# Patient Record
Sex: FEMALE | Race: BLACK OR AFRICAN AMERICAN | NOT HISPANIC OR LATINO
[De-identification: names, ages, dates, MRNs, and addresses within clinical notes are randomized per-mention and may not be internally consistent; named-entity substitution may affect disease eponyms.]

---

## 2017-01-04 DIAGNOSIS — Z84.81 FAMILY HISTORY OF CARRIER OF GENETIC DISEASE: ICD-10-CM

## 2017-01-26 ENCOUNTER — APPOINTMENT (OUTPATIENT)
Dept: OBGYN | Facility: CLINIC | Age: 37
End: 2017-01-26

## 2017-02-17 ENCOUNTER — APPOINTMENT (OUTPATIENT)
Dept: OBGYN | Facility: CLINIC | Age: 37
End: 2017-02-17

## 2017-02-17 ENCOUNTER — RESULT CHARGE (OUTPATIENT)
Age: 37
End: 2017-02-17

## 2017-02-17 VITALS
HEIGHT: 63 IN | SYSTOLIC BLOOD PRESSURE: 128 MMHG | HEART RATE: 88 BPM | DIASTOLIC BLOOD PRESSURE: 77 MMHG | WEIGHT: 157 LBS | BODY MASS INDEX: 27.82 KG/M2

## 2017-02-17 DIAGNOSIS — Z30.430 ENCOUNTER FOR INSERTION OF INTRAUTERINE CONTRACEPTIVE DEVICE: ICD-10-CM

## 2017-02-17 RX ORDER — IBUPROFEN 600 MG/1
600 TABLET, FILM COATED ORAL
Qty: 0 | Refills: 0 | Status: COMPLETED | OUTPATIENT
Start: 2017-02-17

## 2017-02-17 RX ADMIN — IBUPROFEN 1 MG: 600 TABLET ORAL at 00:00

## 2017-02-21 LAB
HCG UR QL: NEGATIVE
QUALITY CONTROL: YES

## 2017-04-04 ENCOUNTER — RX RENEWAL (OUTPATIENT)
Age: 37
End: 2017-04-04

## 2017-04-24 ENCOUNTER — APPOINTMENT (OUTPATIENT)
Dept: OBGYN | Facility: CLINIC | Age: 37
End: 2017-04-24

## 2017-04-26 ENCOUNTER — OTHER (OUTPATIENT)
Age: 37
End: 2017-04-26

## 2017-05-25 ENCOUNTER — RX RENEWAL (OUTPATIENT)
Age: 37
End: 2017-05-25

## 2018-02-10 ENCOUNTER — RX RENEWAL (OUTPATIENT)
Age: 38
End: 2018-02-10

## 2018-02-16 ENCOUNTER — APPOINTMENT (OUTPATIENT)
Dept: OBGYN | Facility: CLINIC | Age: 38
End: 2018-02-16
Payer: COMMERCIAL

## 2018-02-16 VITALS
SYSTOLIC BLOOD PRESSURE: 135 MMHG | BODY MASS INDEX: 28.27 KG/M2 | WEIGHT: 159.56 LBS | HEIGHT: 63 IN | DIASTOLIC BLOOD PRESSURE: 77 MMHG | HEART RATE: 90 BPM

## 2018-02-16 PROCEDURE — 99395 PREV VISIT EST AGE 18-39: CPT

## 2018-02-21 ENCOUNTER — APPOINTMENT (OUTPATIENT)
Dept: OBGYN | Facility: CLINIC | Age: 38
End: 2018-02-21

## 2018-02-27 LAB
C TRACH RRNA SPEC QL NAA+PROBE: NOT DETECTED
HBV SURFACE AG SER QL: NONREACTIVE
HCV AB SER QL: NONREACTIVE
HCV S/CO RATIO: 0.18 S/CO
HIV1+2 AB SPEC QL IA.RAPID: NONREACTIVE
N GONORRHOEA RRNA SPEC QL NAA+PROBE: NOT DETECTED
SOURCE AMPLIFICATION: NORMAL
T PALLIDUM AB SER QL IA: NEGATIVE

## 2018-05-09 ENCOUNTER — RX RENEWAL (OUTPATIENT)
Age: 38
End: 2018-05-09

## 2018-10-26 ENCOUNTER — LABORATORY RESULT (OUTPATIENT)
Age: 38
End: 2018-10-26

## 2018-10-26 ENCOUNTER — APPOINTMENT (OUTPATIENT)
Dept: OBGYN | Facility: CLINIC | Age: 38
End: 2018-10-26
Payer: COMMERCIAL

## 2018-10-26 VITALS
HEIGHT: 63 IN | WEIGHT: 165 LBS | DIASTOLIC BLOOD PRESSURE: 83 MMHG | BODY MASS INDEX: 29.23 KG/M2 | HEART RATE: 86 BPM | SYSTOLIC BLOOD PRESSURE: 124 MMHG

## 2018-10-26 DIAGNOSIS — B96.89 ACUTE VAGINITIS: ICD-10-CM

## 2018-10-26 DIAGNOSIS — Z01.419 ENCOUNTER FOR GYNECOLOGICAL EXAMINATION (GENERAL) (ROUTINE) W/OUT ABNORMAL FINDINGS: ICD-10-CM

## 2018-10-26 DIAGNOSIS — N76.0 ACUTE VAGINITIS: ICD-10-CM

## 2018-10-26 PROCEDURE — 99213 OFFICE O/P EST LOW 20 MIN: CPT

## 2018-10-27 ENCOUNTER — TRANSCRIPTION ENCOUNTER (OUTPATIENT)
Age: 38
End: 2018-10-27

## 2018-10-31 LAB
CANDIDA VAG CYTO: NOT DETECTED
G VAGINALIS+PREV SP MTYP VAG QL MICRO: DETECTED
HBV SURFACE AG SER QL: NONREACTIVE
HCV AB SER QL: NONREACTIVE
HCV S/CO RATIO: 0.17 S/CO
HIV1+2 AB SPEC QL IA.RAPID: NONREACTIVE
T PALLIDUM AB SER QL IA: NEGATIVE
T VAGINALIS VAG QL WET PREP: NOT DETECTED

## 2018-12-19 ENCOUNTER — EMERGENCY (EMERGENCY)
Facility: HOSPITAL | Age: 38
LOS: 1 days | Discharge: HOME | End: 2018-12-19
Attending: EMERGENCY MEDICINE | Admitting: EMERGENCY MEDICINE
Payer: COMMERCIAL

## 2018-12-19 VITALS
DIASTOLIC BLOOD PRESSURE: 66 MMHG | SYSTOLIC BLOOD PRESSURE: 118 MMHG | OXYGEN SATURATION: 100 % | RESPIRATION RATE: 16 BRPM | TEMPERATURE: 98 F | HEART RATE: 102 BPM

## 2018-12-19 DIAGNOSIS — Z87.59 PERSONAL HISTORY OF OTHER COMPLICATIONS OF PREGNANCY, CHILDBIRTH AND THE PUERPERIUM: Chronic | ICD-10-CM

## 2018-12-19 PROCEDURE — 99283 EMERGENCY DEPT VISIT LOW MDM: CPT

## 2018-12-19 RX ORDER — ACETAMINOPHEN 500 MG
975 TABLET ORAL ONCE
Qty: 0 | Refills: 0 | Status: COMPLETED | OUTPATIENT
Start: 2018-12-19 | End: 2018-12-19

## 2018-12-19 RX ORDER — DIAZEPAM 5 MG
1 TABLET ORAL
Qty: 9 | Refills: 0
Start: 2018-12-19 | End: 2018-12-21

## 2018-12-19 RX ORDER — IBUPROFEN 200 MG
600 TABLET ORAL ONCE
Qty: 0 | Refills: 0 | Status: COMPLETED | OUTPATIENT
Start: 2018-12-19 | End: 2018-12-19

## 2018-12-19 RX ADMIN — Medication 975 MILLIGRAM(S): at 13:57

## 2018-12-19 RX ADMIN — Medication 600 MILLIGRAM(S): at 13:57

## 2018-12-19 NOTE — ED PROVIDER NOTE - FAMILY HISTORY
Mother  Still living? Unknown  Family history of hereditary spherocytosis, Age at diagnosis: Age Unknown     Father  Still living? Unknown  Family history of hypertension, Age at diagnosis: Age Unknown
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
## 2018-12-19 NOTE — ED PROVIDER NOTE - NSFOLLOWUPINSTRUCTIONS_ED_ALL_ED_FT
1. take motrin 600 mg every 6 hours for pain  2. take tylenol 975mg every 6 hours for pain  3. use cold packs for 20 minutes at a time to the affected areas for the next 3 days  4. switch to hot packs after the 3 days.  5. you will feel worse over the next 2 days. symptoms should start to improve saturday  6. activity as tolerated.
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
## 2018-12-19 NOTE — ED PROVIDER NOTE - PHYSICAL EXAMINATION
Rhona Hughes M.D.:   patient awake alert seen sitting up in stretcher NAD .   LUNGS CTAB no wheeze no crackle.   CARD RRR no m/r/g.    Abdomen soft NT ND no rebound no guarding no CVA tenderness.   EXT WWP no edema no calf tenderness CV 2+DP/PT bilaterally. no midline c/t/l spine tenderness. diffuse b/l paraspinal tenderness in c/t/l distributions.  neuro A&Ox3 cn 2-12 intact gross motor and sensory intact in all extremities gait normal.    skin warm and dry no rash  HEENT: moist mucous membranes, PERRL, EOMI

## 2018-12-19 NOTE — ED PROVIDER NOTE - PMH
Hereditary spherocytosis    IUP (intrauterine pregnancy), incidental  16 weeks  Mucous polyp of cervix
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
## 2019-07-25 ENCOUNTER — INPATIENT (INPATIENT)
Facility: HOSPITAL | Age: 39
LOS: 7 days | Discharge: HOME | End: 2019-08-02
Attending: INTERNAL MEDICINE | Admitting: INTERNAL MEDICINE
Payer: COMMERCIAL

## 2019-07-25 VITALS
OXYGEN SATURATION: 100 % | SYSTOLIC BLOOD PRESSURE: 123 MMHG | HEART RATE: 130 BPM | TEMPERATURE: 100 F | DIASTOLIC BLOOD PRESSURE: 75 MMHG | RESPIRATION RATE: 18 BRPM

## 2019-07-25 DIAGNOSIS — Z87.59 PERSONAL HISTORY OF OTHER COMPLICATIONS OF PREGNANCY, CHILDBIRTH AND THE PUERPERIUM: Chronic | ICD-10-CM

## 2019-07-25 LAB
ALBUMIN SERPL ELPH-MCNC: 4.8 G/DL — SIGNIFICANT CHANGE UP (ref 3.3–5)
ALP SERPL-CCNC: 77 U/L — SIGNIFICANT CHANGE UP (ref 40–120)
ALT FLD-CCNC: 122 U/L — HIGH (ref 4–33)
ANION GAP SERPL CALC-SCNC: 14 MMO/L — SIGNIFICANT CHANGE UP (ref 7–14)
ANISOCYTOSIS BLD QL: SLIGHT — SIGNIFICANT CHANGE UP
AST SERPL-CCNC: 82 U/L — HIGH (ref 4–32)
BASE EXCESS BLDV CALC-SCNC: 3.3 MMOL/L — SIGNIFICANT CHANGE UP
BASOPHILS # BLD AUTO: 0.02 K/UL — SIGNIFICANT CHANGE UP (ref 0–0.2)
BASOPHILS NFR BLD AUTO: 0.1 % — SIGNIFICANT CHANGE UP (ref 0–2)
BILIRUB SERPL-MCNC: 39.1 MG/DL — HIGH (ref 0.2–1.2)
BLOOD GAS VENOUS - CREATININE: 0.84 MG/DL — SIGNIFICANT CHANGE UP (ref 0.5–1.3)
BLOOD GAS VENOUS - FIO2: 21 — SIGNIFICANT CHANGE UP
BUN SERPL-MCNC: 17 MG/DL — SIGNIFICANT CHANGE UP (ref 7–23)
CALCIUM SERPL-MCNC: 9.8 MG/DL — SIGNIFICANT CHANGE UP (ref 8.4–10.5)
CHLORIDE BLDV-SCNC: 105 MMOL/L — SIGNIFICANT CHANGE UP (ref 96–108)
CHLORIDE SERPL-SCNC: 108 MMOL/L — HIGH (ref 98–107)
CO2 SERPL-SCNC: 23 MMOL/L — SIGNIFICANT CHANGE UP (ref 22–31)
CREAT SERPL-MCNC: 0.6 MG/DL — SIGNIFICANT CHANGE UP (ref 0.5–1.3)
EOSINOPHIL # BLD AUTO: 0.01 K/UL — SIGNIFICANT CHANGE UP (ref 0–0.5)
EOSINOPHIL NFR BLD AUTO: 0 % — SIGNIFICANT CHANGE UP (ref 0–6)
GAS PNL BLDV: 145 MMOL/L — SIGNIFICANT CHANGE UP (ref 136–146)
GLUCOSE BLDV-MCNC: 103 MG/DL — HIGH (ref 70–99)
GLUCOSE SERPL-MCNC: 112 MG/DL — HIGH (ref 70–99)
HCO3 BLDV-SCNC: 26 MMOL/L — SIGNIFICANT CHANGE UP (ref 20–27)
HCT VFR BLD CALC: 28.6 % — LOW (ref 34.5–45)
HCT VFR BLDV CALC: 29.9 % — LOW (ref 34.5–45)
HGB BLD-MCNC: 9.4 G/DL — LOW (ref 11.5–15.5)
HGB BLDV-MCNC: 9.7 G/DL — LOW (ref 11.5–15.5)
IMM GRANULOCYTES NFR BLD AUTO: 0.7 % — SIGNIFICANT CHANGE UP (ref 0–1.5)
LACTATE BLDV-MCNC: 1.4 MMOL/L — SIGNIFICANT CHANGE UP (ref 0.5–2)
LYMPHOCYTES # BLD AUTO: 0.32 K/UL — LOW (ref 1–3.3)
LYMPHOCYTES # BLD AUTO: 1.5 % — LOW (ref 13–44)
MACROCYTES BLD QL: SLIGHT — SIGNIFICANT CHANGE UP
MANUAL SMEAR VERIFICATION: SIGNIFICANT CHANGE UP
MCHC RBC-ENTMCNC: 30 PG — SIGNIFICANT CHANGE UP (ref 27–34)
MCHC RBC-ENTMCNC: 32.9 % — SIGNIFICANT CHANGE UP (ref 32–36)
MCV RBC AUTO: 91.4 FL — SIGNIFICANT CHANGE UP (ref 80–100)
MONOCYTES # BLD AUTO: 1.53 K/UL — HIGH (ref 0–0.9)
MONOCYTES NFR BLD AUTO: 7.2 % — SIGNIFICANT CHANGE UP (ref 2–14)
NEUTROPHILS # BLD AUTO: 19.36 K/UL — HIGH (ref 1.8–7.4)
NEUTROPHILS NFR BLD AUTO: 90.5 % — HIGH (ref 43–77)
NRBC # FLD: 0.06 K/UL — SIGNIFICANT CHANGE UP (ref 0–0)
PCO2 BLDV: 47 MMHG — SIGNIFICANT CHANGE UP (ref 41–51)
PH BLDV: 7.39 PH — SIGNIFICANT CHANGE UP (ref 7.32–7.43)
PLATELET # BLD AUTO: 252 K/UL — SIGNIFICANT CHANGE UP (ref 150–400)
PLATELET COUNT - ESTIMATE: NORMAL — SIGNIFICANT CHANGE UP
PMV BLD: 10.5 FL — SIGNIFICANT CHANGE UP (ref 7–13)
PO2 BLDV: < 24 MMHG — LOW (ref 35–40)
POLYCHROMASIA BLD QL SMEAR: SLIGHT — SIGNIFICANT CHANGE UP
POTASSIUM BLDV-SCNC: 3.7 MMOL/L — SIGNIFICANT CHANGE UP (ref 3.4–4.5)
POTASSIUM SERPL-MCNC: 3.6 MMOL/L — SIGNIFICANT CHANGE UP (ref 3.5–5.3)
POTASSIUM SERPL-SCNC: 3.6 MMOL/L — SIGNIFICANT CHANGE UP (ref 3.5–5.3)
PROT SERPL-MCNC: 6.6 G/DL — SIGNIFICANT CHANGE UP (ref 6–8.3)
RBC # BLD: 3.13 M/UL — LOW (ref 3.8–5.2)
RBC # FLD: 25.1 % — HIGH (ref 10.3–14.5)
SAO2 % BLDV: 27.6 % — LOW (ref 60–85)
SODIUM SERPL-SCNC: 145 MMOL/L — SIGNIFICANT CHANGE UP (ref 135–145)
WBC # BLD: 21.39 K/UL — HIGH (ref 3.8–10.5)
WBC # FLD AUTO: 21.39 K/UL — HIGH (ref 3.8–10.5)

## 2019-07-25 PROCEDURE — 71046 X-RAY EXAM CHEST 2 VIEWS: CPT | Mod: 26

## 2019-07-25 RX ORDER — SODIUM CHLORIDE 9 MG/ML
1000 INJECTION INTRAMUSCULAR; INTRAVENOUS; SUBCUTANEOUS ONCE
Refills: 0 | Status: COMPLETED | OUTPATIENT
Start: 2019-07-25 | End: 2019-07-25

## 2019-07-25 RX ORDER — IBUPROFEN 200 MG
600 TABLET ORAL ONCE
Refills: 0 | Status: COMPLETED | OUTPATIENT
Start: 2019-07-25 | End: 2019-07-25

## 2019-07-25 RX ORDER — ACETAMINOPHEN 500 MG
975 TABLET ORAL ONCE
Refills: 0 | Status: DISCONTINUED | OUTPATIENT
Start: 2019-07-25 | End: 2019-07-25

## 2019-07-25 RX ORDER — KETOROLAC TROMETHAMINE 30 MG/ML
15 SYRINGE (ML) INJECTION ONCE
Refills: 0 | Status: DISCONTINUED | OUTPATIENT
Start: 2019-07-25 | End: 2019-07-25

## 2019-07-25 RX ORDER — ONDANSETRON 8 MG/1
4 TABLET, FILM COATED ORAL ONCE
Refills: 0 | Status: COMPLETED | OUTPATIENT
Start: 2019-07-25 | End: 2019-07-25

## 2019-07-25 RX ADMIN — ONDANSETRON 4 MILLIGRAM(S): 8 TABLET, FILM COATED ORAL at 22:16

## 2019-07-25 RX ADMIN — Medication 600 MILLIGRAM(S): at 22:15

## 2019-07-25 RX ADMIN — Medication 15 MILLIGRAM(S): at 22:30

## 2019-07-25 RX ADMIN — SODIUM CHLORIDE 1000 MILLILITER(S): 9 INJECTION INTRAMUSCULAR; INTRAVENOUS; SUBCUTANEOUS at 23:54

## 2019-07-25 RX ADMIN — SODIUM CHLORIDE 1000 MILLILITER(S): 9 INJECTION INTRAMUSCULAR; INTRAVENOUS; SUBCUTANEOUS at 22:16

## 2019-07-25 NOTE — ED PROVIDER NOTE - ATTENDING CONTRIBUTION TO CARE
37yo F with pmhx heriditary spherocytosis, no prior abdominal surgeries, has IUD, p/w 2 days generalized abd cramping, mostly lower abd, n/v, and dark reddish urine but no dysuria. Noted to have borderline fever here of 100.2F. no cough or URI sx. LMP 1 month ago about per patient    General: Patient in no apparent distress, AAO x 3  Skin: Dry and intact  HEENT: Oral mucosa moist. No pharyngeal exudates or tonsillar enlargement  Eyes: Conjunctiva normal. ++scleral icterus  Cardiac: Regular rate and rhythm  Respiratory: Lungs clear b/l and symmetric. No respiratory distress  Gastrointestinal: Abdomen soft, nondistended, nontender. + hepatomegaly  Musculoskeletal: Moves all extremities spontaneously  Neurological: alert and oriented to personal, place and time  Psychiatric: Cooperative    a/p  ?uti vs bilirubin in urine.   unclear etiology of borderline temp but abd clinically nontender   labs, ua, pain meds  serum bhcg as urine tests not working ? 2/2 blood or bili

## 2019-07-25 NOTE — ED ADULT TRIAGE NOTE - CHIEF COMPLAINT QUOTE
Pt with n/v and abdominal cramping since yesterday 10am. pt also reports  hematuria at 3pm today. Pt with fever and tachycardic  in triage. Pts with HX spherocytosis, eye are jaudice.

## 2019-07-25 NOTE — ED PROVIDER NOTE - PROGRESS NOTE DETAILS
CIANO DO- labs showin transaminitis and tbili higher than baseline. alos showing leukocytosis. pending ua. will get antibx

## 2019-07-25 NOTE — ED PROVIDER NOTE - PHYSICAL EXAMINATION
Rajan BRYSON MD PGY2:   PHYSICAL EXAM:    GENERAL: NAD, well-developed. Lying on bed.   HEENT:  Atraumatic, Normocephalic  CHEST/LUNG: Chest rise equal bilaterally. CTAB.   HEART: Regular rate and rhythm. No murmurs or rubs.   ABDOMEN: Soft, Nontender, Nondistended with knees flexed. No RUQ TTP. No suprapubic TTP.   EXTREMITIES:  2+ Peripheral Pulses.  PSYCH: A&Ox3.   SKIN: No obvious rashes or lesions

## 2019-07-25 NOTE — ED PROVIDER NOTE - CLINICAL SUMMARY MEDICAL DECISION MAKING FREE TEXT BOX
Rajan BRYSON MD PGY2: Patient here with fevers concerning for urinary source. WIll obtain UA and urine culture to evaluate. Given lack of TTP will defer imaging for intrabdominal pathology. Will obtain CMP to assess for hepatic function given chronic jaundice due to hereditary spherocytosis. Will give IVF, zofran, ibuprofen for borderline temp and PO challenge.

## 2019-07-25 NOTE — ED PROVIDER NOTE - OBJECTIVE STATEMENT
Rajan BRYSON MD PGY2: 38 F PMH hereditary spherocytosis here with 2 days of abdominal cramping, inability to tolerate PO and hematuria. No burning with urination, however notices LLQ pain with voiding. Has been having subjective fevers at home. No diarrhea. No recent sick contacts. No cough or difficulty breathing.

## 2019-07-25 NOTE — ED ADULT NURSE NOTE - OBJECTIVE STATEMENT
pt received to room 19 a/o x 3 c/o nausea and multiple episodes of NBNB vomiting, and lower abd cramping since yesterday afternoon and hematouria that started today. pt states she hasn't been able to keep anything down since yesterday. Pt has hereditary spherocytosis and noted to have jaundiced eyes. Respirations even and unlabored. Lung sounds clear with equal chest rise bilaterally. ABD is soft,  tender to lower abd, distended with normal active bowel sounds. pt denies any frequency or dysurisa. 22g iv placed to top or left hand. labs drawn and sent. medication given as per order.

## 2019-07-26 DIAGNOSIS — R74.0 NONSPECIFIC ELEVATION OF LEVELS OF TRANSAMINASE AND LACTIC ACID DEHYDROGENASE [LDH]: ICD-10-CM

## 2019-07-26 DIAGNOSIS — A41.9 SEPSIS, UNSPECIFIED ORGANISM: ICD-10-CM

## 2019-07-26 DIAGNOSIS — K85.80 OTHER ACUTE PANCREATITIS WITHOUT NECROSIS OR INFECTION: ICD-10-CM

## 2019-07-26 DIAGNOSIS — Z29.9 ENCOUNTER FOR PROPHYLACTIC MEASURES, UNSPECIFIED: ICD-10-CM

## 2019-07-26 DIAGNOSIS — K85.90 ACUTE PANCREATITIS WITHOUT NECROSIS OR INFECTION, UNSPECIFIED: ICD-10-CM

## 2019-07-26 DIAGNOSIS — E80.6 OTHER DISORDERS OF BILIRUBIN METABOLISM: ICD-10-CM

## 2019-07-26 DIAGNOSIS — D64.9 ANEMIA, UNSPECIFIED: ICD-10-CM

## 2019-07-26 LAB
ALBUMIN SERPL ELPH-MCNC: 4 G/DL — SIGNIFICANT CHANGE UP (ref 3.3–5)
ALBUMIN SERPL ELPH-MCNC: 4 G/DL — SIGNIFICANT CHANGE UP (ref 3.3–5)
ALP SERPL-CCNC: 67 U/L — SIGNIFICANT CHANGE UP (ref 40–120)
ALP SERPL-CCNC: 67 U/L — SIGNIFICANT CHANGE UP (ref 40–120)
ALT FLD-CCNC: 93 U/L — HIGH (ref 4–33)
ALT FLD-CCNC: 93 U/L — HIGH (ref 4–33)
ANION GAP SERPL CALC-SCNC: 11 MMO/L — SIGNIFICANT CHANGE UP (ref 7–14)
APPEARANCE UR: SIGNIFICANT CHANGE UP
APTT BLD: 26.9 SEC — LOW (ref 27.5–36.3)
AST SERPL-CCNC: 47 U/L — HIGH (ref 4–32)
AST SERPL-CCNC: 47 U/L — HIGH (ref 4–32)
BACTERIA # UR AUTO: HIGH
BILIRUB DIRECT SERPL-MCNC: 13.3 MG/DL — HIGH (ref 0.1–0.2)
BILIRUB DIRECT SERPL-MCNC: 13.4 MG/DL — HIGH (ref 0.1–0.2)
BILIRUB SERPL-MCNC: 34.1 MG/DL — HIGH (ref 0.2–1.2)
BILIRUB SERPL-MCNC: 34.1 MG/DL — HIGH (ref 0.2–1.2)
BILIRUB UR-MCNC: HIGH
BLD GP AB SCN SERPL QL: NEGATIVE — SIGNIFICANT CHANGE UP
BLOOD UR QL VISUAL: NEGATIVE — SIGNIFICANT CHANGE UP
BUN SERPL-MCNC: 18 MG/DL — SIGNIFICANT CHANGE UP (ref 7–23)
CALCIUM SERPL-MCNC: 8.7 MG/DL — SIGNIFICANT CHANGE UP (ref 8.4–10.5)
CHLORIDE SERPL-SCNC: 111 MMOL/L — HIGH (ref 98–107)
CO2 SERPL-SCNC: 24 MMOL/L — SIGNIFICANT CHANGE UP (ref 22–31)
COLOR SPEC: SIGNIFICANT CHANGE UP
CREAT SERPL-MCNC: 0.54 MG/DL — SIGNIFICANT CHANGE UP (ref 0.5–1.3)
EPI CELLS # UR: SIGNIFICANT CHANGE UP
FOLATE SERPL-MCNC: 13.9 NG/ML — SIGNIFICANT CHANGE UP (ref 4.7–20)
GLUCOSE SERPL-MCNC: 96 MG/DL — SIGNIFICANT CHANGE UP (ref 70–99)
GLUCOSE UR-MCNC: NEGATIVE — SIGNIFICANT CHANGE UP
HAPTOGLOB SERPL-MCNC: < 20 MG/DL — LOW (ref 34–200)
HAV IGM SER-ACNC: NONREACTIVE — SIGNIFICANT CHANGE UP
HBV CORE IGM SER-ACNC: NONREACTIVE — SIGNIFICANT CHANGE UP
HBV SURFACE AG SER-ACNC: NONREACTIVE — SIGNIFICANT CHANGE UP
HCG SERPL-ACNC: < 5 MIU/ML — SIGNIFICANT CHANGE UP
HCT VFR BLD CALC: 20.7 % — CRITICAL LOW (ref 34.5–45)
HCT VFR BLD CALC: 21.6 % — LOW (ref 34.5–45)
HCV AB S/CO SERPL IA: 0.11 S/CO — SIGNIFICANT CHANGE UP (ref 0–0.99)
HCV AB SERPL-IMP: SIGNIFICANT CHANGE UP
HGB BLD-MCNC: 6.5 G/DL — CRITICAL LOW (ref 11.5–15.5)
HGB BLD-MCNC: 6.9 G/DL — CRITICAL LOW (ref 11.5–15.5)
INR BLD: 1.45 — HIGH (ref 0.88–1.17)
KETONES UR-MCNC: SIGNIFICANT CHANGE UP
LACTATE SERPL-SCNC: 0.7 MMOL/L — SIGNIFICANT CHANGE UP (ref 0.5–2)
LDH SERPL L TO P-CCNC: 287 U/L — HIGH (ref 135–225)
LEUKOCYTE ESTERASE UR-ACNC: NEGATIVE — SIGNIFICANT CHANGE UP
LIDOCAIN IGE QN: > 600 U/L — HIGH (ref 7–60)
MAGNESIUM SERPL-MCNC: 2 MG/DL — SIGNIFICANT CHANGE UP (ref 1.6–2.6)
MCHC RBC-ENTMCNC: 29.3 PG — SIGNIFICANT CHANGE UP (ref 27–34)
MCHC RBC-ENTMCNC: 29.5 PG — SIGNIFICANT CHANGE UP (ref 27–34)
MCHC RBC-ENTMCNC: 31.4 % — LOW (ref 32–36)
MCHC RBC-ENTMCNC: 31.9 % — LOW (ref 32–36)
MCV RBC AUTO: 92.3 FL — SIGNIFICANT CHANGE UP (ref 80–100)
MCV RBC AUTO: 93.2 FL — SIGNIFICANT CHANGE UP (ref 80–100)
NITRITE UR-MCNC: NEGATIVE — SIGNIFICANT CHANGE UP
NRBC # FLD: 0.02 K/UL — SIGNIFICANT CHANGE UP (ref 0–0)
NRBC # FLD: 0.05 K/UL — SIGNIFICANT CHANGE UP (ref 0–0)
PH UR: 6 — SIGNIFICANT CHANGE UP (ref 5–8)
PHOSPHATE SERPL-MCNC: 3.2 MG/DL — SIGNIFICANT CHANGE UP (ref 2.5–4.5)
PLATELET # BLD AUTO: 159 K/UL — SIGNIFICANT CHANGE UP (ref 150–400)
PLATELET # BLD AUTO: 164 K/UL — SIGNIFICANT CHANGE UP (ref 150–400)
PMV BLD: 10.4 FL — SIGNIFICANT CHANGE UP (ref 7–13)
PMV BLD: 9.7 FL — SIGNIFICANT CHANGE UP (ref 7–13)
POTASSIUM SERPL-MCNC: 3.1 MMOL/L — LOW (ref 3.5–5.3)
POTASSIUM SERPL-SCNC: 3.1 MMOL/L — LOW (ref 3.5–5.3)
PROT SERPL-MCNC: 5.8 G/DL — LOW (ref 6–8.3)
PROT SERPL-MCNC: 5.8 G/DL — LOW (ref 6–8.3)
PROT UR-MCNC: SIGNIFICANT CHANGE UP
PROTHROM AB SERPL-ACNC: 16.3 SEC — HIGH (ref 9.8–13.1)
RBC # BLD: 2.22 M/UL — LOW (ref 3.8–5.2)
RBC # BLD: 2.34 M/UL — LOW (ref 3.8–5.2)
RBC # FLD: 25.1 % — HIGH (ref 10.3–14.5)
RBC # FLD: 26.2 % — HIGH (ref 10.3–14.5)
RBC CASTS # UR COMP ASSIST: SIGNIFICANT CHANGE UP (ref 0–?)
RETICS #: 348 K/UL — HIGH (ref 25–125)
RETICS/RBC NFR: 14.9 % — HIGH (ref 0.5–2.5)
RH IG SCN BLD-IMP: POSITIVE — SIGNIFICANT CHANGE UP
SODIUM SERPL-SCNC: 146 MMOL/L — HIGH (ref 135–145)
SP GR SPEC: 1.03 — SIGNIFICANT CHANGE UP (ref 1–1.04)
TRIGL SERPL-MCNC: 104 MG/DL — SIGNIFICANT CHANGE UP (ref 10–149)
UROBILINOGEN FLD QL: HIGH
WBC # BLD: 11.86 K/UL — HIGH (ref 3.8–10.5)
WBC # BLD: 13.71 K/UL — HIGH (ref 3.8–10.5)
WBC # FLD AUTO: 11.86 K/UL — HIGH (ref 3.8–10.5)
WBC # FLD AUTO: 13.71 K/UL — HIGH (ref 3.8–10.5)
WBC UR QL: HIGH (ref 0–?)

## 2019-07-26 PROCEDURE — 12345: CPT | Mod: NC

## 2019-07-26 PROCEDURE — 76705 ECHO EXAM OF ABDOMEN: CPT | Mod: 26

## 2019-07-26 PROCEDURE — 74182 MRI ABDOMEN W/CONTRAST: CPT | Mod: 26

## 2019-07-26 PROCEDURE — 99223 1ST HOSP IP/OBS HIGH 75: CPT | Mod: GC

## 2019-07-26 RX ORDER — CEFEPIME 1 G/1
1000 INJECTION, POWDER, FOR SOLUTION INTRAMUSCULAR; INTRAVENOUS ONCE
Refills: 0 | Status: COMPLETED | OUTPATIENT
Start: 2019-07-26 | End: 2019-07-26

## 2019-07-26 RX ORDER — HYDROMORPHONE HYDROCHLORIDE 2 MG/ML
0.5 INJECTION INTRAMUSCULAR; INTRAVENOUS; SUBCUTANEOUS EVERY 4 HOURS
Refills: 0 | Status: DISCONTINUED | OUTPATIENT
Start: 2019-07-26 | End: 2019-08-02

## 2019-07-26 RX ORDER — HYDROMORPHONE HYDROCHLORIDE 2 MG/ML
0.5 INJECTION INTRAMUSCULAR; INTRAVENOUS; SUBCUTANEOUS EVERY 6 HOURS
Refills: 0 | Status: DISCONTINUED | OUTPATIENT
Start: 2019-07-26 | End: 2019-07-26

## 2019-07-26 RX ORDER — SODIUM CHLORIDE 9 MG/ML
1000 INJECTION, SOLUTION INTRAVENOUS
Refills: 0 | Status: DISCONTINUED | OUTPATIENT
Start: 2019-07-26 | End: 2019-07-27

## 2019-07-26 RX ORDER — ONDANSETRON 8 MG/1
4 TABLET, FILM COATED ORAL ONCE
Refills: 0 | Status: COMPLETED | OUTPATIENT
Start: 2019-07-26 | End: 2019-07-26

## 2019-07-26 RX ORDER — METRONIDAZOLE 500 MG
500 TABLET ORAL EVERY 8 HOURS
Refills: 0 | Status: DISCONTINUED | OUTPATIENT
Start: 2019-07-26 | End: 2019-07-26

## 2019-07-26 RX ORDER — POTASSIUM CHLORIDE 20 MEQ
10 PACKET (EA) ORAL ONCE
Refills: 0 | Status: COMPLETED | OUTPATIENT
Start: 2019-07-26 | End: 2019-07-26

## 2019-07-26 RX ORDER — ACETAMINOPHEN 500 MG
1000 TABLET ORAL EVERY 8 HOURS
Refills: 0 | Status: COMPLETED | OUTPATIENT
Start: 2019-07-26 | End: 2019-07-26

## 2019-07-26 RX ORDER — SODIUM CHLORIDE 9 MG/ML
1000 INJECTION, SOLUTION INTRAVENOUS
Refills: 0 | Status: DISCONTINUED | OUTPATIENT
Start: 2019-07-26 | End: 2019-07-26

## 2019-07-26 RX ORDER — CEFEPIME 1 G/1
2000 INJECTION, POWDER, FOR SOLUTION INTRAMUSCULAR; INTRAVENOUS EVERY 8 HOURS
Refills: 0 | Status: DISCONTINUED | OUTPATIENT
Start: 2019-07-26 | End: 2019-07-26

## 2019-07-26 RX ORDER — KETOROLAC TROMETHAMINE 30 MG/ML
15 SYRINGE (ML) INJECTION ONCE
Refills: 0 | Status: DISCONTINUED | OUTPATIENT
Start: 2019-07-26 | End: 2019-07-26

## 2019-07-26 RX ORDER — FOLIC ACID 0.8 MG
1 TABLET ORAL DAILY
Refills: 0 | Status: DISCONTINUED | OUTPATIENT
Start: 2019-07-26 | End: 2019-08-02

## 2019-07-26 RX ORDER — POTASSIUM CHLORIDE 20 MEQ
20 PACKET (EA) ORAL ONCE
Refills: 0 | Status: DISCONTINUED | OUTPATIENT
Start: 2019-07-26 | End: 2019-07-26

## 2019-07-26 RX ORDER — PIPERACILLIN AND TAZOBACTAM 4; .5 G/20ML; G/20ML
3.38 INJECTION, POWDER, LYOPHILIZED, FOR SOLUTION INTRAVENOUS EVERY 8 HOURS
Refills: 0 | Status: COMPLETED | OUTPATIENT
Start: 2019-07-26 | End: 2019-08-02

## 2019-07-26 RX ADMIN — PIPERACILLIN AND TAZOBACTAM 25 GRAM(S): 4; .5 INJECTION, POWDER, LYOPHILIZED, FOR SOLUTION INTRAVENOUS at 22:01

## 2019-07-26 RX ADMIN — Medication 15 MILLIGRAM(S): at 01:55

## 2019-07-26 RX ADMIN — Medication 1 MILLIGRAM(S): at 12:28

## 2019-07-26 RX ADMIN — Medication 100 MILLIEQUIVALENT(S): at 15:08

## 2019-07-26 RX ADMIN — CEFEPIME 100 MILLIGRAM(S): 1 INJECTION, POWDER, FOR SOLUTION INTRAMUSCULAR; INTRAVENOUS at 01:16

## 2019-07-26 RX ADMIN — SODIUM CHLORIDE 100 MILLILITER(S): 9 INJECTION, SOLUTION INTRAVENOUS at 07:51

## 2019-07-26 RX ADMIN — Medication 100 MILLIGRAM(S): at 07:51

## 2019-07-26 RX ADMIN — HYDROMORPHONE HYDROCHLORIDE 0.5 MILLIGRAM(S): 2 INJECTION INTRAMUSCULAR; INTRAVENOUS; SUBCUTANEOUS at 14:40

## 2019-07-26 RX ADMIN — HYDROMORPHONE HYDROCHLORIDE 0.5 MILLIGRAM(S): 2 INJECTION INTRAMUSCULAR; INTRAVENOUS; SUBCUTANEOUS at 08:26

## 2019-07-26 RX ADMIN — HYDROMORPHONE HYDROCHLORIDE 0.5 MILLIGRAM(S): 2 INJECTION INTRAMUSCULAR; INTRAVENOUS; SUBCUTANEOUS at 14:25

## 2019-07-26 RX ADMIN — SODIUM CHLORIDE 100 MILLILITER(S): 9 INJECTION, SOLUTION INTRAVENOUS at 18:42

## 2019-07-26 RX ADMIN — PIPERACILLIN AND TAZOBACTAM 25 GRAM(S): 4; .5 INJECTION, POWDER, LYOPHILIZED, FOR SOLUTION INTRAVENOUS at 15:08

## 2019-07-26 RX ADMIN — ONDANSETRON 4 MILLIGRAM(S): 8 TABLET, FILM COATED ORAL at 15:16

## 2019-07-26 RX ADMIN — SODIUM CHLORIDE 100 MILLILITER(S): 9 INJECTION, SOLUTION INTRAVENOUS at 22:01

## 2019-07-26 RX ADMIN — Medication 400 MILLIGRAM(S): at 18:45

## 2019-07-26 RX ADMIN — HYDROMORPHONE HYDROCHLORIDE 0.5 MILLIGRAM(S): 2 INJECTION INTRAMUSCULAR; INTRAVENOUS; SUBCUTANEOUS at 08:11

## 2019-07-26 NOTE — PROVIDER CONTACT NOTE (CRITICAL VALUE NOTIFICATION) - BACKGROUND
Pt admitted with vlood in urine, abdominal cramping and difficulty urinating. Pt has hereditary spherocytosis

## 2019-07-26 NOTE — H&P ADULT - NSHPSOCIALHISTORY_GEN_ALL_CORE
Socially drinks , but reports low drinking tolerance. Denies smoking ( only in high school) has quit over 10 years ago. Denies illicit drug use. Smokes marijuana at times.

## 2019-07-26 NOTE — PROGRESS NOTE ADULT - SUBJECTIVE AND OBJECTIVE BOX
Patient is a 38y old  Female who presents with a chief complaint of Hyperbilirubinemia (2019 04:44)      SUBJECTIVE / OVERNIGHT EVENTS:  Patient seen and examined at bedside.    Other Review of Systems Negative.    MEDICATIONS  (STANDING):  folic acid 1 milliGRAM(s) Oral daily  lactated ringers. 1000 milliLiter(s) (100 mL/Hr) IV Continuous <Continuous>  piperacillin/tazobactam IVPB.. 3.375 Gram(s) IV Intermittent every 8 hours    MEDICATIONS  (PRN):  HYDROmorphone  Injectable 0.5 milliGRAM(s) IV Push every 6 hours PRN Severe Pain (7 - 10)      OBJECTIVE:    Vital Signs Last 24 Hrs  T(C): 36.9 (2019 14:12), Max: 37.9 (2019 19:50)  T(F): 98.5 (2019 14:12), Max: 100.2 (2019 19:50)  HR: 107 (2019 14:12) (95 - 130)  BP: 114/67 (2019 14:12) (104/60 - 123/75)  BP(mean): --  RR: 20 (2019 14:12) (16 - 20)  SpO2: 100% (2019 14:12) (98% - 100%)    CAPILLARY BLOOD GLUCOSE        I&O's Summary    2019 07:01  -  2019 18:03  --------------------------------------------------------  IN: 0 mL / OUT: 600 mL / NET: -600 mL        PHYSICAL EXAM:  	GENERAL: NAD, well-developed  	EYES: EOMI, PERRL, jaundice sclera, sublingual jaundice   	NECK: Supple, No JVD  	CHEST/LUNG: Clear to auscultation bilaterally; No wheeze  	HEART: Regular rate and rhythm; No murmurs, rubs, or gallops  	ABDOMEN: Soft, tender abdomen throughout, +rebound, +guarding, + splenomegaly   	EXTREMITIES:, No clubbing, cyanosis, or edema  	PSYCH: AAOx3, nl affect, nl behavior  	NEUROLOGY: non-focal    SKIN: No rashes or lesions    LABS:                        6.9    13.71 )-----------( 159      ( 2019 11:50 )             21.6     Auto Eosinophil # x     / Auto Eosinophil % x     / Auto Neutrophil # x     / Auto Neutrophil % x     / BANDS % x                            6.5    11.86 )-----------( 164      ( 2019 06:00 )             20.7     Auto Eosinophil # x     / Auto Eosinophil % x     / Auto Neutrophil # x     / Auto Neutrophil % x     / BANDS % x                            9.4    21.39 )-----------( 252      ( 2019 21:00 )             28.6     Auto Eosinophil # 0.01  / Auto Eosinophil % 0.0   / Auto Neutrophil # 19.36 / Auto Neutrophil % 90.5  / BANDS % x        07-26    146<H>  |  111<H>  |  18  ----------------------------<  96  3.1<L>   |  24  |  0.54      145  |  108<H>  |  17  ----------------------------<  112<H>  3.6   |  23  |  0.60    Ca    8.7      2019 06:00  Mg     2.0       Phos  3.2       TPro  5.8<L>  /  Alb  4.0  /  TBili  34.1<H>  /  DBili  13.4<H>  /  AST  47<H>  /  ALT  93<H>  /  AlkPhos  67    TPro  6.6  /  Alb  4.8  /  TBili  39.1<H>  /  DBili  13.3<H>  /  AST  82<H>  /  ALT  122<H>  /  AlkPhos  77  07-25    PT/INR - ( 2019 11:50 )   PT: 16.3 SEC;   INR: 1.45          PTT - ( 2019 11:50 )  PTT:26.9 SEC      Urinalysis Basic - ( 2019 22:52 )    Color: ORANGE / Appearance: HAZY / S.030 / pH: 6.0  Gluc: NEGATIVE / Ketone: TRACE  / Bili: LARGE / Urobili: MODERATE   Blood: NEGATIVE / Protein: SMALL / Nitrite: NEGATIVE   Leuk Esterase: NEGATIVE / RBC: 0-2 / WBC 6-10   Sq Epi: x / Non Sq Epi: MODERATE / Bacteria: MODERATE      Lactate, Blood: 0.7 mmol/L ( @ 11:50)        ABG:     VBG: ( 21:00 ) - VBG - pH: 7.39  | pCO2: 47    | pO2: < 24  | Lactate: 1.4          Care Discussed with Consultants/Other Providers:    RADIOLOGY & ADDITIONAL TESTS:  (Imaging Personally Reviewed)    < from: US Abdomen Limited (19 @ 11:08) >  IMPRESSION:   Acute cholecystitis.  Slightly abnormal appearance of the pancreatic head which should be   better characterized with MRI, which has already been scheduled.    < from: MR Abdomen w/ IV Cont (19 @ 13:52) >  IMPRESSION:   Acute cholecystitis. No choledocholithiasis. 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  A persistent filling defect in the right hepatic veinon post gadolinium   images is felt to be artifactual. The celiac axis is also not well   visualized on post gadolinium images but demonstrate a normal flow void   on T2-weighted images. These findings can be clarified on color Doppler   ultrasound.    < from: 12 Lead ECG (19 @ 20:07) >  Diagnosis Line Sinus tachycardia  Possible Left atrial enlargement  Nonspecific ST abnormality  Abnormal ECG    < from: Xray Chest 2 Views PA/Lat (19 @ 21:26) >  IMPRESSION:   Clear lungs. Patient is a 38y old  Female who presents with a chief complaint of Hyperbilirubinemia (2019 04:44)      SUBJECTIVE / OVERNIGHT EVENTS:  Patient seen and examined at bedside. Patient reports abdominal pain is tolerable at this point with pain meds. + nausea, but no vomiting. She is complaining of dry mouth and would like ice chips. Skin is still yellow. Urine is very dark in color. Denies fever, chills, ha, palpitations, vomiting, diarrhea, constipation, or rash.     Other Review of Systems Negative.    MEDICATIONS  (STANDING):  folic acid 1 milliGRAM(s) Oral daily  lactated ringers. 1000 milliLiter(s) (100 mL/Hr) IV Continuous <Continuous>  piperacillin/tazobactam IVPB.. 3.375 Gram(s) IV Intermittent every 8 hours    MEDICATIONS  (PRN):  HYDROmorphone  Injectable 0.5 milliGRAM(s) IV Push every 6 hours PRN Severe Pain (7 - 10)      OBJECTIVE:    Vital Signs Last 24 Hrs  T(C): 36.9 (2019 14:12), Max: 37.9 (2019 19:50)  T(F): 98.5 (2019 14:12), Max: 100.2 (2019 19:50)  HR: 107 (2019 14:12) (95 - 130)  BP: 114/67 (2019 14:12) (104/60 - 123/75)  BP(mean): --  RR: 20 (2019 14:12) (16 - 20)  SpO2: 100% (2019 14:12) (98% - 100%)    CAPILLARY BLOOD GLUCOSE    I&O's Summary    2019 07:01  -  2019 18:03  --------------------------------------------------------  IN: 0 mL / OUT: 600 mL / NET: -600 mL    PHYSICAL EXAM:  	GENERAL: NAD, well-developed  	EYES: EOMI, PERRL, jaundice sclera, sublingual jaundice   	NECK: Supple, No JVD  	CHEST/LUNG: Clear to auscultation bilaterally; No wheeze  	HEART: Regular rate and rhythm; No murmurs, rubs, or gallops  	ABDOMEN: Soft, tender abdomen throughout, +rebound, +guarding, + splenomegaly   	EXTREMITIES:, No clubbing, cyanosis, or edema  	PSYCH: AAOx3, nl affect, nl behavior  	NEUROLOGY: non-focal    SKIN: No rashes or lesions    LABS:                        6.9    13.71 )-----------( 159      ( 2019 11:50 )             21.6     Auto Eosinophil # x     / Auto Eosinophil % x     / Auto Neutrophil # x     / Auto Neutrophil % x     / BANDS % x                            6.5    11.86 )-----------( 164      ( 2019 06:00 )             20.7     Auto Eosinophil # x     / Auto Eosinophil % x     / Auto Neutrophil # x     / Auto Neutrophil % x     / BANDS % x                            9.4    21.39 )-----------( 252      ( 2019 21:00 )             28.6     Auto Eosinophil # 0.01  / Auto Eosinophil % 0.0   / Auto Neutrophil # 19.36 / Auto Neutrophil % 90.5  / BANDS % x        07    146<H>  |  111<H>  |  18  ----------------------------<  96  3.1<L>   |  24  |  0.54  25    145  |  108<H>  |  17  ----------------------------<  112<H>  3.6   |  23  |  0.60    Ca    8.7      2019 06:00  Mg     2.0       Phos  3.2       TPro  5.8<L>  /  Alb  4.0  /  TBili  34.1<H>  /  DBili  13.4<H>  /  AST  47<H>  /  ALT  93<H>  /  AlkPhos  67    TPro  6.6  /  Alb  4.8  /  TBili  39.1<H>  /  DBili  13.3<H>  /  AST  82<H>  /  ALT  122<H>  /  AlkPhos  77  07-25    PT/INR - ( 2019 11:50 )   PT: 16.3 SEC;   INR: 1.45          PTT - ( 2019 11:50 )  PTT:26.9 SEC      Urinalysis Basic - ( 2019 22:52 )    Color: ORANGE / Appearance: HAZY / S.030 / pH: 6.0  Gluc: NEGATIVE / Ketone: TRACE  / Bili: LARGE / Urobili: MODERATE   Blood: NEGATIVE / Protein: SMALL / Nitrite: NEGATIVE   Leuk Esterase: NEGATIVE / RBC: 0-2 / WBC 6-10   Sq Epi: x / Non Sq Epi: MODERATE / Bacteria: MODERATE      Lactate, Blood: 0.7 mmol/L ( @ 11:50)        ABG:     VBG: ( 21:00 ) - VBG - pH: 7.39  | pCO2: 47    | pO2: < 24  | Lactate: 1.4          Care Discussed with Consultants/Other Providers:    RADIOLOGY & ADDITIONAL TESTS:  (Imaging Personally Reviewed)    < from: US Abdomen Limited (19 @ 11:08) >  IMPRESSION:   Acute cholecystitis.  Slightly abnormal appearance of the pancreatic head which should be   better characterized with MRI, which has already been scheduled.    < from: MR Abdomen w/ IV Cont (19 @ 13:52) >  IMPRESSION:   Acute cholecystitis. No choledocholithiasis. 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  A persistent filling defect in the right hepatic veinon post gadolinium   images is felt to be artifactual. The celiac axis is also not well   visualized on post gadolinium images but demonstrate a normal flow void   on T2-weighted images. These findings can be clarified on color Doppler   ultrasound.    < from: 12 Lead ECG (19 @ 20:07) >  Diagnosis Line Sinus tachycardia  Possible Left atrial enlargement  Nonspecific ST abnormality  Abnormal ECG    < from: Xray Chest 2 Views PA/Lat (19 @ 21:26) >  IMPRESSION:   Clear lungs.

## 2019-07-26 NOTE — PROGRESS NOTE ADULT - PROBLEM SELECTOR PLAN 6
DVT PPX: improve score of .4 will encourage ambulation   Diet: NPO  Dispo: likely home no needs DVT PPX: improve score of .4 will encourage ambulation   Diet: NPO, ice chips and meds okay  Dispo: likely home no needs

## 2019-07-26 NOTE — H&P ADULT - PROBLEM SELECTOR PLAN 5
DVT PPX: improve score of .4 will encourage ambulation   Diet: NPO  Dispo: likely home no needs Patient takes folic acid at home, will continue home meds   Hgb 9.4, likely secondary to hemolysis . Anemia is normocytic  will check am folate, hapto, and LDH . Hapto and LDH 96% specific for   will check Retic Count %

## 2019-07-26 NOTE — PROGRESS NOTE ADULT - PROBLEM SELECTOR PLAN 5
Patient takes folic acid at home, will continue home meds   Hgb 9.4, likely secondary to hemolysis . Anemia is normocytic  will check am folate, hapto, and LDH . Hapto and LDH 96% specific for   will check Retic Count % Patient takes folic acid at home, will continue home meds   Hgb 9.4 to 6.5, likely secondary to hemolysis. Anemia is normocytic  - folate normal, hapto low, and LDH elevated. Hapto and LDH 96% specific for hemolysis  - Retic Count % elevated.   - Active T&S  - 1u pRBC given

## 2019-07-26 NOTE — H&P ADULT - ASSESSMENT
38 F PMH hereditary spherocytosis (diagnosed at [facility] 26 years ago), who presents with abdominal pain, hyperbilirubinemia admitted for acute pancreatitis meeting sepsis criteria

## 2019-07-26 NOTE — H&P ADULT - NSHPPHYSICALEXAM_GEN_ALL_CORE
PHYSICAL EXAM:  GENERAL: NAD, well-developed  HEAD:  Atraumatic, Normocephalic  EYES: EOMI, PERRLA, conjunctiva and sclera clear  NECK: Supple, No JVD  CHEST/LUNG: Clear to auscultation bilaterally; No wheeze  HEART: Regular rate and rhythm; No murmurs, rubs, or gallops  ABDOMEN: Soft, Nontender, Nondistended; Bowel sounds present  EXTREMITIES:, No clubbing, cyanosis, or edema  PSYCH: AAOx3  NEUROLOGY: non-focal  SKIN: No rashes or lesions PHYSICAL EXAM:  GENERAL: NAD, well-developed  HEAD:  Atraumatic, Normocephalic  EYES: EOMI, PERRLA, jaundice sclera, sublingual jaundice   NECK: Supple, No JVD  CHEST/LUNG: Clear to auscultation bilaterally; No wheeze  HEART: Regular rate and rhythm; No murmurs, rubs, or gallops  ABDOMEN: Soft, tender abdomen on left periumbilical area , + splenomegaly   EXTREMITIES:, No clubbing, cyanosis, or edema  PSYCH: AAOx3  NEUROLOGY: non-focal  SKIN: No rashes or lesions Vital Signs Last 24 Hrs  T(C): 36.8 (26 Jul 2019 04:49), Max: 37.9 (25 Jul 2019 19:50)  T(F): 98.3 (26 Jul 2019 04:49), Max: 100.2 (25 Jul 2019 19:50)  HR: 95 (26 Jul 2019 04:49) (95 - 130)  BP: 106/58 (26 Jul 2019 04:49) (106/58 - 123/75)  BP(mean): --  RR: 20 (26 Jul 2019 04:49) (18 - 20)  SpO2: 100% (26 Jul 2019 04:49) (100% - 100%)    PHYSICAL EXAM:  GENERAL: NAD, well-developed  EYES: EOMI, PERRL, jaundice sclera, sublingual jaundice   NECK: Supple, No JVD  CHEST/LUNG: Clear to auscultation bilaterally; No wheeze  HEART: Regular rate and rhythm; No murmurs, rubs, or gallops  ABDOMEN: Soft, tender abdomen on left periumbilical area , + splenomegaly   EXTREMITIES:, No clubbing, cyanosis, or edema  PSYCH: AAOx3  NEUROLOGY: non-focal  SKIN: No rashes or lesions Vital Signs Last 24 Hrs  T(C): 36.8 (26 Jul 2019 04:49), Max: 37.9 (25 Jul 2019 19:50)  T(F): 98.3 (26 Jul 2019 04:49), Max: 100.2 (25 Jul 2019 19:50)  HR: 95 (26 Jul 2019 04:49) (95 - 130)  BP: 106/58 (26 Jul 2019 04:49) (106/58 - 123/75)  BP(mean): --  RR: 20 (26 Jul 2019 04:49) (18 - 20)  SpO2: 100% (26 Jul 2019 04:49) (100% - 100%)    PHYSICAL EXAM:  GENERAL: NAD, well-developed  EYES: EOMI, PERRL, jaundice sclera, sublingual jaundice   NECK: Supple, No JVD  CHEST/LUNG: Clear to auscultation bilaterally; No wheeze  HEART: Regular rate and rhythm; No murmurs, rubs, or gallops  ABDOMEN: Soft, tender abdomen throughout, +rebound, +guarding, + splenomegaly   EXTREMITIES:, No clubbing, cyanosis, or edema  PSYCH: AAOx3, nl affect, nl behavior  NEUROLOGY: non-focal  SKIN: No rashes or lesions

## 2019-07-26 NOTE — PROGRESS NOTE ADULT - PROBLEM SELECTOR PLAN 2
Patient with two days of periumbilical abdominal pain radiating to her back   Meets  clinical criteria for pancreatitis 2/2 due to acute epigastric pain and Lipase>600   Likely 2/2 to pigmented stones, given likely hemolysis of blood cells from HS  Will give IVF and pain medication PRN Dilaudid .5 q 4 hours

## 2019-07-26 NOTE — H&P ADULT - NSHPREVIEWOFSYSTEMS_GEN_ALL_CORE
REVIEW OF SYSTEMS:  CONSTITUTIONAL: No weakness, fevers or chills  EYES/ENT: No visual changes;  No vertigo or throat pain   NECK: No pain or stiffness  RESPIRATORY: No cough, wheezing, hemoptysis; No shortness of breath  CARDIOVASCULAR: No chest pain or palpitations  GASTROINTESTINAL: No abdominal or epigastric pain. No nausea, vomiting, or hematemesis; No diarrhea or constipation. No melena or hematochezia.  GENITOURINARY: No dysuria, frequency or hematuria  NEUROLOGICAL: No numbness or weakness  SKIN: No itching, burning, rashes, or lesions   All other review of systems is negative unless indicated above. REVIEW OF SYSTEMS:  CONSTITUTIONAL: No weakness, fevers or chills  EYES/ENT: No visual changes;  No vertigo or throat pain   NECK: No pain or stiffness  RESPIRATORY: No cough, wheezing, hemoptysis; No shortness of breath  CARDIOVASCULAR: No chest pain or palpitations  GASTROINTESTINAL: + abdominal pain,  + nausea and vomiting , no hematemesis; No diarrhea or constipation. No melena or hematochezia.  GENITOURINARY: No dysuria, frequency, + hematuria   NEUROLOGICAL: No numbness or weakness  SKIN: No itching, burning, rashes, or lesions   All other review of systems is negative unless indicated above. REVIEW OF SYSTEMS:  CONSTITUTIONAL: No weakness, fevers or chills  EYES: No visual changes or eye discharge  ENT: No rhinorrhea or sore throat  NECK: No pain or stiffness  RESPIRATORY: No cough, wheezing, hemoptysis; No shortness of breath  CARDIOVASCULAR: No chest pain or palpitations  GASTROINTESTINAL: + abdominal pain,  + nausea and vomiting , no hematemesis; No diarrhea or constipation, passing gas though. No melena or hematochezia.  GENITOURINARY: No dysuria, frequency, + hematuria/dark urine  NEUROLOGICAL: No numbness or weakness  SKIN: No itching, burning, rashes, or lesions

## 2019-07-26 NOTE — PROGRESS NOTE ADULT - ASSESSMENT
38 F PMH hereditary spherocytosis (diagnosed at [facility] 26 years ago), who presents with abdominal pain, hyperbilirubinemia admitted for acute pancreatitis meeting sepsis criteria

## 2019-07-26 NOTE — H&P ADULT - NSHPLABSRESULTS_GEN_ALL_CORE
9.4    21.39 )-----------( 252      ( 25 Jul 2019 21:00 )             28.6   07-25    145  |  108<H>  |  17  ----------------------------<  112<H>  3.6   |  23  |  0.60    Ca    9.8      25 Jul 2019 21:00    TPro  6.6  /  Alb  4.8  /  TBili  39.1<H>  /  DBili  13.3<H>  /  AST  82<H>  /  ALT  122<H>  /  AlkPhos  77  07-25 LABS and ADDITIONAL STUDIES:                 9.4    21.39 )-----------( 252      ( 25 Jul 2019 21:00 )              28.6     07-25    145  |  108<H>  |  17  ----------------------------<  112<H>  3.6   |  23  |  0.60    Ca    9.8      25 Jul 2019 21:00    TPro  6.6  /  Alb  4.8  /  TBili  39.1<H>  /  DBili  13.3<H>  /  AST  82<H>  /  ALT  122<H>  /  AlkPhos  77  07-25    Blood Gas Venous Comprehensive (07.25.19 @ 21:00)    Blood Gas Venous - Lactate: 1.4: Please note updated reference range. mmol/L    < from: Xray Chest 2 Views PA/Lat (07.25.19 @ 21:26) >    ******PRELIMINARY REPORT******            INTERPRETATION:  no emergent findings    < end of copied text >    EKG - NSR at 111, L atrial enlargement, QTc 459, nl axis, no significant ST-T wave changes

## 2019-07-26 NOTE — H&P ADULT - PROBLEM SELECTOR PLAN 3
Bilirubin 39.1 , likely 2/2 to hemolysis given underlying HS.  Bilirubin found in urine, which explains patients " bloody urine" this past Thursday  However given that the patient meets sepsis criteria, cannot exclude acute cholangitis vs choledocholithiasis or CBD dilatation without further imaging vs necrotic pancreatitis  Will do stat CT abdomen and pelvis, Will  further evaluate with MR Abdomen to rule out CBD obstruction   Will cover empirically with cefepime and metronidazole for severe intra-abdominal infection Bilirubin 39.1 , likely 2/2 to hemolysis given underlying HS.  Bilirubin found in urine, which explains patients " bloody urine" this past Thursday  However given that the patient meets sepsis criteria, cannot exclude acute cholangitis vs choledocholithiasis or CBD dilatation without further imaging vs necrotic pancreatitis  Will do RUQ US to evaluate CBD dilation, Will  further evaluate with MR Abdomen to rule out CBD obstruction  Will cover empirically with cefepime and metronidazole for severe intra-abdominal infection

## 2019-07-26 NOTE — H&P ADULT - ATTENDING COMMENTS
Patient seen and examined on 7/26/19, case discussed with Dr. Celia Vera, agree with assessment and plan as above. This is a 38F with history of hereditary spherocytosis presents to the hospital with complaints of worsening L sided abdominal pain since Wednesday. Said that she has also noted associated emesis (NBNB) and darkening of her urine and chills over this time. Denies any recent BMs but states that she is passing flatus. Was trying to manage her pain at home PO NSAIDs was but not improving and therefore came to the hospital for further evaluation. Currently said that she is having worsening diffuse pain in her abdomen that seems to radiate to her back, denies any current fevers/chills, denies any other complaints.  - On examination, patient is noted to be tender diffusely in her abdomen, has +rebound and +guarding  - Lab work shows her to have a significantly elevated lipase and TBili (though she seems to have a baseline TBili of ~20)  - Also noted to have leukocytosis and tachycardia on admission concerning for sepsis    A/P: 38F with history of HS presents with pancreatitis c/b sepsis.  - Given patient's current examination showing +rebound, guarding, and worsening pain there is a concern that her pancreatitis is worsening. Suspect pancreatitis is 2/2 hepatobiliary disease though her LFTs don't support a cholestatic picture, her TG is wnl, she denies any significant EtOH use, not on any medications that are known to precipitate pancreatitis.  - For now would c/w IVF, NPO, pain control/symptom management  - Would obtain an urgent CT A/P for further evaluation of her worsening abdominal pain  - Would c/w empiric abx  - Care discussed with Dr. Celia Vera (admitting resident) and Dr. Luna Mast (Housestaff intern)

## 2019-07-26 NOTE — H&P ADULT - NSICDXPASTMEDICALHX_GEN_ALL_CORE_FT
PAST MEDICAL HISTORY:  Hereditary spherocytosis     IUP (intrauterine pregnancy), incidental 16 weeks    Mucous polyp of cervix

## 2019-07-26 NOTE — H&P ADULT - HISTORY OF PRESENT ILLNESS
38 F PMH hereditary spherocytosis here with 2 days of abdominal cramping, inability to tolerate PO and hematuria. No burning with urination, however notices LLQ pain with voiding. Has been having subjective fevers at home. No diarrhea. No recent sick contacts. No cough or difficulty breathing.    VS in the ED: Bp 123/75, , RR 18, T 100.2F, Saturating 100% 38 F PMH hereditary spherocytosis (diagnosed at [facility] 26 years ago), polyp hx presents to the ED with chief complaint of "bloody urine". She states that on Wednesday morning of this week she had cramping abdominal pain which she de     here with 2 days of abdominal cramping, inability to tolerate PO and hematuria. No burning with urination, however notices LLQ pain with voiding. Has been having subjective fevers at home. No diarrhea. No recent sick contacts. No cough or difficulty breathing.    VS in the ED: Bp 123/75, , RR 18, T 100.2F, Saturating 100% 38 F PMH hereditary spherocytosis (diagnosed at [facility] 26 years ago), polyp hx presents to the ED with chief complaint of "bloody urine". She states that on Wednesday morning of this week she had cramping abdominal pain which she describes as "laboring pains as if she was giving birth". She staets pain in located on the right lateral periumbilical area and is diffuse. She has to sleep on one side to relief discomfort. She has tried advil, but this does not relieve pain. She has taken warm baths, which soothes pain but feels both " cold and hot at the same time " when doing so. She denies fever, chills , night sweats, weight loss. Has LLQ when voiding. She denies nausea. Has experienced non remitting  vomiting. Was not able to tolerate anything po including the advil. She noticed her urine was " coca cola" colored once on thursday which alarmed her enough to go to the ED. She additionally notes yellowing of her eyes. Which she notes at times happens when she is stressed or tired.     VS in the ED: Bp 123/75, , RR 18, T 100.2F, Saturating 100% 38 F PMH hereditary spherocytosis (diagnosed at [facility] 26 years ago), polyp hx presents to the ED with chief complaint of "bloody urine". She states that on Wednesday morning of this week she had cramping abdominal pain which she describes as "laboring pains as if she was giving birth". She states pain is located on the right lateral periumbilical area and is diffuse. She has to sleep on one side to relief discomfort. She has tried advil, but this does not relieve pain. She has taken warm baths, which soothes pain but feels both " cold and hot at the same time " when doing so. She denies fever, chills , night sweats, weight loss. Has LLQ when voiding. She denies nausea. Has experienced non remitting  vomiting. Was not able to tolerate anything po including the advil. She noticed her urine was " coca cola" colored once on thursday which alarmed her enough to go to the ED. She additionally notes yellowing of her eyes. Which she notes at times happens when she is stressed or tired.     VS in the ED: Bp 123/75, , RR 18, T 100.2F, Saturating 100%. In the ED, her lab work was significant for leukocytosis and elevated TBili to ~40. She was given ibuprofen 600mg PO x1, toradol 15mg IVP x2, and cefepime 1g IVPB x1. She was then admitted to medicine.

## 2019-07-26 NOTE — PROGRESS NOTE ADULT - PROBLEM SELECTOR PLAN 4
AST 82,   Given cholestatic picture will further evaluate with MR abdomen    c/w IVF at rate of 100cc/ hour AST 82,   c/w IVF at rate of 100cc/ hour

## 2019-07-26 NOTE — H&P ADULT - PROBLEM SELECTOR PLAN 1
Meets infectious workup for leukocytosis 21.39, tachycardic to the 130's  Will attain blood cultures X2- to be followed  Will treat with broad spectrum IV abx with cefepime and metronidazole  for both gram negative coverage and anaerobic coverage   Continue IVF LR  100cc/ hour Meets infectious workup for leukocytosis 21.39, tachycardic to the 130's with source presumed to be intraabdominal given her complaints and physical examination  Will attain blood cultures X2- to be followed  Will treat with broad spectrum IV abx with cefepime and metronidazole for both gram negative coverage and anaerobic coverage   Continue IVF LR  100cc/ hour

## 2019-07-26 NOTE — H&P ADULT - PROBLEM SELECTOR PLAN 2
Patient with two days of periumbilical abdominal pain radiating to her back   Meets  clinical criteria for pancreatitis 2/2 due to acute epigastric pain and Lipase>600 3X the upper limit of normal.   Likely 2/2 to pigmental stones, given likely hemolysis of blood cells from HS  Will give IVF and pain medication PRN Dilaudid .5 q 4 hours Patient with two days of periumbilical abdominal pain radiating to her back   Meets  clinical criteria for pancreatitis 2/2 due to acute epigastric pain and Lipase>600   Likely 2/2 to pigmental stones, given likely hemolysis of blood cells from HS  Will give IVF and pain medication PRN Dilaudid .5 q 4 hours

## 2019-07-26 NOTE — H&P ADULT - PROBLEM SELECTOR PLAN 4
Patient takes folic acid at home, will continue home meds   Hgb 9.4, likely secondary to hemolysis . Anemia is normocytic  will check am folate, hapto, and LDH . Hapto and LDH 96% specific for   will check Retic Count % AST 82,   Given cholestatic picture will further evaluate with MR abdomen    c/w IVF at rate of 100cc/ hour

## 2019-07-26 NOTE — CONSULT NOTE ADULT - ASSESSMENT
ASSESSMENT: Ms. Domitila Bal is a pleasant 38 y.o. F who presented with a constellation of symptoms consistent with acute cholecystitis with possible choledocholithiasis d/t pigment stones secondary to increased hemolysis in the setting of hereditary spherocytosis, as well as concomitant pancreatitis. She was admitted to the Medicine service for blood transfusion, IV antibiotics, and aggressive fluid resuscitation. We evaluated for potential cholecystectomy during this visit. Given that the patient continues to convalesce, she will be more amenable to an outpatient follow up as her sepsis would need to be treated during this visit prior to surgical consideration. More so, she may require ERCP/MRCP prior as the presence of CBD stones cannot be ruled out.    PLAN:  - All plans per primary team  - Patient may benefit from GI follow up/inpatient evaluation  - Please have her follow up with Dr. Raphael Ybarra as an outpatient after discharge for elective cholecystectomy   - Plan to be discussed with Attending Surgeon, Dr. Raphael Ybarra, who was in agreement.

## 2019-07-26 NOTE — PROGRESS NOTE ADULT - PROBLEM SELECTOR PLAN 1
Meets infectious workup for leukocytosis 21.39, tachycardic to the 130's with source presumed to be intraabdominal given her complaints and physical examination  Will attain blood cultures X2- to be followed  s/p cefepime and metronidazole for both gram negative coverage and anaerobic coverage   Continue IVF LR  100cc/ hour  - start zosyn  - Electrolytes being corrected with KCL IV

## 2019-07-26 NOTE — CONSULT NOTE ADULT - SUBJECTIVE AND OBJECTIVE BOX
GENERAL SURGERY CONSULT NOTE  --------------------------------------------------------------------------------------------    HPI:  Ms. Domitila Bal is a 38 y.o. F with a significant PMH of hereditary spherocytosis who presented to the ED crampy abdominal pain and hematuria. She describes the pain as localized to the right lateral periumbilical area which was initially diffuse, with localization to the RUQ and mid-epigastric region. Her symptoms have been refractory OTC analgesics. She denies fever, chills , night sweats, weight loss but endorses LLQ when voiding. She has been anorexic with significant nausea and vomiting. In addition, she noticed significant yellowing of her eyes.     She was admitted to medicine. Labs revealed TBili 34.1, DBili 13.4, Lipase >600, Hb jose de jesus of 6.5. Further imaging revealed significant pigment stones, pericholecystic inflammation, no obvious CBD stones with mild CBD dilatation, GB wall thickening, Mirrizi syndrome. Patient was transfused and resuscitated accordingly, we were consulted for potential cholecystectomy in this visit.      PAST MEDICAL & SURGICAL HISTORY:  Mucous polyp of cervix  Hereditary spherocytosis   history:     FAMILY HISTORY:  Family history of hypertension  Family history of hereditary spherocytosis      ALLERGIES: No Known Allergies      CURRENT MEDICATIONS  MEDICATIONS (STANDING): folic acid 1 milliGRAM(s) Oral daily  lactated ringers. 1000 milliLiter(s) IV Continuous <Continuous>  piperacillin/tazobactam IVPB.. 3.375 Gram(s) IV Intermittent every 8 hours    MEDICATIONS (PRN):HYDROmorphone  Injectable 0.5 milliGRAM(s) IV Push every 4 hours PRN Severe Pain (7 - 10)    --------------------------------------------------------------------------------------------    Vitals:   T(C): 38.1 (19 @ 20:03), Max: 38.1 (19 @ 18:23)  HR: 112 (19 @ 18:23) (95 - 112)  BP: 104/59 (19 @ 18:23) (104/59 - 120/74)  RR: 19 (19 18:23) (16 - 20)  SpO2: 100% (19 @ 18:23) (98% - 100%)  CAPILLARY BLOOD GLUCOSE           @ 07:01  -   @ 21:37  --------------------------------------------------------  IN:    IV PiggyBack: 260 mL    lactated ringers.: 1000 mL  Total IN: 1260 mL    OUT:    Voided: 1300 mL  Total OUT: 1300 mL    Total NET: -40 mL        Height (cm): 162.6 ( @ 04:49)  Weight (kg): 71.3 ( @ 04:49)  BMI (kg/m2): 27 ( @ 04:49)  BSA (m2): 1.77 ( @ 04:49)      PHYSICAL EXAM:  General: No acute distress, cooperative.  Neuro: A+Ox3  HEENT: NC/AT, scleral icterus, icteric oral mucosa  Resp: Normal respiratory effort  GI/Abd: Soft, RUQ and midepigastric tenderness on light and deep palpation, other quadrants benign. Splenomegaly.  Skin: Intact, no breakdown, icteric nail beds    --------------------------------------------------------------------------------------------    LABS  CBC ( @ 11:50)                              6.9<LL>                         13.71<H>  )----------------(  159        --    % Neutrophils, --    % Lymphocytes, ANC: --                                  21.6<L>  CBC ( @ 06:00)                              6.5<LL>                         11.86<H>  )----------------(  164        --    % Neutrophils, --    % Lymphocytes, ANC: --                                  20.7<LL>    BMP ( @ :00)             146<H>  |  111<H>  |  18    		Ca++ --      Ca 8.7                ---------------------------------( 96    		Mg 2.0                3.1<L>  |  24      |  0.54  			Ph 3.2     BMP ( @ :00)             145     |  108<H>  |  17    		Ca++ --      Ca 9.8                ---------------------------------( 112<H>		Mg --                 3.6     |  23      |  0.60  			Ph --        LFTs ( @ 06:00)      TPro 5.8<L> / Alb 4.0 / TBili 34.1<H> / DBili 13.4<H> / AST 47<H> / ALT 93<H> / AlkPhos 67  LFTs (:00)      TPro 6.6 / Alb 4.8 / TBili 39.1<H> / DBili 13.3<H> / AST 82<H> / <H> / AlkPhos 77    Coags (:50)  aPTT 26.9<L> / INR 1.45<H> / PT 16.3<H>      ABG (:50)      /  /  /  /  / %     Lactate:  0.7    VBG (:00)     7.39 / 47 / < 24<L> / 26 / 3.3 / 27.6<L>%     Lactate: 1.4    --------------------------------------------------------------------------------------------    MICROBIOLOGY  Urinalysis ( @ 22:52):     Color: ORANGE / Appearance: HAZY / S.030 / pH: 6.0 / Gluc: NEGATIVE / Ketones: TRACE / Bili: LARGE<H> / Urobili: MODERATE<H> / Protein :SMALL / Nitrites: NEGATIVE / Leuk.Est: NEGATIVE / RBC: 0-2 / WBC: 6-10<H> / Sq Epi:  / Non Sq Epi: MODERATE / Bacteria MODERATE<H>         --------------------------------------------------------------------------------------------    IMAGING  < from: US Abdomen Limited (19 @ 11:08) >  FINDINGS:    Liver: Normal in echotexture without focal lesions.    Bile ducts: No intrahepatic or extrahepatic biliary ductal dilatation.   The common bile duct measures 5 mm.    Gallbladder: Contains multiple stones and is filled withsludge. The wall   is borderline measuring up to 3 mm. Trace amount of fluid in Morison's   pouch. The sonographic Wheeler's sign is positive, despite pain   medication.     Pancreas: The pancreatic head appears enlarged and somewhat heterogeneous.    Right kidney: Measures 11.3 cm in sagittal dimension. No hydronephrosis,   obvious mass, or calculus.    Peritoneum : Trace free fluid in the abdomen.    Vasculature: The visualized IVC and aorta are normal in appearance.     IMPRESSION:   Acute cholecystitis.  Slightly abnormal appearance of the pancreatic head which should be   better characterized with MRI, which has already been scheduled.    < end of copied text >  < from: MR Abdomen w/ IV Cont (19 @ 13:52) >  FINDINGS:    LOWER CHEST: Trace bilateral pleural effusions.    LIVER: Within normal limits.   BILE DUCTS: Slightly prominent right and left hepatic ducts with   compression of the proper hepatic duct by the neck of gallbladder. The   distal CBD is normal .  GALLBLADDER: Cholelithiasis, mural thickening and pericholecystic fluid.  SPLEEN: Enlarged measuring 17 cm in length. Tiny cyst inferiorly.  PANCREAS: Within normal limits.  ADRENALS: Within normal limits.  KIDNEYS/URETERS: Within normal limits.    VISUALIZED PORTIONS:    BOWEL: Within normal limits.   PERITONEUM: Small free fluid in the upper abdomen.  VESSELS: Normal flow-void identified in the origin of the celiac axis   which is not well visualized on the post gadolinium images. The right   hepatic vein also demonstrates a low signal on post gadolinium images,   which is also felt to be artifactual.  RETROPERITONEUM: No lymphadenopathy.    ABDOMINAL WALL: Within normal limits.  BONES: Within normal limits.    IMPRESSION:     Acute cholecystitis. No choledocholithiasis. 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  A persistent filling defect in the right hepatic veinon post gadolinium   images is felt to be artifactual. The celiac axis is also not well   visualized on post gadolinium images but demonstrate a normal flow void   on T2-weighted images. These findings can be clarified on color Doppler   ultrasound.      < end of copied text >    --------------------------------------------------------------------------------------------

## 2019-07-26 NOTE — PROGRESS NOTE ADULT - PROBLEM SELECTOR PLAN 3
Bilirubin 39.1, likely 2/2 to hemolysis given underlying HS.  Bilirubin found in urine, which explains patients " bloody urine" this past Thursday  However given that the patient meets sepsis criteria, cannot exclude acute cholangitis vs choledocholithiasis or CBD dilatation without further imaging vs necrotic pancreatitis  - RUQ US showing acute cholecystitis  - MR Abdomen  Will cover empirically with cefepime and metronidazole for severe intra-abdominal infection Bilirubin 39.1, likely 2/2 to hemolysis given underlying HS.  Bilirubin found in urine, which explains patients " bloody urine" this past Thursday  However given that the patient meets sepsis criteria, cannot exclude acute cholangitis vs choledocholithiasis or CBD dilatation without further imaging vs necrotic pancreatitis  - RUQ US showing acute cholecystitis  - MR Abdomen suggestive of Mirizzi's syndrome  - abx as above  - General surgery consulted. Pt needs to be medically optimized prior to surgical intervention.

## 2019-07-27 DIAGNOSIS — D58.0 HEREDITARY SPHEROCYTOSIS: ICD-10-CM

## 2019-07-27 DIAGNOSIS — K81.0 ACUTE CHOLECYSTITIS: ICD-10-CM

## 2019-07-27 LAB
ALBUMIN SERPL ELPH-MCNC: 3.7 G/DL — SIGNIFICANT CHANGE UP (ref 3.3–5)
ALP SERPL-CCNC: 61 U/L — SIGNIFICANT CHANGE UP (ref 40–120)
ALT FLD-CCNC: 59 U/L — HIGH (ref 4–33)
ANION GAP SERPL CALC-SCNC: 12 MMO/L — SIGNIFICANT CHANGE UP (ref 7–14)
AST SERPL-CCNC: 21 U/L — SIGNIFICANT CHANGE UP (ref 4–32)
BACTERIA UR CULT: SIGNIFICANT CHANGE UP
BILIRUB SERPL-MCNC: 33.2 MG/DL — HIGH (ref 0.2–1.2)
BUN SERPL-MCNC: 10 MG/DL — SIGNIFICANT CHANGE UP (ref 7–23)
CALCIUM SERPL-MCNC: 8.9 MG/DL — SIGNIFICANT CHANGE UP (ref 8.4–10.5)
CHLORIDE SERPL-SCNC: 105 MMOL/L — SIGNIFICANT CHANGE UP (ref 98–107)
CO2 SERPL-SCNC: 23 MMOL/L — SIGNIFICANT CHANGE UP (ref 22–31)
CREAT SERPL-MCNC: 0.44 MG/DL — LOW (ref 0.5–1.3)
GLUCOSE SERPL-MCNC: 96 MG/DL — SIGNIFICANT CHANGE UP (ref 70–99)
HCT VFR BLD CALC: 22.6 % — LOW (ref 34.5–45)
HCT VFR BLD CALC: 23.4 % — LOW (ref 34.5–45)
HGB BLD-MCNC: 7.4 G/DL — LOW (ref 11.5–15.5)
HGB BLD-MCNC: 7.7 G/DL — LOW (ref 11.5–15.5)
MAGNESIUM SERPL-MCNC: 1.8 MG/DL — SIGNIFICANT CHANGE UP (ref 1.6–2.6)
MCHC RBC-ENTMCNC: 28.6 PG — SIGNIFICANT CHANGE UP (ref 27–34)
MCHC RBC-ENTMCNC: 28.7 PG — SIGNIFICANT CHANGE UP (ref 27–34)
MCHC RBC-ENTMCNC: 32.7 % — SIGNIFICANT CHANGE UP (ref 32–36)
MCHC RBC-ENTMCNC: 32.9 % — SIGNIFICANT CHANGE UP (ref 32–36)
MCV RBC AUTO: 87 FL — SIGNIFICANT CHANGE UP (ref 80–100)
MCV RBC AUTO: 87.6 FL — SIGNIFICANT CHANGE UP (ref 80–100)
METHOD TYPE: SIGNIFICANT CHANGE UP
NRBC # FLD: 0.08 K/UL — SIGNIFICANT CHANGE UP (ref 0–0)
NRBC # FLD: 0.09 K/UL — SIGNIFICANT CHANGE UP (ref 0–0)
ORGANISM # SPEC MICROSCOPIC CNT: SIGNIFICANT CHANGE UP
ORGANISM # SPEC MICROSCOPIC CNT: SIGNIFICANT CHANGE UP
PHOSPHATE SERPL-MCNC: 2.4 MG/DL — LOW (ref 2.5–4.5)
PLATELET # BLD AUTO: 155 K/UL — SIGNIFICANT CHANGE UP (ref 150–400)
PLATELET # BLD AUTO: 157 K/UL — SIGNIFICANT CHANGE UP (ref 150–400)
PMV BLD: 10.1 FL — SIGNIFICANT CHANGE UP (ref 7–13)
PMV BLD: 10.2 FL — SIGNIFICANT CHANGE UP (ref 7–13)
POTASSIUM SERPL-MCNC: 3.2 MMOL/L — LOW (ref 3.5–5.3)
POTASSIUM SERPL-SCNC: 3.2 MMOL/L — LOW (ref 3.5–5.3)
PROT SERPL-MCNC: 5.8 G/DL — LOW (ref 6–8.3)
RBC # BLD: 2.58 M/UL — LOW (ref 3.8–5.2)
RBC # BLD: 2.69 M/UL — LOW (ref 3.8–5.2)
RBC # FLD: 22.6 % — HIGH (ref 10.3–14.5)
RBC # FLD: 22.8 % — HIGH (ref 10.3–14.5)
SODIUM SERPL-SCNC: 140 MMOL/L — SIGNIFICANT CHANGE UP (ref 135–145)
SPECIMEN SOURCE: SIGNIFICANT CHANGE UP
VIT B12 SERPL-MCNC: 422 PG/ML — SIGNIFICANT CHANGE UP (ref 200–900)
WBC # BLD: 13.23 K/UL — HIGH (ref 3.8–10.5)
WBC # BLD: 13.97 K/UL — HIGH (ref 3.8–10.5)
WBC # FLD AUTO: 13.23 K/UL — HIGH (ref 3.8–10.5)
WBC # FLD AUTO: 13.97 K/UL — HIGH (ref 3.8–10.5)

## 2019-07-27 PROCEDURE — 99233 SBSQ HOSP IP/OBS HIGH 50: CPT | Mod: GC

## 2019-07-27 RX ORDER — POTASSIUM CHLORIDE 20 MEQ
10 PACKET (EA) ORAL
Refills: 0 | Status: COMPLETED | OUTPATIENT
Start: 2019-07-27 | End: 2019-07-27

## 2019-07-27 RX ORDER — SODIUM CHLORIDE 9 MG/ML
1000 INJECTION, SOLUTION INTRAVENOUS
Refills: 0 | Status: COMPLETED | OUTPATIENT
Start: 2019-07-27 | End: 2019-07-27

## 2019-07-27 RX ORDER — POTASSIUM PHOSPHATE, MONOBASIC POTASSIUM PHOSPHATE, DIBASIC 236; 224 MG/ML; MG/ML
15 INJECTION, SOLUTION INTRAVENOUS ONCE
Refills: 0 | Status: COMPLETED | OUTPATIENT
Start: 2019-07-27 | End: 2019-07-27

## 2019-07-27 RX ORDER — ACETAMINOPHEN 500 MG
650 TABLET ORAL ONCE
Refills: 0 | Status: COMPLETED | OUTPATIENT
Start: 2019-07-27 | End: 2019-07-27

## 2019-07-27 RX ADMIN — PIPERACILLIN AND TAZOBACTAM 25 GRAM(S): 4; .5 INJECTION, POWDER, LYOPHILIZED, FOR SOLUTION INTRAVENOUS at 14:01

## 2019-07-27 RX ADMIN — POTASSIUM PHOSPHATE, MONOBASIC POTASSIUM PHOSPHATE, DIBASIC 62.5 MILLIMOLE(S): 236; 224 INJECTION, SOLUTION INTRAVENOUS at 10:27

## 2019-07-27 RX ADMIN — Medication 1 MILLIGRAM(S): at 14:01

## 2019-07-27 RX ADMIN — Medication 100 MILLIEQUIVALENT(S): at 13:53

## 2019-07-27 RX ADMIN — Medication 650 MILLIGRAM(S): at 23:03

## 2019-07-27 RX ADMIN — SODIUM CHLORIDE 150 MILLILITER(S): 9 INJECTION, SOLUTION INTRAVENOUS at 15:04

## 2019-07-27 RX ADMIN — Medication 650 MILLIGRAM(S): at 23:35

## 2019-07-27 RX ADMIN — SODIUM CHLORIDE 100 MILLILITER(S): 9 INJECTION, SOLUTION INTRAVENOUS at 10:27

## 2019-07-27 RX ADMIN — PIPERACILLIN AND TAZOBACTAM 25 GRAM(S): 4; .5 INJECTION, POWDER, LYOPHILIZED, FOR SOLUTION INTRAVENOUS at 06:36

## 2019-07-27 RX ADMIN — Medication 100 MILLIEQUIVALENT(S): at 10:27

## 2019-07-27 RX ADMIN — Medication 100 MILLIEQUIVALENT(S): at 11:13

## 2019-07-27 RX ADMIN — PIPERACILLIN AND TAZOBACTAM 25 GRAM(S): 4; .5 INJECTION, POWDER, LYOPHILIZED, FOR SOLUTION INTRAVENOUS at 21:24

## 2019-07-27 NOTE — PROGRESS NOTE ADULT - ASSESSMENT
38F PMHx hereditary spherocytosis (diagnosed at [facility] 26 years ago), who presents with abdominal pain, hyperbilirubinemia admitted for acute pancreatitis and acute cholecystis in the setting of gallstones 2/2 to chronic hemolysis from hereditary spherocytosis. 38F PMHx hereditary spherocytosis (diagnosed at [facility] 26 years ago), who presents with abdominal pain, hyperbilirubinemia admitted for sepsis d/t acute pancreatitis and acute cholecystis in the setting of gallstones 2/2 to chronic hemolysis from hereditary spherocytosis.

## 2019-07-27 NOTE — CONSULT NOTE ADULT - ATTENDING COMMENTS
I have reviewed imaging   There is a possible Mirizzi syndrome from an obstructed GB/cystic duct  Her Bili is elevated but this is a mixed picture owing to her hereditary spherocytosis  Plan for ERCP/ Stent

## 2019-07-27 NOTE — PROGRESS NOTE ADULT - SUBJECTIVE AND OBJECTIVE BOX
Mala Rose PGY 2  Pager: [phone]/ [phone]    Patient is a 38y old  Female who presents with a chief complaint of Hyperbilirubinemia (2019 21:36)      SUBJECTIVE / OVERNIGHT EVENTS:  Patient seen and examined at bedside. Patient reports her pain is much better today and more localized to left upper quadrant. she does not feel nauseated or hungry.     Other Review of Systems Negative.    MEDICATIONS  (STANDING):  folic acid 1 milliGRAM(s) Oral daily  lactated ringers. 1000 milliLiter(s) (100 mL/Hr) IV Continuous <Continuous>  piperacillin/tazobactam IVPB.. 3.375 Gram(s) IV Intermittent every 8 hours  potassium chloride  10 mEq/100 mL IVPB 10 milliEquivalent(s) IV Intermittent every 1 hour    MEDICATIONS  (PRN):  HYDROmorphone  Injectable 0.5 milliGRAM(s) IV Push every 4 hours PRN Severe Pain (7 - 10)      OBJECTIVE:    Vital Signs Last 24 Hrs  T(C): 37.7 (2019 06:34), Max: 38.3 (2019 21:40)  T(F): 99.8 (2019 06:34), Max: 100.9 (2019 21:40)  HR: 109 (2019 06:34) (100 - 112)  BP: 109/49 (2019 06:34) (104/59 - 114/67)  BP(mean): --  RR: 16 (2019 06:34) (16 - 20)  SpO2: 100% (2019 06:34) (99% - 100%)    CAPILLARY BLOOD GLUCOSE        I&O's Summary    2019 07:01  -  2019 07:00  --------------------------------------------------------  IN: 1260 mL / OUT: 1300 mL / NET: -40 mL        PHYSICAL EXAM:  GENERAL: NAD, well-developed, laying in bed, speaking in full sentences, appears stated age  EYES: EOMI, PERRLA, scleral icterus  NECK: Supple, No JVD.   CHEST/LUNG: Clear to auscultation bilaterally; No wheezes  HEART: Regular rate and rhythm; No murmurs, rubs, or gallops  ABDOMEN: Soft, tender to palpation in left upper quadrant, + BS  EXTREMITIES:  2+ Peripheral Pulses, No clubbing, cyanosis, or edema  PSYCH: AAOx3  NEUROLOGY: non-focal  SKIN: No rashes or lesions    LABS:                        7.4    13.23 )-----------( 157      ( 2019 07:51 )             22.6     Auto Eosinophil # x     / Auto Eosinophil % x     / Auto Neutrophil # x     / Auto Neutrophil % x     / BANDS % x                            6.9    13.71 )-----------( 159      ( 2019 11:50 )             21.6     Auto Eosinophil # x     / Auto Eosinophil % x     / Auto Neutrophil # x     / Auto Neutrophil % x     / BANDS % x                            6.5    11.86 )-----------( 164      ( 2019 06:00 )             20.7     Auto Eosinophil # x     / Auto Eosinophil % x     / Auto Neutrophil # x     / Auto Neutrophil % x     / BANDS % x        07-27    140  |  105  |  10  ----------------------------<  96  3.2<L>   |  23  |  0.44<L>  07-26    146<H>  |  111<H>  |  18  ----------------------------<  96  3.1<L>   |  24  |  0.54  07-25    145  |  108<H>  |  17  ----------------------------<  112<H>  3.6   |  23  |  0.60    Ca    8.9      2019 07:51  Mg     1.8     07  Phos  2.4     07  TPro  5.8<L>  /  Alb  3.7  /  TBili  33.2<H>  /  DBili  x   /  AST  21  /  ALT  59<H>  /  AlkPhos  61  07-27  TPro  5.8<L>  /  Alb  4.0  /  TBili  34.1<H>  /  DBili  13.4<H>  /  AST  47<H>  /  ALT  93<H>  /  AlkPhos  67    TPro  6.6  /  Alb  4.8  /  TBili  39.1<H>  /  DBili  13.3<H>  /  AST  82<H>  /  ALT  122<H>  /  AlkPhos  77      PT/INR - ( 2019 11:50 )   PT: 16.3 SEC;   INR: 1.45          PTT - ( 2019 11:50 )  PTT:26.9 SEC      Urinalysis Basic - ( 2019 22:52 )    Color: ORANGE / Appearance: HAZY / S.030 / pH: 6.0  Gluc: NEGATIVE / Ketone: TRACE  / Bili: LARGE / Urobili: MODERATE   Blood: NEGATIVE / Protein: SMALL / Nitrite: NEGATIVE   Leuk Esterase: NEGATIVE / RBC: 0-2 / WBC 6-10   Sq Epi: x / Non Sq Epi: MODERATE / Bacteria: MODERATE      Lactate, Blood: 0.7 mmol/L ( @ 11:50)      < from: MR Abdomen w/ IV Cont (19 @ 13:52) >    Acute cholecystitis. No choledocholithiasis. 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  A persistent filling defect in the right hepatic veinon post gadolinium   images is felt to be artifactual. The celiac axis is also not well   visualized on post gadolinium images but demonstrate a normal flow void   on T2-weighted images. These findings can be clarified on color Doppler   ultrasound.          < end of copied text >

## 2019-07-27 NOTE — PROGRESS NOTE ADULT - PROBLEM SELECTOR PLAN 4
- acute on chronic hemolysis likely in setting of acute infection  - s/p 1 unit PRBC with suboptimal response  - will check CBC q12 hours and transfuse as needed  - elevated bili in setting of hemolysis and acute quincy

## 2019-07-27 NOTE — PROGRESS NOTE ADULT - PROBLEM SELECTOR PLAN 3
- Meets infectious workup for leukocytosis 21.39, tachycardic to the 130's with source presumed to be intraabdominal given her complaints and physical examination  - c/w zosyn for anaerobic and gram neg coverage  - blood cultures with 1 bottle growing gram + rods with neg PCR  - follow up repeat cx  Continue IVF LR  100cc/ hour - Patient with two days of periumbilical abdominal pain radiating to her back   Meets  clinical criteria for pancreatitis 2/2 due to acute epigastric pain and Lipase>600. Cause Likely 2/2 to pigmented stones, given likely hemolysis of blood cells from HS  -c/w  IVF and pain medication PRN Dilaudid .5 q 4 hours

## 2019-07-27 NOTE — CONSULT NOTE ADULT - ASSESSMENT
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 Cholecystitis with Mirrizi syndrome. Currently responding well to medical therapy. Likely due to Pigment stones. Primarily indirect Hyperbilirubinemia to 36 (Direct is 12). Baseline bilirubin is around 20.    2. Elevated Lipase but normal pancreas on imaging likely mild gallstone pancreatitis    3. 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 Cholecystitis with Mirrizi syndrome. Currently responding well to medical therapy. Likely due to Pigment stones. Primarily indirect Hyperbilirubinemia to 36 (Direct is 12). Baseline bilirubin is around 20.    2. Elevated Lipase but normal pancreas on imaging likely mild gallstone pancreatitis    3. Spherocytosis    4. 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 Cholecystitis with Mirrizi syndrome. Currently responding well to medical therapy. Likely due to Pigment stones. Primarily indirect Hyperbilirubinemia to 36 (Direct is 12). Baseline bilirubin is around 20.    2. Elevated Lipase but normal pancreas on imaging likely mild gallstone pancreatitis    3. Spherocytosis    4. Filling defect of hepatic vein on MR likley artifact    Recommendation:  -agressive IVF  -continue Abx  -ERCP on Monday prior to cholecystectomy   -doppler US to r/o hepatic vein thrombosis

## 2019-07-27 NOTE — PROGRESS NOTE ADULT - SUBJECTIVE AND OBJECTIVE BOX
GENERAL SURGERY DAILY PROGRESS NOTE:    Interval:  No acute events overnight.     Subjective:  Patient seen and examined. Reports pain is improved. Denies N/V.     Vital Signs Last 24 Hrs  T(C): 37.3 (2019 14:26), Max: 38.3 (2019 21:40)  T(F): 99.2 (2019 14:26), Max: 100.9 (2019 21:40)  HR: 113 (2019 14:26) (100 - 113)  BP: 101/59 (2019 14:26) (101/59 - 114/56)  RR: 17 (2019 14:26) (16 - 19)  SpO2: 99% (2019 14:26) (99% - 100%)    Exam:  Gen: NAD, resting in bed, alert and responding appropriately  Resp: Airway patent, non-labored respirations  Abd: Soft, ND, NT, no rebound or guarding  Ext: WWP      I&O's Detail    2019 07:01  -  2019 07:00  --------------------------------------------------------  IN:    IV PiggyBack: 260 mL    lactated ringers.: 1000 mL  Total IN: 1260 mL    OUT:    Voided: 1300 mL  Total OUT: 1300 mL    Total NET: -40 mL      MEDICATIONS  (STANDING):  folic acid 1 milliGRAM(s) Oral daily  lactated ringers. 1000 milliLiter(s) (150 mL/Hr) IV Continuous <Continuous>  piperacillin/tazobactam IVPB.. 3.375 Gram(s) IV Intermittent every 8 hours    MEDICATIONS  (PRN):  HYDROmorphone  Injectable 0.5 milliGRAM(s) IV Push every 4 hours PRN Severe Pain (7 - 10)      LABS:                        7.7    13.97 )-----------( 155      ( 2019 15:25 )             23.4         140  |  105  |  10  ----------------------------<  96  3.2<L>   |  23  |  0.44<L>    Ca    8.9      2019 07:51  Phos  2.4       Mg     1.8         TPro  5.8<L>  /  Alb  3.7  /  TBili  33.2<H>  /  DBili  x   /  AST  21  /  ALT  59<H>  /  AlkPhos  61      PT/INR - ( 2019 11:50 )   PT: 16.3 SEC;   INR: 1.45          PTT - ( 2019 11:50 )  PTT:26.9 SEC  Urinalysis Basic - ( 2019 22:52 )    Color: ORANGE / Appearance: HAZY / S.030 / pH: 6.0  Gluc: NEGATIVE / Ketone: TRACE  / Bili: LARGE / Urobili: MODERATE   Blood: NEGATIVE / Protein: SMALL / Nitrite: NEGATIVE   Leuk Esterase: NEGATIVE / RBC: 0-2 / WBC 6-10   Sq Epi: x / Non Sq Epi: MODERATE / Bacteria: MODERATE

## 2019-07-27 NOTE — PROGRESS NOTE ADULT - ASSESSMENT
38F hx hereditary spherocytosis admitted for imaging showing Mirizzi syndrome and pancreatitis     - ERCP per GI, they are planning for Monday tentatively  - Pain control  - Zosyn  - Trend T bili, monitor vitals  - Rest of care per primary, please call w/ questions    DAVID Allen, PGY-2  Acute Care Surgery (B Team)  [id number] with any questions

## 2019-07-27 NOTE — PROGRESS NOTE ADULT - PROBLEM SELECTOR PLAN 5
- Bilirubin 39.1, likely 2/2 to hemolysis given underlying HS and acute quincy   - c/t trend (39--> 33); last labs from 2015 at which time bili= 24

## 2019-07-27 NOTE — CONSULT NOTE ADULT - SUBJECTIVE AND OBJECTIVE BOX
Chief Complaint:  Patient is a 38y old  Female who presents with a chief complaint of Hyperbilirubinemia (2019 17:35)      HPI:  This is a 38 year old female with a history of hereditary spherocytosis, gallstones who presented to the hospital with L sided mid-abdominal pain. She first started having intractable n/v on Wednesday of NBNB emesis. She denies diarrhea or sick contacts. She then developed severe constant L sided abdominal pain. She denies fevers prior to arrival at the hospital. She denies ethanol use. She denies diarrhea.  On admission she was noted to have abnormal LFTs and US demonstrated acute cholecystitis. Lipase was eleated as well.  At this time her pain is much improved.  She works as a life-/therapist.  Her only medicine is folic acid.  Allergies:  No Known Allergies      Home Medications:    Hospital Medications:  folic acid 1 milliGRAM(s) Oral daily  HYDROmorphone  Injectable 0.5 milliGRAM(s) IV Push every 4 hours PRN  lactated ringers. 1000 milliLiter(s) IV Continuous <Continuous>  piperacillin/tazobactam IVPB.. 3.375 Gram(s) IV Intermittent every 8 hours      PMHX/PSHX:  IUP (intrauterine pregnancy), incidental  Mucous polyp of cervix  Hereditary spherocytosis   history      Family history:  Family history of hypertension  Family history of hereditary spherocytosis      Social History:     ROS:     General:  chills.  Eyes:  Good vision, no reported pain  ENT:  No sore throat, pain, runny nose, dysphagia  CV:  No pain, palpitations, hypo/hypertension  Resp:  No dyspnea, cough, tachypnea, wheezing  GI:  See HPI  :  No pain, bleeding, incontinence, nocturia  Muscle:  No pain, weakness  Neuro:  No weakness, tingling, memory problems  Psych:  No fatigue, insomnia, mood problems, depression  Endocrine:  No polyuria, polydipsia, cold/heat intolerance  Heme:  No petechiae, ecchymosis, easy bruisability  Skin:  No rash, edema      PHYSICAL EXAM:     GENERAL:  Appears stated age, well-groomed, well-nourished, no distress  HEENT:  icterus  CHEST:  Full & symmetric excursion, no increased effort, breath sounds clear  HEART:  Regular rhythm, S1, S2, no murmur/rub/S3/S4, no abdominal bruit, no edema  ABDOMEN:  Soft, mildly-tender, non-distended, normoactive bowel sounds,  no masses , no hepatosplenomegaly  EXTREMITIES:  no cyanosis,clubbing or edema  SKIN:  No rash/erythema/ecchymoses/petechiae/wounds/abscess/warm/dry  NEURO:  Alert, oriented    Vital Signs:  Vital Signs Last 24 Hrs  T(C): 37.3 (2019 14:26), Max: 38.3 (2019 21:40)  T(F): 99.2 (2019 14:26), Max: 100.9 (2019 21:40)  HR: 113 (2019 14:26) (100 - 113)  BP: 101/59 (2019 14:26) (101/59 - 114/56)  BP(mean): --  RR: 17 (2019 14:26) (16 - 18)  SpO2: 99% (2019 14:26) (99% - 100%)  Daily     Daily     LABS:                        7.7    13.97 )-----------( 155      ( 2019 15:25 )             23.4     07-27    140  |  105  |  10  ----------------------------<  96  3.2<L>   |  23  |  0.44<L>    Ca    8.9      2019 07:51  Phos  2.4     07-27  Mg     1.8     07-27    TPro  5.8<L>  /  Alb  3.7  /  TBili  33.2<H>  /  DBili  x   /  AST  21  /  ALT  59<H>  /  AlkPhos  61  07-27    LIVER FUNCTIONS - ( 2019 07:51 )  Alb: 3.7 g/dL / Pro: 5.8 g/dL / ALK PHOS: 61 u/L / ALT: 59 u/L / AST: 21 u/L / GGT: x           PT/INR - ( 2019 11:50 )   PT: 16.3 SEC;   INR: 1.45          PTT - ( 2019 11:50 )  PTT:26.9 SEC  Urinalysis Basic - ( 2019 22:52 )    Color: ORANGE / Appearance: HAZY / S.030 / pH: 6.0  Gluc: NEGATIVE / Ketone: TRACE  / Bili: LARGE / Urobili: MODERATE   Blood: NEGATIVE / Protein: SMALL / Nitrite: NEGATIVE   Leuk Esterase: NEGATIVE / RBC: 0-2 / WBC 6-10   Sq Epi: x / Non Sq Epi: MODERATE / Bacteria: MODERATE          Imaging:

## 2019-07-27 NOTE — PROGRESS NOTE ADULT - PROBLEM SELECTOR PLAN 7
- Patient takes folic acid at home, will continue home meds   - Hgb 9.4 to 6.5, likely secondary to hemolysis. Anemia is normocytic  - folate normal, hapto low, and LDH elevated. Hapto and LDH 96% specific for hemolysis  - Retic Count % elevated.   - Active T&S  - 1u pRBC given--> CBC q12

## 2019-07-27 NOTE — PROGRESS NOTE ADULT - PROBLEM SELECTOR PLAN 2
- Patient with two days of periumbilical abdominal pain radiating to her back   Meets  clinical criteria for pancreatitis 2/2 due to acute epigastric pain and Lipase>600   - Likely 2/2 to pigmented stones, given likely hemolysis of blood cells from HS  c/w  IVF and pain medication PRN Dilaudid .5 q 4 hours - Meets infectious workup for leukocytosis 21.39, tachycardic to the 130's with source presumed to be intraabdominal given her complaints and physical examination  - c/w zosyn for anaerobic and gram neg coverage  - blood cultures with 1 bottle growing gram + rods with neg PCR  - follow up repeat cx  Continue IVF LR  100cc/ hour

## 2019-07-27 NOTE — PROGRESS NOTE ADULT - PROBLEM SELECTOR PLAN 1
- patient presenting with abdominal pain with US and MR findings of acute cholecystitis and 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 to chronic gallstones leading to biliary stasis and chronic inflammation)  - surgery recs appreciated, no plan for cholecystectomy, recommend GI evaluation for ERCP  - Gi consulted, awaiting recs 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 to chronic gallstones leading to biliary stasis and chronic inflammation)  -MRCP ordered r/o choledocholithiasis   -surgery recs appreciated, no plan for cholecystectomy, recommend GI evaluation for ERCP  - Gi consulted, awaiting recs  - abx as below

## 2019-07-27 NOTE — PROGRESS NOTE ADULT - PROBLEM SELECTOR PLAN 8
DVT PPX: improve score of .4 will encourage ambulation   Diet: NPO, ice chips and meds okay  Dispo: likely home no needs

## 2019-07-28 LAB
-  CANDIDA ALBICANS: SIGNIFICANT CHANGE UP
-  CANDIDA GLABRATA: SIGNIFICANT CHANGE UP
-  CANDIDA KRUSEI: SIGNIFICANT CHANGE UP
-  CANDIDA PARAPSILOSIS: SIGNIFICANT CHANGE UP
-  CANDIDA TROPICALIS: SIGNIFICANT CHANGE UP
-  COAGULASE NEGATIVE STAPHYLOCOCCUS: SIGNIFICANT CHANGE UP
-  K. PNEUMONIAE GROUP: SIGNIFICANT CHANGE UP
-  KPC RESISTANCE GENE: SIGNIFICANT CHANGE UP
-  STREPTOCOCCUS SP. (NOT GRP A, B OR S PNEUMONIAE): SIGNIFICANT CHANGE UP
A BAUMANNII DNA SPEC QL NAA+PROBE: SIGNIFICANT CHANGE UP
ALBUMIN SERPL ELPH-MCNC: 3.8 G/DL — SIGNIFICANT CHANGE UP (ref 3.3–5)
ALP SERPL-CCNC: 67 U/L — SIGNIFICANT CHANGE UP (ref 40–120)
ALT FLD-CCNC: 40 U/L — HIGH (ref 4–33)
ANION GAP SERPL CALC-SCNC: 14 MMO/L — SIGNIFICANT CHANGE UP (ref 7–14)
AST SERPL-CCNC: 14 U/L — SIGNIFICANT CHANGE UP (ref 4–32)
BACTERIA BLD CULT: SIGNIFICANT CHANGE UP
BILIRUB SERPL-MCNC: 29.3 MG/DL — HIGH (ref 0.2–1.2)
BUN SERPL-MCNC: 7 MG/DL — SIGNIFICANT CHANGE UP (ref 7–23)
CALCIUM SERPL-MCNC: 8.9 MG/DL — SIGNIFICANT CHANGE UP (ref 8.4–10.5)
CHLORIDE SERPL-SCNC: 99 MMOL/L — SIGNIFICANT CHANGE UP (ref 98–107)
CO2 SERPL-SCNC: 25 MMOL/L — SIGNIFICANT CHANGE UP (ref 22–31)
CREAT SERPL-MCNC: 0.45 MG/DL — LOW (ref 0.5–1.3)
E CLOAC COMP DNA BLD POS QL NAA+PROBE: SIGNIFICANT CHANGE UP
E COLI DNA BLD POS QL NAA+NON-PROBE: SIGNIFICANT CHANGE UP
ENTEROCOC DNA BLD POS QL NAA+NON-PROBE: SIGNIFICANT CHANGE UP
ENTEROCOC DNA BLD POS QL NAA+NON-PROBE: SIGNIFICANT CHANGE UP
GLUCOSE SERPL-MCNC: 112 MG/DL — HIGH (ref 70–99)
GP B STREP DNA BLD POS QL NAA+NON-PROBE: SIGNIFICANT CHANGE UP
HAEM INFLU DNA BLD POS QL NAA+NON-PROBE: SIGNIFICANT CHANGE UP
HCT VFR BLD CALC: 23.7 % — LOW (ref 34.5–45)
HGB BLD-MCNC: 8 G/DL — LOW (ref 11.5–15.5)
K OXYTOCA DNA BLD POS QL NAA+NON-PROBE: SIGNIFICANT CHANGE UP
L MONOCYTOG DNA BLD POS QL NAA+NON-PROBE: SIGNIFICANT CHANGE UP
MCHC RBC-ENTMCNC: 29 PG — SIGNIFICANT CHANGE UP (ref 27–34)
MCHC RBC-ENTMCNC: 33.8 % — SIGNIFICANT CHANGE UP (ref 32–36)
MCV RBC AUTO: 85.9 FL — SIGNIFICANT CHANGE UP (ref 80–100)
MRSA SPEC QL CULT: SIGNIFICANT CHANGE UP
MSSA DNA SPEC QL NAA+PROBE: SIGNIFICANT CHANGE UP
N MEN ISLT CULT: SIGNIFICANT CHANGE UP
NRBC # FLD: 0.1 K/UL — SIGNIFICANT CHANGE UP (ref 0–0)
ORGANISM # SPEC MICROSCOPIC CNT: SIGNIFICANT CHANGE UP
P AERUGINOSA DNA BLD POS NAA+NON-PROBE: SIGNIFICANT CHANGE UP
PLATELET # BLD AUTO: 166 K/UL — SIGNIFICANT CHANGE UP (ref 150–400)
PMV BLD: 10.6 FL — SIGNIFICANT CHANGE UP (ref 7–13)
POTASSIUM SERPL-MCNC: 3 MMOL/L — LOW (ref 3.5–5.3)
POTASSIUM SERPL-SCNC: 3 MMOL/L — LOW (ref 3.5–5.3)
PROT SERPL-MCNC: 6.2 G/DL — SIGNIFICANT CHANGE UP (ref 6–8.3)
RBC # BLD: 2.76 M/UL — LOW (ref 3.8–5.2)
RBC # FLD: 21.7 % — HIGH (ref 10.3–14.5)
S MARCESCENS DNA BLD POS NAA+NON-PROBE: SIGNIFICANT CHANGE UP
S PNEUM DNA BLD POS QL NAA+NON-PROBE: SIGNIFICANT CHANGE UP
S PYO DNA BLD POS QL NAA+NON-PROBE: SIGNIFICANT CHANGE UP
SODIUM SERPL-SCNC: 138 MMOL/L — SIGNIFICANT CHANGE UP (ref 135–145)
SPECIMEN SOURCE: SIGNIFICANT CHANGE UP
SPECIMEN SOURCE: SIGNIFICANT CHANGE UP
WBC # BLD: 11.52 K/UL — HIGH (ref 3.8–10.5)
WBC # FLD AUTO: 11.52 K/UL — HIGH (ref 3.8–10.5)

## 2019-07-28 PROCEDURE — 99233 SBSQ HOSP IP/OBS HIGH 50: CPT | Mod: GC

## 2019-07-28 PROCEDURE — 99222 1ST HOSP IP/OBS MODERATE 55: CPT | Mod: GC

## 2019-07-28 RX ORDER — POTASSIUM CHLORIDE 20 MEQ
40 PACKET (EA) ORAL ONCE
Refills: 0 | Status: COMPLETED | OUTPATIENT
Start: 2019-07-28 | End: 2019-07-28

## 2019-07-28 RX ORDER — ONDANSETRON 8 MG/1
4 TABLET, FILM COATED ORAL EVERY 6 HOURS
Refills: 0 | Status: DISCONTINUED | OUTPATIENT
Start: 2019-07-28 | End: 2019-08-02

## 2019-07-28 RX ORDER — POTASSIUM CHLORIDE 20 MEQ
10 PACKET (EA) ORAL
Refills: 0 | Status: COMPLETED | OUTPATIENT
Start: 2019-07-28 | End: 2019-07-28

## 2019-07-28 RX ORDER — POTASSIUM CHLORIDE 20 MEQ
40 PACKET (EA) ORAL EVERY 4 HOURS
Refills: 0 | Status: DISCONTINUED | OUTPATIENT
Start: 2019-07-28 | End: 2019-07-28

## 2019-07-28 RX ORDER — SODIUM CHLORIDE 9 MG/ML
1000 INJECTION INTRAMUSCULAR; INTRAVENOUS; SUBCUTANEOUS
Refills: 0 | Status: DISCONTINUED | OUTPATIENT
Start: 2019-07-28 | End: 2019-07-30

## 2019-07-28 RX ADMIN — PIPERACILLIN AND TAZOBACTAM 25 GRAM(S): 4; .5 INJECTION, POWDER, LYOPHILIZED, FOR SOLUTION INTRAVENOUS at 23:20

## 2019-07-28 RX ADMIN — ONDANSETRON 4 MILLIGRAM(S): 8 TABLET, FILM COATED ORAL at 23:18

## 2019-07-28 RX ADMIN — PIPERACILLIN AND TAZOBACTAM 25 GRAM(S): 4; .5 INJECTION, POWDER, LYOPHILIZED, FOR SOLUTION INTRAVENOUS at 06:27

## 2019-07-28 RX ADMIN — Medication 100 MILLIEQUIVALENT(S): at 23:20

## 2019-07-28 RX ADMIN — Medication 100 MILLIEQUIVALENT(S): at 16:30

## 2019-07-28 RX ADMIN — SODIUM CHLORIDE 100 MILLILITER(S): 9 INJECTION INTRAMUSCULAR; INTRAVENOUS; SUBCUTANEOUS at 15:10

## 2019-07-28 RX ADMIN — Medication 40 MILLIEQUIVALENT(S): at 13:06

## 2019-07-28 RX ADMIN — Medication 100 MILLIEQUIVALENT(S): at 13:09

## 2019-07-28 RX ADMIN — ONDANSETRON 4 MILLIGRAM(S): 8 TABLET, FILM COATED ORAL at 10:27

## 2019-07-28 RX ADMIN — Medication 100 MILLIEQUIVALENT(S): at 15:06

## 2019-07-28 RX ADMIN — Medication 100 MILLIEQUIVALENT(S): at 21:31

## 2019-07-28 RX ADMIN — Medication 1 MILLIGRAM(S): at 11:36

## 2019-07-28 RX ADMIN — PIPERACILLIN AND TAZOBACTAM 25 GRAM(S): 4; .5 INJECTION, POWDER, LYOPHILIZED, FOR SOLUTION INTRAVENOUS at 17:29

## 2019-07-28 NOTE — PROGRESS NOTE ADULT - ASSESSMENT
38F hx hereditary spherocytosis admitted for imaging showing Mirizzi syndrome and pancreatitis     - ERCP per GI, they are planning for Monday tentatively  - Pain control  - Zosyn  - Trend T bili, monitor vitals  - Will follow for possible same admission lap quincy, pending ERCP results  - Rest of care per primary, please call w/ questions    DAVID Allen, PGY-2  Acute Care Surgery (B Team)  [id number] with any questions

## 2019-07-28 NOTE — PROGRESS NOTE ADULT - PROBLEM SELECTOR PLAN 2
- Meets infectious workup for leukocytosis 21.39, tachycardic to the 130's with source presumed to be intraabdominal given her complaints and physical examination  - c/w zosyn for anaerobic and gram neg coverage  - blood cultures with 1 bottle growing gram + rods with neg PCR  - Repeat BCx pending  - Continue IVF LR 100cc/ hour - Meets infectious workup for leukocytosis 21.39, tachycardic to the 130's with source presumed to be intraabdominal given her complaints and physical examination  - c/w zosyn for anaerobic and gram neg coverage (7/26 -   - blood cultures with 1 bottle growing gram + rods with neg PCR  - Repeat BCx pending  - Continue IVF LR 100cc/ hour - Meets infectious workup for leukocytosis 21.39, tachycardic to the 130's with source presumed to be intraabdominal given her complaints and physical examination  - c/w zosyn for anaerobic and gram neg coverage (7/26 -   - blood cultures with 1 bottle growing gram + rods with neg PCR  - Repeat BCx NGTD  - Continue IVF LR 100cc/ hour - Meets infectious workup for leukocytosis 21.39, tachycardic to the 130's with source presumed to be intraabdominal given her complaints and physical examination  - c/w zosyn for anaerobic and gram neg coverage (7/26 -   - blood cultures with 1 bottle growing gram + rods with neg PCR, likely contaminant   - Repeat BCx NGTD  - Continue IVF LR 100cc/ hour

## 2019-07-28 NOTE — PROVIDER CONTACT NOTE (OTHER) - REASON
pt tachycardia 113
pt tachycardia a110
pt tachycardia and febrile 100.6
pt tachycardia a107
Pt has low grade temp..
positive blood culture.

## 2019-07-28 NOTE — PROGRESS NOTE ADULT - PROBLEM SELECTOR PLAN 8
DVT PPX: improve score of .4 will encourage ambulation   Diet: NPO, ice chips and meds okay  Dispo: likely home no needs DVT PPX: improve score of .4 will encourage ambulation   Diet: clear liquid. NPO after midnight.   Dispo: likely home no needs

## 2019-07-28 NOTE — PROGRESS NOTE ADULT - ASSESSMENT
38F PMHx hereditary spherocytosis (diagnosed at [facility] 26 years ago), who presents with abdominal pain, hyperbilirubinemia admitted for sepsis d/t acute pancreatitis and acute cholecystis in the setting of gallstones 2/2 to chronic hemolysis from hereditary spherocytosis.

## 2019-07-28 NOTE — PROVIDER CONTACT NOTE (OTHER) - DATE AND TIME:
26-Jul-2019 18:26
26-Jul-2019 22:00
27-Jul-2019 15:00
27-Jul-2019 05:00
27-Jul-2019 21:55
27-Jul-2019 04:32

## 2019-07-28 NOTE — PROGRESS NOTE ADULT - SUBJECTIVE AND OBJECTIVE BOX
Patient is a 38y old  Female who presents with a chief complaint of Hyperbilirubinemia (27 Jul 2019 18:38)      SUBJECTIVE / OVERNIGHT EVENTS:  Patient seen and examined at bedside.    Other Review of Systems Negative.    MEDICATIONS  (STANDING):  folic acid 1 milliGRAM(s) Oral daily  piperacillin/tazobactam IVPB.. 3.375 Gram(s) IV Intermittent every 8 hours    MEDICATIONS  (PRN):  HYDROmorphone  Injectable 0.5 milliGRAM(s) IV Push every 4 hours PRN Severe Pain (7 - 10)      OBJECTIVE:    Vital Signs Last 24 Hrs  T(C): 36.8 (28 Jul 2019 01:23), Max: 37.8 (27 Jul 2019 21:26)  T(F): 98.3 (28 Jul 2019 01:23), Max: 100 (27 Jul 2019 21:26)  HR: 106 (28 Jul 2019 01:23) (100 - 113)  BP: 104/59 (28 Jul 2019 01:23) (101/59 - 121/70)  BP(mean): --  RR: 17 (28 Jul 2019 01:23) (16 - 18)  SpO2: 97% (28 Jul 2019 01:23) (96% - 100%)    CAPILLARY BLOOD GLUCOSE        I&O's Summary    26 Jul 2019 07:01  -  27 Jul 2019 07:00  --------------------------------------------------------  IN: 1260 mL / OUT: 1300 mL / NET: -40 mL        PHYSICAL EXAM:  GENERAL: NAD, well-developed  HEAD:  Atraumatic, Normocephalic  EYES: EOMI, PERRLA, conjunctiva and sclera clear  NECK: Supple, No JVD  CHEST/LUNG: Clear to auscultation bilaterally; No wheeze  HEART: Regular rate and rhythm; No murmurs, rubs, or gallops  ABDOMEN: Soft, Nontender, Nondistended; Bowel sounds present  EXTREMITIES:  2+ Peripheral Pulses, No clubbing, cyanosis, or edema  PSYCH: AAOx3  NEUROLOGY: non-focal  SKIN: No rashes or lesions    LABS:                        7.7    13.97 )-----------( 155      ( 27 Jul 2019 15:25 )             23.4     Auto Eosinophil # x     / Auto Eosinophil % x     / Auto Neutrophil # x     / Auto Neutrophil % x     / BANDS % x                            7.4    13.23 )-----------( 157      ( 27 Jul 2019 07:51 )             22.6     Auto Eosinophil # x     / Auto Eosinophil % x     / Auto Neutrophil # x     / Auto Neutrophil % x     / BANDS % x                            6.9    13.71 )-----------( 159      ( 26 Jul 2019 11:50 )             21.6     Auto Eosinophil # x     / Auto Eosinophil % x     / Auto Neutrophil # x     / Auto Neutrophil % x     / BANDS % x        07-27    140  |  105  |  10  ----------------------------<  96  3.2<L>   |  23  |  0.44<L>  07-26    146<H>  |  111<H>  |  18  ----------------------------<  96  3.1<L>   |  24  |  0.54    Ca    8.9      27 Jul 2019 07:51  Mg     1.8     07-27  Phos  2.4     07-27  TPro  5.8<L>  /  Alb  3.7  /  TBili  33.2<H>  /  DBili  x   /  AST  21  /  ALT  59<H>  /  AlkPhos  61  07-27  TPro  5.8<L>  /  Alb  4.0  /  TBili  34.1<H>  /  DBili  13.4<H>  /  AST  47<H>  /  ALT  93<H>  /  AlkPhos  67  07-26    PT/INR - ( 26 Jul 2019 11:50 )   PT: 16.3 SEC;   INR: 1.45          PTT - ( 26 Jul 2019 11:50 )  PTT:26.9 SEC        Lactate, Blood: 0.7 mmol/L (07-26 @ 11:50)        ABG:     VBG:       Care Discussed with Consultants/Other Providers:    RADIOLOGY & ADDITIONAL TESTS:  (Imaging Personally Reviewed) Patient is a 38y old  Female who presents with a chief complaint of Hyperbilirubinemia (27 Jul 2019 18:38)      SUBJECTIVE / OVERNIGHT EVENTS:  Patient seen and examined at bedside. Fever 100.0 overnight, responded with Tylenol. Nausea, responded with zofran.     Other Review of Systems Negative.    MEDICATIONS  (STANDING):  folic acid 1 milliGRAM(s) Oral daily  piperacillin/tazobactam IVPB.. 3.375 Gram(s) IV Intermittent every 8 hours    MEDICATIONS  (PRN):  HYDROmorphone  Injectable 0.5 milliGRAM(s) IV Push every 4 hours PRN Severe Pain (7 - 10)      OBJECTIVE:    Vital Signs Last 24 Hrs  T(C): 36.8 (28 Jul 2019 01:23), Max: 37.8 (27 Jul 2019 21:26)  T(F): 98.3 (28 Jul 2019 01:23), Max: 100 (27 Jul 2019 21:26)  HR: 106 (28 Jul 2019 01:23) (100 - 113)  BP: 104/59 (28 Jul 2019 01:23) (101/59 - 121/70)  BP(mean): --  RR: 17 (28 Jul 2019 01:23) (16 - 18)  SpO2: 97% (28 Jul 2019 01:23) (96% - 100%)    CAPILLARY BLOOD GLUCOSE        I&O's Summary    26 Jul 2019 07:01  -  27 Jul 2019 07:00  --------------------------------------------------------  IN: 1260 mL / OUT: 1300 mL / NET: -40 mL        PHYSICAL EXAM:  GENERAL: NAD, well-developed  HEAD:  Atraumatic, Normocephalic  EYES: EOMI, PERRLA, conjunctiva and sclera clear  NECK: Supple, No JVD  CHEST/LUNG: Clear to auscultation bilaterally; No wheeze  HEART: Regular rate and rhythm; No murmurs, rubs, or gallops  ABDOMEN: Soft, Nontender, Nondistended; Bowel sounds present  EXTREMITIES:  2+ Peripheral Pulses, No clubbing, cyanosis, or edema  PSYCH: AAOx3  NEUROLOGY: non-focal  SKIN: No rashes or lesions    LABS:                        7.7    13.97 )-----------( 155      ( 27 Jul 2019 15:25 )             23.4     Auto Eosinophil # x     / Auto Eosinophil % x     / Auto Neutrophil # x     / Auto Neutrophil % x     / BANDS % x                            7.4    13.23 )-----------( 157      ( 27 Jul 2019 07:51 )             22.6     Auto Eosinophil # x     / Auto Eosinophil % x     / Auto Neutrophil # x     / Auto Neutrophil % x     / BANDS % x                            6.9    13.71 )-----------( 159      ( 26 Jul 2019 11:50 )             21.6     Auto Eosinophil # x     / Auto Eosinophil % x     / Auto Neutrophil # x     / Auto Neutrophil % x     / BANDS % x        07-27    140  |  105  |  10  ----------------------------<  96  3.2<L>   |  23  |  0.44<L>  07-26    146<H>  |  111<H>  |  18  ----------------------------<  96  3.1<L>   |  24  |  0.54    Ca    8.9      27 Jul 2019 07:51  Mg     1.8     07-27  Phos  2.4     07-27  TPro  5.8<L>  /  Alb  3.7  /  TBili  33.2<H>  /  DBili  x   /  AST  21  /  ALT  59<H>  /  AlkPhos  61  07-27  TPro  5.8<L>  /  Alb  4.0  /  TBili  34.1<H>  /  DBili  13.4<H>  /  AST  47<H>  /  ALT  93<H>  /  AlkPhos  67  07-26    PT/INR - ( 26 Jul 2019 11:50 )   PT: 16.3 SEC;   INR: 1.45          PTT - ( 26 Jul 2019 11:50 )  PTT:26.9 SEC        Lactate, Blood: 0.7 mmol/L (07-26 @ 11:50)        ABG:     VBG:       Care Discussed with Consultants/Other Providers:    RADIOLOGY & ADDITIONAL TESTS:  (Imaging Personally Reviewed)

## 2019-07-28 NOTE — PROGRESS NOTE ADULT - PROBLEM SELECTOR PLAN 1
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 to chronic gallstones leading to biliary stasis and chronic inflammation)  -MRCP ordered r/o choledocholithiasis   -surgery recs appreciated, no plan for cholecystectomy, recommend GI evaluation for ERCP  - Gi recs appreciated, aggressive IVF, ERCP on Monday prior to cholecystectomy or sooner if indicated, doppler US to r/o hepatic vein thrombosis  - abx as below 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 to chronic gallstones leading to biliary stasis and chronic inflammation)  -MRCP ordered r/o choledocholithiasis   -surgery recs appreciated, no plan for cholecystectomy, recommend GI evaluation for ERCP, will follow for possible same admission lap quincy, pending ERCP results  - Gi recs appreciated, aggressive IVF, ERCP on Monday prior to cholecystectomy or sooner if indicated,   - doppler US to r/o hepatic vein thrombosis  - abx as below

## 2019-07-28 NOTE — PROGRESS NOTE ADULT - SUBJECTIVE AND OBJECTIVE BOX
GENERAL SURGERY DAILY PROGRESS NOTE:    Interval:  No acute events overnight.    Subjective:  Patient seen and examined. Reports pain is well controlled. Endorsing some nausea. Tolerating clears.     Vital Signs Last 24 Hrs  T(C): 37.3 (28 Jul 2019 06:25), Max: 37.8 (27 Jul 2019 21:26)  T(F): 99.1 (28 Jul 2019 06:25), Max: 100 (27 Jul 2019 21:26)  HR: 105 (28 Jul 2019 06:25) (100 - 113)  BP: 112/59 (28 Jul 2019 06:25) (101/59 - 121/70)  RR: 17 (28 Jul 2019 06:25) (17 - 18)  SpO2: 97% (28 Jul 2019 06:25) (96% - 99%)    Exam:  Gen: NAD, resting in bed, alert and responding appropriately, jaundiced eyes  Resp: Airway patent, non-labored respirations  Abd: Soft, distended, NT, no rebound or guarding  Ext: WWP      I&O's Detail    MEDICATIONS  (STANDING):  folic acid 1 milliGRAM(s) Oral daily  piperacillin/tazobactam IVPB.. 3.375 Gram(s) IV Intermittent every 8 hours    MEDICATIONS  (PRN):  HYDROmorphone  Injectable 0.5 milliGRAM(s) IV Push every 4 hours PRN Severe Pain (7 - 10)  ondansetron Injectable 4 milliGRAM(s) IV Push every 6 hours PRN Nausea and/or Vomiting      LABS:                        7.7    13.97 )-----------( 155      ( 27 Jul 2019 15:25 )             23.4     07-27    140  |  105  |  10  ----------------------------<  96  3.2<L>   |  23  |  0.44<L>    Ca    8.9      27 Jul 2019 07:51  Phos  2.4     07-27  Mg     1.8     07-27    TPro  5.8<L>  /  Alb  3.7  /  TBili  33.2<H>  /  DBili  x   /  AST  21  /  ALT  59<H>  /  AlkPhos  61  07-27    PT/INR - ( 26 Jul 2019 11:50 )   PT: 16.3 SEC;   INR: 1.45          PTT - ( 26 Jul 2019 11:50 )  PTT:26.9 SEC

## 2019-07-28 NOTE — PROGRESS NOTE ADULT - PROBLEM SELECTOR PLAN 3
- Patient with two days of periumbilical abdominal pain radiating to her back   Meets  clinical criteria for pancreatitis 2/2 due to acute epigastric pain and Lipase>600. Cause Likely 2/2 to pigmented stones, given likely hemolysis of blood cells from HS  -c/w  IVF and pain medication PRN Dilaudid .5 q 4 hours - Patient with two days of periumbilical abdominal pain radiating to her back   Meets  clinical criteria for pancreatitis 2/2 due to acute epigastric pain and Lipase>600. Cause Likely 2/2 to pigmented stones, given likely hemolysis of blood cells from HS  -c/w  IVF, Dilaudid .5 q 4 hours PRN, zofran 4 q 6 hours PRN

## 2019-07-28 NOTE — PROVIDER CONTACT NOTE (OTHER) - RECOMMENDATIONS
give Tylenol, and reassess
provider notified
give tylenol, hold blood transfusion now until further notice from MD.
provider notified

## 2019-07-28 NOTE — PROVIDER CONTACT NOTE (OTHER) - ASSESSMENT
pt alert, febrile, tachycardia, asymptomatic.
pt alert, febrile, tachycardia, appear asymptomatic.
pt alert, febrile, tachycardia, asymptomatic.
asymptomatic

## 2019-07-28 NOTE — PROVIDER CONTACT NOTE (OTHER) - ACTION/TREATMENT ORDERED:
no new orders.
Tylenol 650 ordered
no new orders.
AS per MD. will get back with treatment plan.
no new orders.
no new orders.

## 2019-07-28 NOTE — PROGRESS NOTE ADULT - PROBLEM SELECTOR PLAN 7
- Patient takes folic acid at home, will continue home meds   - Hgb 9.4 to 6.5, likely secondary to hemolysis. Anemia is normocytic  - folate normal, hapto low, and LDH elevated. Hapto and LDH 96% specific for hemolysis  - Retic Count % elevated.   - Active T&S  - 1u pRBC given--> CBC q12 - Patient takes folic acid at home, will continue home meds   - Hgb 9.4 to 6.5, likely secondary to hemolysis. Anemia is normocytic  - folate normal, hapto low, and LDH elevated. Hapto and LDH 96% specific for hemolysis  - Retic Count % elevated.   - Active T&S  - 1u pRBC given 7/26 --> CBC q12

## 2019-07-29 LAB
ALBUMIN SERPL ELPH-MCNC: 3.4 G/DL — SIGNIFICANT CHANGE UP (ref 3.3–5)
ALP SERPL-CCNC: 73 U/L — SIGNIFICANT CHANGE UP (ref 40–120)
ALT FLD-CCNC: 31 U/L — SIGNIFICANT CHANGE UP (ref 4–33)
ANION GAP SERPL CALC-SCNC: 11 MMO/L — SIGNIFICANT CHANGE UP (ref 7–14)
APTT BLD: 29.2 SEC — SIGNIFICANT CHANGE UP (ref 27.5–36.3)
AST SERPL-CCNC: 16 U/L — SIGNIFICANT CHANGE UP (ref 4–32)
BILIRUB SERPL-MCNC: 21.7 MG/DL — HIGH (ref 0.2–1.2)
BLD GP AB SCN SERPL QL: NEGATIVE — SIGNIFICANT CHANGE UP
BUN SERPL-MCNC: 6 MG/DL — LOW (ref 7–23)
CALCIUM SERPL-MCNC: 8.9 MG/DL — SIGNIFICANT CHANGE UP (ref 8.4–10.5)
CHLORIDE SERPL-SCNC: 101 MMOL/L — SIGNIFICANT CHANGE UP (ref 98–107)
CO2 SERPL-SCNC: 25 MMOL/L — SIGNIFICANT CHANGE UP (ref 22–31)
CREAT SERPL-MCNC: 0.55 MG/DL — SIGNIFICANT CHANGE UP (ref 0.5–1.3)
GLUCOSE SERPL-MCNC: 101 MG/DL — HIGH (ref 70–99)
HCG SERPL-ACNC: < 5 MIU/ML — SIGNIFICANT CHANGE UP
HCT VFR BLD CALC: 23.3 % — LOW (ref 34.5–45)
HGB BLD-MCNC: 7.5 G/DL — LOW (ref 11.5–15.5)
INR BLD: 1.28 — HIGH (ref 0.88–1.17)
MAGNESIUM SERPL-MCNC: 1.8 MG/DL — SIGNIFICANT CHANGE UP (ref 1.6–2.6)
MCHC RBC-ENTMCNC: 29 PG — SIGNIFICANT CHANGE UP (ref 27–34)
MCHC RBC-ENTMCNC: 32.2 % — SIGNIFICANT CHANGE UP (ref 32–36)
MCV RBC AUTO: 90 FL — SIGNIFICANT CHANGE UP (ref 80–100)
NRBC # FLD: 0.13 K/UL — SIGNIFICANT CHANGE UP (ref 0–0)
NRBC FLD-RTO: 1.3 — SIGNIFICANT CHANGE UP
PHOSPHATE SERPL-MCNC: 2.7 MG/DL — SIGNIFICANT CHANGE UP (ref 2.5–4.5)
PLATELET # BLD AUTO: 177 K/UL — SIGNIFICANT CHANGE UP (ref 150–400)
PMV BLD: 11.2 FL — SIGNIFICANT CHANGE UP (ref 7–13)
POTASSIUM SERPL-MCNC: 3.4 MMOL/L — LOW (ref 3.5–5.3)
POTASSIUM SERPL-SCNC: 3.4 MMOL/L — LOW (ref 3.5–5.3)
PROT SERPL-MCNC: 6.1 G/DL — SIGNIFICANT CHANGE UP (ref 6–8.3)
PROTHROM AB SERPL-ACNC: 14.3 SEC — HIGH (ref 9.8–13.1)
RBC # BLD: 2.59 M/UL — LOW (ref 3.8–5.2)
RBC # FLD: 23.7 % — HIGH (ref 10.3–14.5)
RH IG SCN BLD-IMP: POSITIVE — SIGNIFICANT CHANGE UP
SODIUM SERPL-SCNC: 137 MMOL/L — SIGNIFICANT CHANGE UP (ref 135–145)
WBC # BLD: 10.37 K/UL — SIGNIFICANT CHANGE UP (ref 3.8–10.5)
WBC # FLD AUTO: 10.37 K/UL — SIGNIFICANT CHANGE UP (ref 3.8–10.5)

## 2019-07-29 PROCEDURE — 74328 X-RAY BILE DUCT ENDOSCOPY: CPT | Mod: 26,GC

## 2019-07-29 PROCEDURE — 43274 ERCP DUCT STENT PLACEMENT: CPT | Mod: GC,59

## 2019-07-29 PROCEDURE — 43264 ERCP REMOVE DUCT CALCULI: CPT | Mod: GC

## 2019-07-29 PROCEDURE — 99222 1ST HOSP IP/OBS MODERATE 55: CPT | Mod: GC,25

## 2019-07-29 PROCEDURE — 99233 SBSQ HOSP IP/OBS HIGH 50: CPT

## 2019-07-29 PROCEDURE — 93975 VASCULAR STUDY: CPT | Mod: 26

## 2019-07-29 RX ORDER — SODIUM CHLORIDE 9 MG/ML
1000 INJECTION, SOLUTION INTRAVENOUS
Refills: 0 | Status: DISCONTINUED | OUTPATIENT
Start: 2019-07-29 | End: 2019-07-30

## 2019-07-29 RX ORDER — INDOMETHACIN 50 MG
100 CAPSULE ORAL ONCE
Refills: 0 | Status: COMPLETED | OUTPATIENT
Start: 2019-07-29 | End: 2019-07-29

## 2019-07-29 RX ORDER — POTASSIUM CHLORIDE 20 MEQ
40 PACKET (EA) ORAL ONCE
Refills: 0 | Status: DISCONTINUED | OUTPATIENT
Start: 2019-07-29 | End: 2019-07-29

## 2019-07-29 RX ORDER — POTASSIUM CHLORIDE 20 MEQ
10 PACKET (EA) ORAL
Refills: 0 | Status: COMPLETED | OUTPATIENT
Start: 2019-07-29 | End: 2019-07-29

## 2019-07-29 RX ADMIN — PIPERACILLIN AND TAZOBACTAM 25 GRAM(S): 4; .5 INJECTION, POWDER, LYOPHILIZED, FOR SOLUTION INTRAVENOUS at 14:37

## 2019-07-29 RX ADMIN — Medication 100 MILLIEQUIVALENT(S): at 10:53

## 2019-07-29 RX ADMIN — ONDANSETRON 4 MILLIGRAM(S): 8 TABLET, FILM COATED ORAL at 22:19

## 2019-07-29 RX ADMIN — PIPERACILLIN AND TAZOBACTAM 25 GRAM(S): 4; .5 INJECTION, POWDER, LYOPHILIZED, FOR SOLUTION INTRAVENOUS at 22:19

## 2019-07-29 RX ADMIN — PIPERACILLIN AND TAZOBACTAM 25 GRAM(S): 4; .5 INJECTION, POWDER, LYOPHILIZED, FOR SOLUTION INTRAVENOUS at 07:21

## 2019-07-29 RX ADMIN — Medication 100 MILLIEQUIVALENT(S): at 14:34

## 2019-07-29 RX ADMIN — HYDROMORPHONE HYDROCHLORIDE 0.5 MILLIGRAM(S): 2 INJECTION INTRAMUSCULAR; INTRAVENOUS; SUBCUTANEOUS at 10:51

## 2019-07-29 RX ADMIN — SODIUM CHLORIDE 100 MILLILITER(S): 9 INJECTION INTRAMUSCULAR; INTRAVENOUS; SUBCUTANEOUS at 10:54

## 2019-07-29 RX ADMIN — HYDROMORPHONE HYDROCHLORIDE 0.5 MILLIGRAM(S): 2 INJECTION INTRAMUSCULAR; INTRAVENOUS; SUBCUTANEOUS at 11:11

## 2019-07-29 RX ADMIN — Medication 100 MILLIGRAM(S): at 18:46

## 2019-07-29 RX ADMIN — Medication 100 MILLIEQUIVALENT(S): at 12:11

## 2019-07-29 NOTE — PROGRESS NOTE ADULT - SUBJECTIVE AND OBJECTIVE BOX
GENERAL SURGERY DAILY PROGRESS NOTE:     Subjective:  Pt seen and examined. No acute events overnight. Pain controlled. Reports some nausea, denies emesis.     Objective:    MEDICATIONS  (STANDING):  folic acid 1 milliGRAM(s) Oral daily  piperacillin/tazobactam IVPB.. 3.375 Gram(s) IV Intermittent every 8 hours  potassium chloride  10 mEq/100 mL IVPB 10 milliEquivalent(s) IV Intermittent every 1 hour  sodium chloride 0.9%. 1000 milliLiter(s) (100 mL/Hr) IV Continuous <Continuous>    MEDICATIONS  (PRN):  HYDROmorphone  Injectable 0.5 milliGRAM(s) IV Push every 4 hours PRN Severe Pain (7 - 10)  ondansetron Injectable 4 milliGRAM(s) IV Push every 6 hours PRN Nausea and/or Vomiting      Vital Signs Last 24 Hrs  T(C): 37 (29 Jul 2019 10:49), Max: 37.5 (28 Jul 2019 12:00)  T(F): 98.6 (29 Jul 2019 10:49), Max: 99.5 (28 Jul 2019 12:00)  HR: 98 (29 Jul 2019 10:49) (95 - 105)  BP: 111/62 (29 Jul 2019 10:49) (102/63 - 117/60)  BP(mean): --  RR: 16 (29 Jul 2019 10:49) (16 - 18)  SpO2: 96% (29 Jul 2019 10:49) (96% - 100%)    I&O's Detail    28 Jul 2019 07:01  -  29 Jul 2019 07:00  --------------------------------------------------------  IN:    IV PiggyBack: 300 mL    Oral Fluid: 600 mL    sodium chloride 0.9%.: 200 mL  Total IN: 1100 mL    OUT:  Total OUT: 0 mL    Total NET: 1100 mL    PHYSICAL EXAM  Gen: NAD, resting in bed, alert and responding appropriately  Resp: Airway patent, non-labored respirations  Abd: Soft, mildly distended, tender over mid abdomen and LUQ, no rebound or guarding  Ext: WWP      Daily     Daily     LABS:                        7.5    10.37 )-----------( 177      ( 29 Jul 2019 06:10 )             23.3     07-29    137  |  101  |  6<L>  ----------------------------<  101<H>  3.4<L>   |  25  |  0.55    Ca    8.9      29 Jul 2019 06:10  Phos  2.7     07-29  Mg     1.8     07-29    TPro  6.1  /  Alb  3.4  /  TBili  21.7<H>  /  DBili  x   /  AST  16  /  ALT  31  /  AlkPhos  73  07-29    PT/INR - ( 29 Jul 2019 06:10 )   PT: 14.3 SEC;   INR: 1.28          PTT - ( 29 Jul 2019 06:10 )  PTT:29.2 SEC      RADIOLOGY & ADDITIONAL STUDIES:

## 2019-07-29 NOTE — PROGRESS NOTE ADULT - PROBLEM SELECTOR PLAN 8
DVT PPX: improve score of .4 will encourage ambulation   Diet: NPO   Dispo: likely home no needs DVT PPX: improve score of .4 will encourage ambulation   Diet: NPO   Dispo: likely home, no needs

## 2019-07-29 NOTE — PROGRESS NOTE ADULT - PROBLEM SELECTOR PLAN 2
- Meets infectious workup for leukocytosis 21.39, tachycardic to the 130's with source presumed to be intraabdominal given her complaints and physical examination  - c/w zosyn for anaerobic and gram neg coverage (7/26 ->   - blood cultures with 1 bottle growing gram + rods with neg PCR, likely contaminant   - Repeat BCx NGTD  - Continue IVF LR 100cc/ hour

## 2019-07-29 NOTE — PROGRESS NOTE ADULT - PROBLEM SELECTOR PLAN 1
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 to chronic gallstones leading to biliary stasis and chronic inflammation)  -surgery recs appreciated, no plan for cholecystectomy, recommend GI evaluation for ERCP, will follow for possible same admission lap quincy, pending ERCP results  - Gi recs appreciated, aggressive IVF, ERCP on Monday prior to cholecystectomy or sooner if indicated  - doppler US to r/o hepatic vein thrombosis  - abx as below 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 to chronic gallstones leading to biliary stasis and chronic inflammation)  -surgery recs appreciated, will follow for possible same admission lap quincy, pending ERCP results  - Gi recs appreciated, aggressive IVF, ERCP on Monday prior to cholecystectomy or sooner if indicated  - doppler US to r/o hepatic vein thrombosis  - abx as below 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

## 2019-07-29 NOTE — PROGRESS NOTE ADULT - PROBLEM SELECTOR PLAN 3
- Patient with two days of periumbilical abdominal pain radiating to her back   Meets  clinical criteria for pancreatitis 2/2 due to acute epigastric pain and Lipase>600. Cause Likely 2/2 to pigmented stones, given likely hemolysis of blood cells from HS  -c/w  IVF, Dilaudid .5 q 4 hours PRN, zofran 4 q 6 hours PRN

## 2019-07-29 NOTE — PROGRESS NOTE ADULT - SUBJECTIVE AND OBJECTIVE BOX
Luna Mast, PGY1  J [id number]  [phone]    Patient is a 38y old  Female who presents with a chief complaint of Hyperbilirubinemia (28 Jul 2019 10:02)    SUBJECTIVE / OVERNIGHT EVENTS:  Patient seen and examined at bedside.    Other Review of Systems Negative.    MEDICATIONS  (STANDING):  folic acid 1 milliGRAM(s) Oral daily  piperacillin/tazobactam IVPB.. 3.375 Gram(s) IV Intermittent every 8 hours  sodium chloride 0.9%. 1000 milliLiter(s) (100 mL/Hr) IV Continuous <Continuous>    MEDICATIONS  (PRN):  HYDROmorphone  Injectable 0.5 milliGRAM(s) IV Push every 4 hours PRN Severe Pain (7 - 10)  ondansetron Injectable 4 milliGRAM(s) IV Push every 6 hours PRN Nausea and/or Vomiting      OBJECTIVE:  Vital Signs Last 24 Hrs  T(C): 36.8 (29 Jul 2019 01:14), Max: 37.5 (28 Jul 2019 12:00)  T(F): 98.3 (29 Jul 2019 01:14), Max: 99.5 (28 Jul 2019 12:00)  HR: 102 (29 Jul 2019 01:14) (99 - 105)  BP: 111/58 (29 Jul 2019 01:14) (102/63 - 117/60)  BP(mean): --  RR: 18 (29 Jul 2019 01:14) (17 - 18)  SpO2: 96% (29 Jul 2019 01:14) (96% - 100%)    I&O's Summary  28 Jul 2019 07:01  -  29 Jul 2019 06:05  --------------------------------------------------------  IN: 1100 mL / OUT: 0 mL / NET: 1100 mL    PHYSICAL EXAM:  GENERAL: NAD, well-developed, laying in bed, speaking in full sentences  EYES: EOMI, PERRLA, scleral icterus  NECK: Supple, No JVD.   CHEST/LUNG: Clear to auscultation bilaterally; No wheezes  HEART: Regular rate and rhythm; No murmurs, rubs, or gallops  ABDOMEN: Soft, slightly distended, tender to palpation in left upper quadrant, + BS  EXTREMITIES:  2+ Peripheral Pulses, No clubbing, cyanosis, or edema  PSYCH: AAOx3  NEUROLOGY: non-focal  SKIN: No rashes or lesions    LABS:                        8.0    11.52 )-----------( 166      ( 28 Jul 2019 10:35 )             23.7     Auto Eosinophil # x     / Auto Eosinophil % x     / Auto Neutrophil # x     / Auto Neutrophil % x     / BANDS % x                            7.7    13.97 )-----------( 155      ( 27 Jul 2019 15:25 )             23.4     Auto Eosinophil # x     / Auto Eosinophil % x     / Auto Neutrophil # x     / Auto Neutrophil % x     / BANDS % x                            7.4    13.23 )-----------( 157      ( 27 Jul 2019 07:51 )             22.6     Auto Eosinophil # x     / Auto Eosinophil % x     / Auto Neutrophil # x     / Auto Neutrophil % x     / BANDS % x        07-28    138  |  99  |  7   ----------------------------<  112<H>  3.0<L>   |  25  |  0.45<L>  07-27    140  |  105  |  10  ----------------------------<  96  3.2<L>   |  23  |  0.44<L>    Ca    8.9      28 Jul 2019 10:35  Mg     1.8     07-27  Phos  2.4     07-27  TPro  6.2  /  Alb  3.8  /  TBili  29.3<H>  /  DBili  x   /  AST  14  /  ALT  40<H>  /  AlkPhos  67  07-28  TPro  5.8<L>  /  Alb  3.7  /  TBili  33.2<H>  /  DBili  x   /  AST  21  /  ALT  59<H>  /  AlkPhos  61  07-27    Lactate, Blood: 0.7 mmol/L (07-26 @ 11:50)    Care Discussed with Consultants/Other Providers:    RADIOLOGY & ADDITIONAL TESTS:  (Imaging Personally Reviewed) Luna Mast, PGY1  J [id number]  [phone]    Patient is a 38y old  Female who presents with a chief complaint of Hyperbilirubinemia (28 Jul 2019 10:02)    SUBJECTIVE / OVERNIGHT EVENTS:  Patient seen and examined at bedside. REGINA. Pt reports minimal abdominal pain, minimal bloating and minimal nausea. No fever, vomiting, diarrhea, no constipation.     Other Review of Systems Negative.    MEDICATIONS  (STANDING):  folic acid 1 milliGRAM(s) Oral daily  piperacillin/tazobactam IVPB.. 3.375 Gram(s) IV Intermittent every 8 hours  sodium chloride 0.9%. 1000 milliLiter(s) (100 mL/Hr) IV Continuous <Continuous>    MEDICATIONS  (PRN):  HYDROmorphone  Injectable 0.5 milliGRAM(s) IV Push every 4 hours PRN Severe Pain (7 - 10)  ondansetron Injectable 4 milliGRAM(s) IV Push every 6 hours PRN Nausea and/or Vomiting    OBJECTIVE:  Vital Signs Last 24 Hrs  T(C): 36.8 (29 Jul 2019 01:14), Max: 37.5 (28 Jul 2019 12:00)  T(F): 98.3 (29 Jul 2019 01:14), Max: 99.5 (28 Jul 2019 12:00)  HR: 102 (29 Jul 2019 01:14) (99 - 105)  BP: 111/58 (29 Jul 2019 01:14) (102/63 - 117/60)  BP(mean): --  RR: 18 (29 Jul 2019 01:14) (17 - 18)  SpO2: 96% (29 Jul 2019 01:14) (96% - 100%)    I&O's Summary  28 Jul 2019 07:01  -  29 Jul 2019 06:05  --------------------------------------------------------  IN: 1100 mL / OUT: 0 mL / NET: 1100 mL    PHYSICAL EXAM:  GENERAL: NAD, well-developed, laying in bed, speaking in full sentences  EENT: EOMI, PERRLA, scleral icterus, sublingual icterus  NECK: Supple, No JVD.   CHEST/LUNG: Clear to auscultation bilaterally; No wheezes  HEART: Tachycardic, Regular rhythm; No murmurs, rubs, or gallops  ABDOMEN: Soft, slightly distended, tender to palpation throughout, + BS, + rebound tenderness  EXTREMITIES: 2+ Peripheral Pulses, No clubbing, cyanosis, or edema  PSYCH: AAOx3  NEUROLOGY: non-focal  SKIN: No rashes or lesions    LABS:                        8.0    11.52 )-----------( 166      ( 28 Jul 2019 10:35 )             23.7     Auto Eosinophil # x     / Auto Eosinophil % x     / Auto Neutrophil # x     / Auto Neutrophil % x     / BANDS % x                            7.7    13.97 )-----------( 155      ( 27 Jul 2019 15:25 )             23.4     Auto Eosinophil # x     / Auto Eosinophil % x     / Auto Neutrophil # x     / Auto Neutrophil % x     / BANDS % x                            7.4    13.23 )-----------( 157      ( 27 Jul 2019 07:51 )             22.6     Auto Eosinophil # x     / Auto Eosinophil % x     / Auto Neutrophil # x     / Auto Neutrophil % x     / BANDS % x        07-28    138  |  99  |  7   ----------------------------<  112<H>  3.0<L>   |  25  |  0.45<L>  07-27    140  |  105  |  10  ----------------------------<  96  3.2<L>   |  23  |  0.44<L>    Ca    8.9      28 Jul 2019 10:35  Mg     1.8     07-27  Phos  2.4     07-27  TPro  6.2  /  Alb  3.8  /  TBili  29.3<H>  /  DBili  x   /  AST  14  /  ALT  40<H>  /  AlkPhos  67  07-28  TPro  5.8<L>  /  Alb  3.7  /  TBili  33.2<H>  /  DBili  x   /  AST  21  /  ALT  59<H>  /  AlkPhos  61  07-27    Lactate, Blood: 0.7 mmol/L (07-26 @ 11:50)    Care Discussed with Consultants/Other Providers:    RADIOLOGY & ADDITIONAL TESTS:  (Imaging Personally Reviewed)

## 2019-07-29 NOTE — PROGRESS NOTE ADULT - PROBLEM SELECTOR PLAN 7
- c/w home folic acid  - Hgb 6.5->8, likely secondary to hemolysis. Anemia is normocytic  - folate normal, hapto low, and LDH elevated. Hapto and LDH 96% specific for hemolysis, Retic Count % elevated.   - Active T&S  - 1u pRBC given 7/26

## 2019-07-29 NOTE — PROGRESS NOTE ADULT - PROBLEM SELECTOR PLAN 5
- Bilirubin 39.1, likely 2/2 to hemolysis given underlying HS and acute quincy   - c/t trend (39--> 29.3); last labs from 2015 at which time bili= 24 - Bilirubin 39.1, likely 2/2 to hemolysis given underlying HS and acute quincy  - Resolving to baseline  - c/t trend (39--> 29.3); last labs from 2015 at which time bili= 24

## 2019-07-29 NOTE — PROGRESS NOTE ADULT - ASSESSMENT
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 38F PMHx hereditary spherocytosis (diagnosed at [facility] 26 years ago), who presents with abdominal pain, hyperbilirubinemia admitted for sepsis d/t acute pancreatitis and acute cholecystis in the setting of gallstones 2/2 to chronic hemolysis from hereditary spherocytosis. Currently awaiting ERCP on 7/29 and possible same day cholecystectomy.

## 2019-07-29 NOTE — PROGRESS NOTE ADULT - ASSESSMENT
38F hx hereditary spherocytosis admitted for imaging showing Mirizzi syndrome and pancreatitis     - ERCP today  - Pain control  - Zosyn  - Trend T bili, monitor vitals  - Will follow for possible same admission lap quincy, pending ERCP results  - Rest of care per primary, please call w/ questions    B Surgery  [id number] with any questions

## 2019-07-29 NOTE — PROGRESS NOTE ADULT - PROBLEM SELECTOR PLAN 4
- acute on chronic hemolysis likely in setting of acute infection  - s/p 1 unit PRBC   - will check CBC q24 hours and transfuse as needed  - elevated bili in setting of hemolysis and acute quincy

## 2019-07-30 LAB
ANION GAP SERPL CALC-SCNC: 12 MMO/L — SIGNIFICANT CHANGE UP (ref 7–14)
BUN SERPL-MCNC: 8 MG/DL — SIGNIFICANT CHANGE UP (ref 7–23)
CALCIUM SERPL-MCNC: 9 MG/DL — SIGNIFICANT CHANGE UP (ref 8.4–10.5)
CHLORIDE SERPL-SCNC: 100 MMOL/L — SIGNIFICANT CHANGE UP (ref 98–107)
CO2 SERPL-SCNC: 25 MMOL/L — SIGNIFICANT CHANGE UP (ref 22–31)
CREAT SERPL-MCNC: 0.52 MG/DL — SIGNIFICANT CHANGE UP (ref 0.5–1.3)
GLUCOSE SERPL-MCNC: 97 MG/DL — SIGNIFICANT CHANGE UP (ref 70–99)
HCT VFR BLD CALC: 23 % — LOW (ref 34.5–45)
HGB BLD-MCNC: 7.5 G/DL — LOW (ref 11.5–15.5)
MAGNESIUM SERPL-MCNC: 1.8 MG/DL — SIGNIFICANT CHANGE UP (ref 1.6–2.6)
MCHC RBC-ENTMCNC: 27.7 PG — SIGNIFICANT CHANGE UP (ref 27–34)
MCHC RBC-ENTMCNC: 32.6 % — SIGNIFICANT CHANGE UP (ref 32–36)
MCV RBC AUTO: 84.9 FL — SIGNIFICANT CHANGE UP (ref 80–100)
NRBC # FLD: 0.19 K/UL — SIGNIFICANT CHANGE UP (ref 0–0)
NRBC FLD-RTO: 1.9 — SIGNIFICANT CHANGE UP
PHOSPHATE SERPL-MCNC: 3.9 MG/DL — SIGNIFICANT CHANGE UP (ref 2.5–4.5)
PLATELET # BLD AUTO: 203 K/UL — SIGNIFICANT CHANGE UP (ref 150–400)
PMV BLD: 10.7 FL — SIGNIFICANT CHANGE UP (ref 7–13)
POTASSIUM SERPL-MCNC: 3.5 MMOL/L — SIGNIFICANT CHANGE UP (ref 3.5–5.3)
POTASSIUM SERPL-SCNC: 3.5 MMOL/L — SIGNIFICANT CHANGE UP (ref 3.5–5.3)
RBC # BLD: 2.71 M/UL — LOW (ref 3.8–5.2)
RBC # FLD: 22.5 % — HIGH (ref 10.3–14.5)
SODIUM SERPL-SCNC: 137 MMOL/L — SIGNIFICANT CHANGE UP (ref 135–145)
WBC # BLD: 9.98 K/UL — SIGNIFICANT CHANGE UP (ref 3.8–10.5)
WBC # FLD AUTO: 9.98 K/UL — SIGNIFICANT CHANGE UP (ref 3.8–10.5)

## 2019-07-30 PROCEDURE — 99233 SBSQ HOSP IP/OBS HIGH 50: CPT

## 2019-07-30 PROCEDURE — 99232 SBSQ HOSP IP/OBS MODERATE 35: CPT | Mod: GC

## 2019-07-30 RX ORDER — SODIUM CHLORIDE 9 MG/ML
1000 INJECTION, SOLUTION INTRAVENOUS
Refills: 0 | Status: DISCONTINUED | OUTPATIENT
Start: 2019-07-30 | End: 2019-07-30

## 2019-07-30 RX ORDER — SODIUM CHLORIDE 9 MG/ML
1000 INJECTION, SOLUTION INTRAVENOUS
Refills: 0 | Status: COMPLETED | OUTPATIENT
Start: 2019-07-30 | End: 2019-07-31

## 2019-07-30 RX ADMIN — Medication 1 MILLIGRAM(S): at 12:30

## 2019-07-30 RX ADMIN — SODIUM CHLORIDE 200 MILLILITER(S): 9 INJECTION, SOLUTION INTRAVENOUS at 14:44

## 2019-07-30 RX ADMIN — PIPERACILLIN AND TAZOBACTAM 25 GRAM(S): 4; .5 INJECTION, POWDER, LYOPHILIZED, FOR SOLUTION INTRAVENOUS at 14:44

## 2019-07-30 RX ADMIN — ONDANSETRON 4 MILLIGRAM(S): 8 TABLET, FILM COATED ORAL at 14:50

## 2019-07-30 RX ADMIN — ONDANSETRON 4 MILLIGRAM(S): 8 TABLET, FILM COATED ORAL at 21:41

## 2019-07-30 RX ADMIN — PIPERACILLIN AND TAZOBACTAM 25 GRAM(S): 4; .5 INJECTION, POWDER, LYOPHILIZED, FOR SOLUTION INTRAVENOUS at 05:49

## 2019-07-30 RX ADMIN — ONDANSETRON 4 MILLIGRAM(S): 8 TABLET, FILM COATED ORAL at 06:43

## 2019-07-30 RX ADMIN — PIPERACILLIN AND TAZOBACTAM 25 GRAM(S): 4; .5 INJECTION, POWDER, LYOPHILIZED, FOR SOLUTION INTRAVENOUS at 21:41

## 2019-07-30 NOTE — PROGRESS NOTE ADULT - SUBJECTIVE AND OBJECTIVE BOX
GENERAL SURGERY DAILY PROGRESS NOTE:     Subjective:  Pt seen and examined. No acute events overnight. s/p ERCP yesterday full report listed below. This am reports continued abdominal pain.     Objective:    MEDICATIONS  (STANDING):  folic acid 1 milliGRAM(s) Oral daily  lactated ringers. 1000 milliLiter(s) (999 mL/Hr) IV Continuous <Continuous>  piperacillin/tazobactam IVPB.. 3.375 Gram(s) IV Intermittent every 8 hours  sodium chloride 0.9%. 1000 milliLiter(s) (100 mL/Hr) IV Continuous <Continuous>    MEDICATIONS  (PRN):  HYDROmorphone  Injectable 0.5 milliGRAM(s) IV Push every 4 hours PRN Severe Pain (7 - 10)  ondansetron Injectable 4 milliGRAM(s) IV Push every 6 hours PRN Nausea and/or Vomiting      Vital Signs Last 24 Hrs  T(C): 36.7 (30 Jul 2019 05:47), Max: 37 (29 Jul 2019 10:49)  T(F): 98.1 (30 Jul 2019 05:47), Max: 98.6 (29 Jul 2019 10:49)  HR: 85 (30 Jul 2019 05:47) (85 - 107)  BP: 108/62 (30 Jul 2019 05:47) (107/61 - 127/62)  BP(mean): --  RR: 16 (30 Jul 2019 05:47) (16 - 18)  SpO2: 95% (30 Jul 2019 05:47) (95% - 99%)    I&O's Detail    PHYSICAL EXAM  NAD, awake and alert  Respirations nonlabored  Abdomen soft, tender across abdomen, nondistended  No guarding or rebound tenderness      Daily     Daily     LABS:                        7.5    9.98  )-----------( 203      ( 30 Jul 2019 06:38 )             23.0     07-30    137  |  100  |  8   ----------------------------<  97  3.5   |  25  |  0.52    Ca    9.0      30 Jul 2019 06:38  Phos  3.9     07-30  Mg     1.8     07-30    TPro  6.1  /  Alb  3.4  /  TBili  21.7<H>  /  DBili  x   /  AST  16  /  ALT  31  /  AlkPhos  73  07-29    PT/INR - ( 29 Jul 2019 06:10 )   PT: 14.3 SEC;   INR: 1.28          PTT - ( 29 Jul 2019 06:10 )  PTT:29.2 SEC      RADIOLOGY & ADDITIONAL STUDIES:      < from: ERCP (07.29.19 @ 17:13) >       The duodenoscope was passed into D2. The ampulla was identified and the        bile duct was readily cannulated. A cholangiogram was performed which        showed a CHD stricture. The biliary tree was swept with an 11.5 mm        balloon starting at the bifurcation. A few stones were removed. No        stones remained. One 10 Fr by9 cm plastic stent with a single external        flap and a single internal flap was placed into the common hepatic duct.        Bile flowed through the stent. The stent was in good position.                         Impression:          - Biliary access was obtained. A cholangiogram was                        suggestive of a mild CHD stricture. Sweeps with a                        balloon produced multiple small black stones.          - A stent was placed into the CHD. 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 The                        stricture is mild and there is no obvious                        cholecystobiliary fistula.  Recommendation:      - [facility] to [facility] care.                       - Recommend cholecystectomy.                                                                                     < end of copied text >

## 2019-07-30 NOTE — PROGRESS NOTE ADULT - PROBLEM SELECTOR PLAN 1
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

## 2019-07-30 NOTE — PROGRESS NOTE ADULT - SUBJECTIVE AND OBJECTIVE BOX
Chief Complaint:  Patient is a 38y old  Female who presents with a chief complaint of Hyperbilirubinemia (2019 05:53)      Interval Events:   s/p ERCP with spincterotomy, balloon sweep with removal of multipel stones. Found to have stricturing of the common hepatic duct without fistulization consistent with Mirizzi's syndrome s/p stent placement with good bile flow.    Allergies:  No Known Allergies      Home Medications:    Hospital Medications:  folic acid 1 milliGRAM(s) Oral daily  HYDROmorphone  Injectable 0.5 milliGRAM(s) IV Push every 4 hours PRN  lactated ringers. 1000 milliLiter(s) IV Continuous <Continuous>  ondansetron Injectable 4 milliGRAM(s) IV Push every 6 hours PRN  piperacillin/tazobactam IVPB.. 3.375 Gram(s) IV Intermittent every 8 hours  sodium chloride 0.9%. 1000 milliLiter(s) IV Continuous <Continuous>      PMHX/PSHX:  IUP (intrauterine pregnancy), incidental  Mucous polyp of cervix  Hereditary spherocytosis   history      Family history:  Family history of hypertension  Family history of hereditary spherocytosis      ROS:     General:  No wt loss, fevers, chills, night sweats, fatigue,   Eyes:  Good vision, no reported pain  ENT:  No sore throat, pain, runny nose, dysphagia  CV:  No pain, palpitations, hypo/hypertension  Resp:  No dyspnea, cough, tachypnea, wheezing  GI:  No pain, No nausea, No vomiting, No diarrhea, No constipation, No weight loss, No fever, No pruritis, No rectal bleeding, No tarry stools, No dysphagia,  :  No pain, bleeding, incontinence, nocturia  Muscle:  No pain, weakness  Neuro:  No weakness, tingling, memory problems  Psych:  No fatigue, insomnia, mood problems, depression  Endocrine:  No polyuria, polydipsia, cold/heat intolerance  Heme:  No petechiae, ecchymosis, easy bruisability  Skin:  No rash, tattoos, scars, edema      PHYSICAL EXAM:   Vital Signs:  Vital Signs Last 24 Hrs  T(C): 36.7 (2019 05:47), Max: 37 (2019 10:49)  T(F): 98.1 (2019 05:47), Max: 98.6 (2019 10:49)  HR: 85 (2019 05:47) (85 - 107)  BP: 108/62 (2019 05:47) (107/61 - 127/62)  BP(mean): --  RR: 16 (2019 05:47) (16 - 18)  SpO2: 95% (2019 05:47) (95% - 99%)  Daily     Daily     GENERAL:  NAD  HEENT:   sclera icteric  CHEST:  Full & symmetric excursion, no increased effort  HEART:  Regular rhythm, S1, S2  ABDOMEN:  soft, mildly tender in RUQ and epigastric region  EXTEREMITIES:  no cyanosis,clubbing or edema  SKIN:  +jaundice  NEURO:  Alert, oriented, no asterixis, no tremor, no encephalopathy    LABS:                        7.5    10.37 )-----------( 177      ( 2019 06:10 )             23.3     07-    137  |  101  |  6<L>  ----------------------------<  101<H>  3.4<L>   |  25  |  0.55    Ca    8.9      2019 06:10  Phos  2.7     -  Mg     1.8     -    TPro  6.1  /  Alb  3.4  /  TBili  21.7<H>  /  DBili  x   /  AST  16  /  ALT  31  /  AlkPhos  73  07-29    LIVER FUNCTIONS - ( 2019 06:10 )  Alb: 3.4 g/dL / Pro: 6.1 g/dL / ALK PHOS: 73 u/L / ALT: 31 u/L / AST: 16 u/L / GGT: x           PT/INR - ( 2019 06:10 )   PT: 14.3 SEC;   INR: 1.28          PTT - ( 2019 06:10 )  PTT:29.2 SEC        Imaging: Chief Complaint:  Patient is a 38y old  Female who presents with a chief complaint of Hyperbilirubinemia (2019 05:53)      Interval Events:   s/p ERCP with spincterotomy, balloon sweep with removal of multipel stones. Found to have stricturing of the common hepatic duct without fistulization consistent with Mirizzi's syndrome s/p stent placement with good bile flow.  Patient reports improved abdominal pain this AM. Feels a little nauseous this morning but no N/V. Denies any fevers or chills.    Allergies:  No Known Allergies      Home Medications:    Hospital Medications:  folic acid 1 milliGRAM(s) Oral daily  HYDROmorphone  Injectable 0.5 milliGRAM(s) IV Push every 4 hours PRN  lactated ringers. 1000 milliLiter(s) IV Continuous <Continuous>  ondansetron Injectable 4 milliGRAM(s) IV Push every 6 hours PRN  piperacillin/tazobactam IVPB.. 3.375 Gram(s) IV Intermittent every 8 hours  sodium chloride 0.9%. 1000 milliLiter(s) IV Continuous <Continuous>      PMHX/PSHX:  IUP (intrauterine pregnancy), incidental  Mucous polyp of cervix  Hereditary spherocytosis   history      Family history:  Family history of hypertension  Family history of hereditary spherocytosis      ROS:     General:  No wt loss, fevers, chills, night sweats, fatigue,   Eyes:  Good vision, no reported pain  ENT:  No sore throat, pain, runny nose, dysphagia  CV:  No pain, palpitations, hypo/hypertension  Resp:  No dyspnea, cough, tachypnea, wheezing  GI:  No pain, No nausea, No vomiting, No diarrhea, No constipation, No weight loss, No fever, No pruritis, No rectal bleeding, No tarry stools, No dysphagia,  :  No pain, bleeding, incontinence, nocturia  Muscle:  No pain, weakness  Neuro:  No weakness, tingling, memory problems  Psych:  No fatigue, insomnia, mood problems, depression  Endocrine:  No polyuria, polydipsia, cold/heat intolerance  Heme:  No petechiae, ecchymosis, easy bruisability  Skin:  No rash, tattoos, scars, edema      PHYSICAL EXAM:   Vital Signs:  Vital Signs Last 24 Hrs  T(C): 36.7 (2019 05:47), Max: 37 (2019 10:49)  T(F): 98.1 (2019 05:47), Max: 98.6 (2019 10:49)  HR: 85 (2019 05:47) (85 - 107)  BP: 108/62 (2019 05:47) (107/61 - 127/62)  BP(mean): --  RR: 16 (2019 05:47) (16 - 18)  SpO2: 95% (2019 05:47) (95% - 99%)  Daily     Daily     GENERAL:  NAD  HEENT:   sclera icteric  CHEST:  Full & symmetric excursion, no increased effort  HEART:  Regular rhythm, S1, S2  ABDOMEN:  soft, nt, nd, no rebound or guarding  EXTEREMITIES:  no cyanosis,clubbing or edema  SKIN:  +jaundice  NEURO:  Alert, oriented, no asterixis, no tremor, no encephalopathy    LABS:                        7.5    10.37 )-----------( 177      ( 2019 06:10 )             23.3         137  |  101  |  6<L>  ----------------------------<  101<H>  3.4<L>   |  25  |  0.55    Ca    8.9      2019 06:10  Phos  2.7       Mg     1.8         TPro  6.1  /  Alb  3.4  /  TBili  21.7<H>  /  DBili  x   /  AST  16  /  ALT  31  /  AlkPhos  73  29    LIVER FUNCTIONS - ( 2019 06:10 )  Alb: 3.4 g/dL / Pro: 6.1 g/dL / ALK PHOS: 73 u/L / ALT: 31 u/L / AST: 16 u/L / GGT: x           PT/INR - ( 2019 06:10 )   PT: 14.3 SEC;   INR: 1.28          PTT - ( 2019 06:10 )  PTT:29.2 SEC        Imaging:

## 2019-07-30 NOTE — PROGRESS NOTE ADULT - ASSESSMENT
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 Cholangiogram showing stricturing at [facility] but not fistula. Currently responding well to medical therapy. T bili of 33 on admission but now downtrending.  # Acute Pancreatitis  Lipase of >600 on admission. Most likely gallstone pancreatitis  #Spherocytosis    Recommendation:  - continue IV abx  - trend LFTs and CBC daily  - continue IVF @ 200cc/hr  - surgery consult, f/u recs for cholecystectomy  - pain management as per primary team  - can advance diet as tolerated today  -ERCP on Monday prior to cholecystectomy   -doppler US to r/o hepatic vein thrombosis

## 2019-07-30 NOTE — PROGRESS NOTE ADULT - SUBJECTIVE AND OBJECTIVE BOX
Luna Mast, PGY1  J [id number]  [phone]    Patient is a 38y old  Female who presents with a chief complaint of Hyperbilirubinemia (29 Jul 2019 11:56)      SUBJECTIVE / OVERNIGHT EVENTS:  Patient seen and examined at bedside.    Other Review of Systems Negative.    MEDICATIONS  (STANDING):  folic acid 1 milliGRAM(s) Oral daily  lactated ringers. 1000 milliLiter(s) (999 mL/Hr) IV Continuous <Continuous>  piperacillin/tazobactam IVPB.. 3.375 Gram(s) IV Intermittent every 8 hours  sodium chloride 0.9%. 1000 milliLiter(s) (100 mL/Hr) IV Continuous <Continuous>    MEDICATIONS  (PRN):  HYDROmorphone  Injectable 0.5 milliGRAM(s) IV Push every 4 hours PRN Severe Pain (7 - 10)  ondansetron Injectable 4 milliGRAM(s) IV Push every 6 hours PRN Nausea and/or Vomiting      OBJECTIVE:    Vital Signs Last 24 Hrs  T(C): 36.7 (30 Jul 2019 05:47), Max: 37 (29 Jul 2019 10:49)  T(F): 98.1 (30 Jul 2019 05:47), Max: 98.6 (29 Jul 2019 10:49)  HR: 85 (30 Jul 2019 05:47) (85 - 107)  BP: 108/62 (30 Jul 2019 05:47) (107/61 - 127/62)  BP(mean): --  RR: 16 (30 Jul 2019 05:47) (16 - 18)  SpO2: 95% (30 Jul 2019 05:47) (95% - 100%)    CAPILLARY BLOOD GLUCOSE        I&O's Summary    28 Jul 2019 07:01  -  29 Jul 2019 07:00  --------------------------------------------------------  IN: 1100 mL / OUT: 0 mL / NET: 1100 mL        PHYSICAL EXAM:  GENERAL: NAD, well-developed, laying in bed, speaking in full sentences  EENT: EOMI, PERRLA, scleral icterus, sublingual icterus  NECK: Supple, No JVD.   CHEST/LUNG: Clear to auscultation bilaterally; No wheezes  HEART: Tachycardic, Regular rhythm; No murmurs, rubs, or gallops  ABDOMEN: Soft, slightly distended, tender to palpation throughout, + BS, + rebound tenderness  EXTREMITIES: 2+ Peripheral Pulses, No clubbing, cyanosis, or edema  PSYCH: AAOx3  NEUROLOGY: non-focal  SKIN: No rashes or lesions    LABS:                        7.5    10.37 )-----------( 177      ( 29 Jul 2019 06:10 )             23.3     Auto Eosinophil # x     / Auto Eosinophil % x     / Auto Neutrophil # x     / Auto Neutrophil % x     / BANDS % x                            8.0    11.52 )-----------( 166      ( 28 Jul 2019 10:35 )             23.7     Auto Eosinophil # x     / Auto Eosinophil % x     / Auto Neutrophil # x     / Auto Neutrophil % x     / BANDS % x        07-29    137  |  101  |  6<L>  ----------------------------<  101<H>  3.4<L>   |  25  |  0.55  07-28    138  |  99  |  7   ----------------------------<  112<H>  3.0<L>   |  25  |  0.45<L>    Ca    8.9      29 Jul 2019 06:10  Mg     1.8     07-29  Phos  2.7     07-29  TPro  6.1  /  Alb  3.4  /  TBili  21.7<H>  /  DBili  x   /  AST  16  /  ALT  31  /  AlkPhos  73  07-29  TPro  6.2  /  Alb  3.8  /  TBili  29.3<H>  /  DBili  x   /  AST  14  /  ALT  40<H>  /  AlkPhos  67  07-28    PT/INR - ( 29 Jul 2019 06:10 )   PT: 14.3 SEC;   INR: 1.28          PTT - ( 29 Jul 2019 06:10 )  PTT:29.2 SEC        Lactate, Blood: 0.7 mmol/L (07-26 @ 11:50)        ABG:     VBG:       Care Discussed with Consultants/Other Providers:    RADIOLOGY & ADDITIONAL TESTS:  (Imaging Personally Reviewed) Luna Mast, PGY1  J [id number]  [phone]    Patient is a 38y old  Female who presents with a chief complaint of Hyperbilirubinemia (29 Jul 2019 11:56)      SUBJECTIVE / OVERNIGHT EVENTS:  Patient seen and examined at bedside. Patient reports feeling okay today. She has complaints of a sore throat, nausea but no vomiting, mild bloating, and LUQ abdominal pain. She denies fever.     Other Review of Systems Negative.    MEDICATIONS  (STANDING):  folic acid 1 milliGRAM(s) Oral daily  lactated ringers. 1000 milliLiter(s) (999 mL/Hr) IV Continuous <Continuous>  piperacillin/tazobactam IVPB.. 3.375 Gram(s) IV Intermittent every 8 hours  sodium chloride 0.9%. 1000 milliLiter(s) (100 mL/Hr) IV Continuous <Continuous>    MEDICATIONS  (PRN):  HYDROmorphone  Injectable 0.5 milliGRAM(s) IV Push every 4 hours PRN Severe Pain (7 - 10)  ondansetron Injectable 4 milliGRAM(s) IV Push every 6 hours PRN Nausea and/or Vomiting      OBJECTIVE:  Vital Signs Last 24 Hrs  T(C): 36.7 (30 Jul 2019 05:47), Max: 37 (29 Jul 2019 10:49)  T(F): 98.1 (30 Jul 2019 05:47), Max: 98.6 (29 Jul 2019 10:49)  HR: 85 (30 Jul 2019 05:47) (85 - 107)  BP: 108/62 (30 Jul 2019 05:47) (107/61 - 127/62)  BP(mean): --  RR: 16 (30 Jul 2019 05:47) (16 - 18)  SpO2: 95% (30 Jul 2019 05:47) (95% - 100%)    I&O's Summary  28 Jul 2019 07:01  -  29 Jul 2019 07:00  --------------------------------------------------------  IN: 1100 mL / OUT: 0 mL / NET: 1100 mL    PHYSICAL EXAM:  GENERAL: NAD, well-developed, laying in bed, speaking in full sentences  EENT: EOMI, PERRLA, scleral icterus, sublingual icterus  NECK: Supple, No JVD.   CHEST/LUNG: Clear to auscultation bilaterally; No wheezes  HEART: Regular rate and rhythm; No murmurs, rubs, or gallops  ABDOMEN: Soft, slightly distended, tender to palpation throughout, + BS, + rebound tenderness  EXTREMITIES: 2+ Peripheral Pulses, No clubbing, cyanosis, or edema  PSYCH: AAOx3  NEUROLOGY: non-focal  SKIN: No rashes or lesions    LABS:                        7.5    10.37 )-----------( 177      ( 29 Jul 2019 06:10 )             23.3     Auto Eosinophil # x     / Auto Eosinophil % x     / Auto Neutrophil # x     / Auto Neutrophil % x     / BANDS % x                            8.0    11.52 )-----------( 166      ( 28 Jul 2019 10:35 )             23.7     Auto Eosinophil # x     / Auto Eosinophil % x     / Auto Neutrophil # x     / Auto Neutrophil % x     / BANDS % x        07-29    137  |  101  |  6<L>  ----------------------------<  101<H>  3.4<L>   |  25  |  0.55  07-28    138  |  99  |  7   ----------------------------<  112<H>  3.0<L>   |  25  |  0.45<L>    Ca    8.9      29 Jul 2019 06:10  Mg     1.8     07-29  Phos  2.7     07-29  TPro  6.1  /  Alb  3.4  /  TBili  21.7<H>  /  DBili  x   /  AST  16  /  ALT  31  /  AlkPhos  73  07-29  TPro  6.2  /  Alb  3.8  /  TBili  29.3<H>  /  DBili  x   /  AST  14  /  ALT  40<H>  /  AlkPhos  67  07-28    PT/INR - ( 29 Jul 2019 06:10 )   PT: 14.3 SEC;   INR: 1.28   PTT - ( 29 Jul 2019 06:10 )  PTT:29.2 SEC    Lactate, Blood: 0.7 mmol/L (07-26 @ 11:50)    Care Discussed with Consultants/Other Providers:    RADIOLOGY & ADDITIONAL TESTS:  (Imaging Personally Reviewed)

## 2019-07-30 NOTE — PROGRESS NOTE ADULT - PROBLEM SELECTOR PLAN 5
- Bilirubin 39.1, likely 2/2 to hemolysis given underlying HS and acute quincy  - Resolving to baseline  - c/t trend (39--> 29.3); last labs from 2015 at which time bili= 24 - Bilirubin 39.1, likely 2/2 to hemolysis given underlying HS and acute quincy  - Resolving to baseline  - c/t trend (39--> 21.7); last labs from 2015 at which time bili= 24

## 2019-07-30 NOTE — PROGRESS NOTE ADULT - PROBLEM SELECTOR PLAN 6
- AST 82->14, ->40   - Resolving  - c/w IVF as above - AST 82->14, ->40   - Resolved  - c/w IVF as above

## 2019-07-30 NOTE — PROGRESS NOTE ADULT - PROBLEM SELECTOR PLAN 8
DVT PPX: improve score of .4 will encourage ambulation   Diet: NPO   Dispo: likely home, no needs DVT PPX: improve score of .4 will encourage ambulation   Diet: full liquid, advance diet as tolerated  Dispo: likely home, no needs

## 2019-07-30 NOTE — PROGRESS NOTE ADULT - ASSESSMENT
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 Currently awaiting ERCP on 7/29 and possible same day cholecystectomy. 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 s/p ERCP with sphincterotomy and balloon sweep with removal of multiple stones. Plan for outpatient cholecystectomy.

## 2019-07-30 NOTE — PROGRESS NOTE ADULT - PROBLEM SELECTOR PLAN 4
- acute on chronic hemolysis likely in setting of acute infection  - s/p 1 unit PRBC   - will check CBC q24 hours and transfuse as needed  - elevated bili in setting of hemolysis and acute quincy - acute on chronic hemolysis likely in setting of acute infection  - s/p 1 unit PRBC   - will check CBC q24 hours and transfuse as needed  - elevated bili in setting of hemolysis and acute quincy, resolving to baseline

## 2019-07-30 NOTE — PROGRESS NOTE ADULT - ASSESSMENT
38F hx hereditary spherocytosis admitted for imaging showing Mirizzi syndrome and pancreatitis     - likely f/u for outpatient lap quincy  - Pain control  - Zosyn  - Trend T bili, monitor vital  - f/u GI  - Rest of care per primary, please call w/ questions    B Surgery  [phone] with any questions

## 2019-07-30 NOTE — PROGRESS NOTE ADULT - PROBLEM SELECTOR PLAN 2
- Meets infectious workup for leukocytosis 21.39, tachycardic to the 130's with source presumed to be intraabdominal given her complaints and physical examination  - c/w zosyn for anaerobic and gram neg coverage (7/26 ->   - blood cultures with 1 bottle growing gram + rods with neg PCR, likely contaminant   - Repeat BCx NGTD  - Continue IVF LR 100cc/ hour - Meets infectious workup for leukocytosis 21.39, tachycardic to the 130's with source presumed to be intraabdominal given her complaints and physical examination  - c/w zosyn for anaerobic and gram neg coverage (7/26 ->   - blood cultures with 1 bottle growing gram + rods with neg PCR, likely contaminant   - Repeat BCx NGTD  - Continue IVF LR 200cc/ hour

## 2019-07-31 ENCOUNTER — TRANSCRIPTION ENCOUNTER (OUTPATIENT)
Age: 39
End: 2019-07-31

## 2019-07-31 LAB
ALBUMIN SERPL ELPH-MCNC: 3.5 G/DL — SIGNIFICANT CHANGE UP (ref 3.3–5)
ALP SERPL-CCNC: 81 U/L — SIGNIFICANT CHANGE UP (ref 40–120)
ALT FLD-CCNC: 49 U/L — HIGH (ref 4–33)
ANION GAP SERPL CALC-SCNC: 14 MMO/L — SIGNIFICANT CHANGE UP (ref 7–14)
AST SERPL-CCNC: 43 U/L — HIGH (ref 4–32)
BACTERIA BLD CULT: SIGNIFICANT CHANGE UP
BILIRUB SERPL-MCNC: 15.3 MG/DL — HIGH (ref 0.2–1.2)
BUN SERPL-MCNC: 6 MG/DL — LOW (ref 7–23)
CALCIUM SERPL-MCNC: 9.3 MG/DL — SIGNIFICANT CHANGE UP (ref 8.4–10.5)
CHLORIDE SERPL-SCNC: 100 MMOL/L — SIGNIFICANT CHANGE UP (ref 98–107)
CO2 SERPL-SCNC: 26 MMOL/L — SIGNIFICANT CHANGE UP (ref 22–31)
CREAT SERPL-MCNC: 0.67 MG/DL — SIGNIFICANT CHANGE UP (ref 0.5–1.3)
GLUCOSE SERPL-MCNC: 96 MG/DL — SIGNIFICANT CHANGE UP (ref 70–99)
HCT VFR BLD CALC: 23.3 % — LOW (ref 34.5–45)
HGB BLD-MCNC: 7.4 G/DL — LOW (ref 11.5–15.5)
MAGNESIUM SERPL-MCNC: 1.7 MG/DL — SIGNIFICANT CHANGE UP (ref 1.6–2.6)
MCHC RBC-ENTMCNC: 28.1 PG — SIGNIFICANT CHANGE UP (ref 27–34)
MCHC RBC-ENTMCNC: 31.8 % — LOW (ref 32–36)
MCV RBC AUTO: 88.6 FL — SIGNIFICANT CHANGE UP (ref 80–100)
NRBC # FLD: 0.31 K/UL — SIGNIFICANT CHANGE UP (ref 0–0)
NRBC FLD-RTO: 3.7 — SIGNIFICANT CHANGE UP
PHOSPHATE SERPL-MCNC: 3.4 MG/DL — SIGNIFICANT CHANGE UP (ref 2.5–4.5)
PLATELET # BLD AUTO: 205 K/UL — SIGNIFICANT CHANGE UP (ref 150–400)
PMV BLD: 10.9 FL — SIGNIFICANT CHANGE UP (ref 7–13)
POTASSIUM SERPL-MCNC: 3.3 MMOL/L — LOW (ref 3.5–5.3)
POTASSIUM SERPL-SCNC: 3.3 MMOL/L — LOW (ref 3.5–5.3)
PROT SERPL-MCNC: 6.2 G/DL — SIGNIFICANT CHANGE UP (ref 6–8.3)
RBC # BLD: 2.63 M/UL — LOW (ref 3.8–5.2)
RBC # FLD: 23.4 % — HIGH (ref 10.3–14.5)
SODIUM SERPL-SCNC: 140 MMOL/L — SIGNIFICANT CHANGE UP (ref 135–145)
WBC # BLD: 8.47 K/UL — SIGNIFICANT CHANGE UP (ref 3.8–10.5)
WBC # FLD AUTO: 8.47 K/UL — SIGNIFICANT CHANGE UP (ref 3.8–10.5)

## 2019-07-31 PROCEDURE — 99233 SBSQ HOSP IP/OBS HIGH 50: CPT

## 2019-07-31 PROCEDURE — 99232 SBSQ HOSP IP/OBS MODERATE 35: CPT | Mod: GC

## 2019-07-31 RX ORDER — POTASSIUM CHLORIDE 20 MEQ
10 PACKET (EA) ORAL
Refills: 0 | Status: COMPLETED | OUTPATIENT
Start: 2019-07-31 | End: 2019-07-31

## 2019-07-31 RX ORDER — SODIUM CHLORIDE 9 MG/ML
1000 INJECTION, SOLUTION INTRAVENOUS
Refills: 0 | Status: DISCONTINUED | OUTPATIENT
Start: 2019-07-31 | End: 2019-08-01

## 2019-07-31 RX ORDER — FAMOTIDINE 10 MG/ML
20 INJECTION INTRAVENOUS ONCE
Refills: 0 | Status: COMPLETED | OUTPATIENT
Start: 2019-07-31 | End: 2019-07-31

## 2019-07-31 RX ORDER — PANTOPRAZOLE SODIUM 20 MG/1
40 TABLET, DELAYED RELEASE ORAL
Refills: 0 | Status: DISCONTINUED | OUTPATIENT
Start: 2019-07-31 | End: 2019-08-02

## 2019-07-31 RX ADMIN — Medication 100 MILLIEQUIVALENT(S): at 10:35

## 2019-07-31 RX ADMIN — Medication 100 MILLIEQUIVALENT(S): at 11:43

## 2019-07-31 RX ADMIN — PIPERACILLIN AND TAZOBACTAM 25 GRAM(S): 4; .5 INJECTION, POWDER, LYOPHILIZED, FOR SOLUTION INTRAVENOUS at 14:32

## 2019-07-31 RX ADMIN — Medication 100 MILLIEQUIVALENT(S): at 13:15

## 2019-07-31 RX ADMIN — PANTOPRAZOLE SODIUM 40 MILLIGRAM(S): 20 TABLET, DELAYED RELEASE ORAL at 10:35

## 2019-07-31 RX ADMIN — SODIUM CHLORIDE 200 MILLILITER(S): 9 INJECTION, SOLUTION INTRAVENOUS at 10:35

## 2019-07-31 RX ADMIN — FAMOTIDINE 20 MILLIGRAM(S): 10 INJECTION INTRAVENOUS at 17:02

## 2019-07-31 RX ADMIN — Medication 1 MILLIGRAM(S): at 12:31

## 2019-07-31 RX ADMIN — PIPERACILLIN AND TAZOBACTAM 25 GRAM(S): 4; .5 INJECTION, POWDER, LYOPHILIZED, FOR SOLUTION INTRAVENOUS at 21:17

## 2019-07-31 RX ADMIN — PIPERACILLIN AND TAZOBACTAM 25 GRAM(S): 4; .5 INJECTION, POWDER, LYOPHILIZED, FOR SOLUTION INTRAVENOUS at 05:44

## 2019-07-31 RX ADMIN — SODIUM CHLORIDE 200 MILLILITER(S): 9 INJECTION, SOLUTION INTRAVENOUS at 22:41

## 2019-07-31 RX ADMIN — SODIUM CHLORIDE 200 MILLILITER(S): 9 INJECTION, SOLUTION INTRAVENOUS at 17:03

## 2019-07-31 NOTE — DISCHARGE NOTE PROVIDER - PROVIDER TOKENS
PROVIDER:[TOKEN:[[id number]]] PROVIDER:[TOKEN:[[id number]]],PROVIDER:[TOKEN:[[id number]],FOLLOWUP:[1 week]]

## 2019-07-31 NOTE — PROGRESS NOTE ADULT - SUBJECTIVE AND OBJECTIVE BOX
Chief Complaint:  Patient is a 38y old  Female who presents with a chief complaint of Hyperbilirubinemia (2019 06:00)      Interval Events: No adverse events overnight    Allergies:  No Known Allergies      Hospital Medications:  folic acid 1 milliGRAM(s) Oral daily  HYDROmorphone  Injectable 0.5 milliGRAM(s) IV Push every 4 hours PRN  ondansetron Injectable 4 milliGRAM(s) IV Push every 6 hours PRN  piperacillin/tazobactam IVPB.. 3.375 Gram(s) IV Intermittent every 8 hours      PMHX/PSHX:  IUP (intrauterine pregnancy), incidental  Mucous polyp of cervix  Hereditary spherocytosis   history      Family history:  Family history of hypertension  Family history of hereditary spherocytosis      ROS:     General:  No wt loss, fevers, chills, night sweats, fatigue,   Eyes:  Good vision, no reported pain  ENT:  No sore throat, pain, runny nose, dysphagia  CV:  No pain, palpitations, hypo/hypertension  Resp:  No dyspnea, cough, tachypnea, wheezing  GI:  See HPI  :  No pain, bleeding, incontinence, nocturia  Muscle:  No pain, weakness  Neuro:  No weakness, tingling, memory problems  Psych:  No fatigue, insomnia, mood problems, depression  Endocrine:  No polyuria, polydipsia, cold/heat intolerance  Heme:  No petechiae, ecchymosis, easy bruisability  Skin:  No rash, edema      PHYSICAL EXAM:     GENERAL: NAD  HEENT:  NC/AT  CHEST:  Full & symmetric excursion, no increased effort  ABDOMEN:  Soft, non-tender, non-distended, +BS  EXTREMITIES:  no edema  SKIN:  No rash  NEURO:  Alert      Vital Signs:  Vital Signs Last 24 Hrs  T(C): 37.1 (2019 05:42), Max: 37.1 (2019 05:42)  T(F): 98.8 (2019 05:42), Max: 98.8 (2019 05:42)  HR: 88 (2019 05:42) (88 - 97)  BP: 109/58 (2019 05:42) (101/78 - 116/59)  BP(mean): --  RR: 17 (2019 05:42) (17 - 17)  SpO2: 99% (2019 05:42) (95% - 100%)  Daily     Daily     LABS:                        7.5    9.98  )-----------( 203      ( 2019 06:38 )             23.0     07-30    137  |  100  |  8   ----------------------------<  97  3.5   |  25  |  0.52    Ca    9.0      2019 06:38  Phos  3.9     07-30  Mg     1.8                     Imaging:  < from: ERCP (19 @ 17:13) >    [facility]  _______________________________________________________________________________  Patient Name: Domitila Bal          Procedure Date: 2019 5:13 PM  MRN: [id number]                     Account Number: [id number]  YOB: 1980              Admit Type: Inpatient  Room: [place] [address]                         Gender: Female  Attending MD: SHAD FERNANDEZ MD        _______________________________________________________________________________     Procedure:           ERCP  Indications:         Jaundice, Common bile duct stricture  Providers:           SHAD FERNANDEZ MD, ANNALISA CARMICHAEL MD (Fellow)  Medicines:           General Anesthesia  Complications:       No immediate complications.  Procedure:           After obtaining informed consent, the scope was passed                        under direct vision. Throughout the procedure, the                        patient's blood pressure, pulse, and oxygen saturations                        were monitored continuously. The ERCP was introduced                        through the mouth, and advanced to the duodenum and used                        to inject contrast into the bile duct. The ERCP was                        accomplished without difficulty. The patient tolerated                        the procedure well.                                                                                   Findings:       The duodenoscope was passed into D2. The ampulla was identified and the        bile duct was readily cannulated. A cholangiogram was performed which        showed a CHD stricture. The biliary tree was swept with an 11.5 mm        balloon starting at the bifurcation. A few stones were removed. No        stones remained. One 10 Fr by9 cm plastic stent with a single external        flap and a single internal flap was placed into the common hepatic duct.        Bile flowed through the stent. The stent was in good position.                         Impression:          - Biliary access was obtained. A cholangiogram was                        suggestive of a mild CHD stricture. Sweeps with a                        balloon produced multiple small black stones.          - A stent was placed into the CHD. 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 The                        stricture is mild and there is no obvious                        cholecystobiliary fistula.  Recommendation:      - Return patient to hospital [facility] for ongoing care.                       - Recommend cholecystectomy.                                                                                   Attending Participation:       I personally performed the entire procedure.                                                                                     _________________  SHAD FERNANDEZ MD  2019 6:23:10 PM  This report has been signed electronically.  Number of Addenda: 0    Note Initiated On: 2019 5:13 PM    < end of copied text >      < from: US Abdomen Doppler (19 @ 09:38) >    EXAM:  US DPLX ABDOMEN        PROCEDURE DATE:  2019         INTERPRETATION:  Clinical data: 38-year-old female with acute   cholecystitis and pancreatitis. Question of hepatic vein thrombosis on   MRI.    Comparison: MRI 2019.        TECHNIQUE: Sonography of the right upper quadrant with color flow Doppler    FINDINGS:    Liver: Within normal limits. Patent hepatic and portal veins.    Bile ducts: Normal caliber.     Gallbladder: Gallstones.    Pancreas: Visualized portions are within normal limits.    Right kidney: 11.6 cm. No hydronephrosis.    Ascites: None.    IVC: Visualized portions are within normal limits.    IMPRESSION:     Gallstones.  Patent hepatic veins                  TERENCE BARR M.D., ATTENDING RADIOLOGIST  This document has been electronically signed. 2019 10:03AM                  < end of copied text > Chief Complaint:  Patient is a 38y old  Female who presents with a chief complaint of Hyperbilirubinemia (2019 06:00)      Interval Events: No adverse events overnight.  Patient still with nausea.  She reports frustration bc she wants to have inpatient surgery so she can move on with her life.    Allergies:  No Known Allergies      Hospital Medications:  folic acid 1 milliGRAM(s) Oral daily  HYDROmorphone  Injectable 0.5 milliGRAM(s) IV Push every 4 hours PRN  ondansetron Injectable 4 milliGRAM(s) IV Push every 6 hours PRN  piperacillin/tazobactam IVPB.. 3.375 Gram(s) IV Intermittent every 8 hours      PMHX/PSHX:  IUP (intrauterine pregnancy), incidental  Mucous polyp of cervix  Hereditary spherocytosis   history      Family history:  Family history of hypertension  Family history of hereditary spherocytosis      ROS:     General:  No wt loss, fevers, chills, night sweats, fatigue,   Eyes:  Good vision, no reported pain  ENT:  No sore throat, pain, runny nose, dysphagia  CV:  No pain, palpitations, hypo/hypertension  Resp:  No dyspnea, cough, tachypnea, wheezing  GI:  See HPI  :  No pain, bleeding, incontinence, nocturia  Muscle:  No pain, weakness  Neuro:  No weakness, tingling, memory problems  Psych:  No fatigue, insomnia, mood problems, depression  Endocrine:  No polyuria, polydipsia, cold/heat intolerance  Heme:  No petechiae, ecchymosis, easy bruisability  Skin:  No rash, edema      PHYSICAL EXAM:     GENERAL: NAD  HEENT:  NC/AT  CHEST:  Full & symmetric excursion, no increased effort  ABDOMEN:  Soft, non-tender, non-distended, +BS  EXTREMITIES:  no edema  SKIN:  No rash  NEURO:  Alert      Vital Signs:  Vital Signs Last 24 Hrs  T(C): 37.1 (2019 05:42), Max: 37.1 (2019 05:42)  T(F): 98.8 (2019 05:42), Max: 98.8 (2019 05:42)  HR: 88 (2019 05:42) (88 - 97)  BP: 109/58 (2019 05:42) (101/78 - 116/59)  BP(mean): --  RR: 17 (2019 05:42) (17 - 17)  SpO2: 99% (2019 05:42) (95% - 100%)  Daily     Daily     LABS:                        7.5    9.98  )-----------( 203      ( 2019 06:38 )             23.0         137  |  100  |  8   ----------------------------<  97  3.5   |  25  |  0.52    Ca    9.0      2019 06:38  Phos  3.9       Mg     1.8                     Imaging:  < from: ERCP (19 @ 17:13) >    [facility]  _______________________________________________________________________________  Patient Name: Domitila Bal          Procedure Date: 2019 5:13 PM  MRN: [id number]                     Account Number: [id number]  YOB: 1980              Admit Type: Inpatient  Room: [place] [address]                         Gender: Female  Attending MD: SHAD FERNANDEZ MD        _______________________________________________________________________________     Procedure:           ERCP  Indications:         Jaundice, Common bile duct stricture  Providers:           SHAD FRENANDEZ MD, ANNALISA CARMICHAEL MD (Fellow)  Medicines:           General Anesthesia  Complications:       No immediate complications.  Procedure:           After obtaining informed consent, the scope was passed                        under direct vision. Throughout the procedure, the                        patient's blood pressure, pulse, and oxygen saturations                        were monitored continuously. The ERCP was introduced                        through the mouth, and advanced to the duodenum and used                        to inject contrast into the bile duct. The ERCP was                        accomplished without difficulty. The patient tolerated                        the procedure well.                                                                                   Findings:       The duodenoscope was passed into D2. The ampulla was identified and the        bile duct was readily cannulated. A cholangiogram was performed which        showed a CHD stricture. The biliary tree was swept with an 11.5 mm        balloon starting at the bifurcation. A few stones were removed. No        stones remained. One 10 Fr by9 cm plastic stent with a single external        flap and a single internal flap was placed into the common hepatic duct.        Bile flowed through the stent. The stent was in good position.                         Impression:          - Biliary access was obtained. A cholangiogram was                        suggestive of a mild CHD stricture. Sweeps with a                        balloon produced multiple small black stones.          - A stent was placed into the CHD. 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 The                        stricture is mild and there is no obvious                        cholecystobiliary fistula.  Recommendation:      - Return patient to hospital [facility] for ongoing care.                       - Recommend cholecystectomy.                                                                                   Attending Participation:       I personally performed the entire procedure.                                                                                     _________________  SHAD FERNANDEZ MD  2019 6:23:10 PM  This report has been signed electronically.  Number of Addenda: 0    Note Initiated On: 2019 5:13 PM    < end of copied text >      < from: US Abdomen Doppler (. @ 09:38) >    EXAM:  US DPLX ABDOMEN        PROCEDURE DATE:  2019         INTERPRETATION:  Clinical data: 38-year-old female with acute   cholecystitis and pancreatitis. Question of hepatic vein thrombosis on   MRI.    Comparison: MRI 2019.        TECHNIQUE: Sonography of the right upper quadrant with color flow Doppler    FINDINGS:    Liver: Within normal limits. Patent hepatic and portal veins.    Bile ducts: Normal caliber.     Gallbladder: Gallstones.    Pancreas: Visualized portions are within normal limits.    Right kidney: 11.6 cm. No hydronephrosis.    Ascites: None.    IVC: Visualized portions are within normal limits.    IMPRESSION:     Gallstones.  Patent hepatic veins                  TERENCE BARR M.D., ATTENDING RADIOLOGIST  This document has been electronically signed. 2019 10:03AM                  < end of copied text >

## 2019-07-31 NOTE — PROGRESS NOTE ADULT - PROBLEM SELECTOR PLAN 8
DVT PPX: improve score of .4 will encourage ambulation   Diet: full liquid, advance diet as tolerated  Dispo: likely home, no needs

## 2019-07-31 NOTE — DISCHARGE NOTE PROVIDER - CARE PROVIDERS DIRECT ADDRESSES
,DirectAddress_Unknown ,DirectAddress_Unknown,[email or web address]

## 2019-07-31 NOTE — PROGRESS NOTE ADULT - PROBLEM SELECTOR PLAN 5
- Bilirubin 39.1, likely 2/2 to hemolysis given underlying HS and acute quincy  - Resolving to baseline  - c/t trend (39--> 21.7); last labs from 2015 at which time bili= 24

## 2019-07-31 NOTE — DISCHARGE NOTE PROVIDER - HOSPITAL COURSE
38 F PMH hereditary spherocytosis (diagnosed at [facility] 26 years ago), polyp hx presents to the ED with chief complaint of "bloody urine". She states that on Wednesday morning of this week she had cramping abdominal pain which she describes as "laboring pains as if she was giving birth". She states pain is located on the right lateral periumbilical area and is diffuse. She has to sleep on one side to relief discomfort. She has tried advil, but this does not relieve pain. She has taken warm baths, which soothes pain but feels both " cold and hot at the same time " when doing so. She denies fever, chills , night sweats, weight loss. Has LLQ when voiding. She denies nausea. Has experienced non remitting  vomiting. Was not able to tolerate anything po including the advil. She noticed her urine was "coca [employer]" colored once on thursday which alarmed her enough to go to the ED. She additionally notes yellowing of her eyes. Which she notes at times happens when she is stressed or tired.         VS in the ED: Bp 123/75, , RR 18, T 100.2F, Saturating 100%. In the ED, her lab work was significant for leukocytosis and elevated TBili to ~40. She was given ibuprofen 600mg PO x1, toradol 15mg IVP x2, and cefepime 1g IVPB x1. She was then admitted to medicine.         Floor course: Patient was medically optimized for surgery with 1u pRBC and electrolyte repletion.  ERCP with sphincterotomy and balloon sweep with removal of multiple stones was performed on 7/29. _____        Patient was hemodynamically stable and was discharged with close follow up. 38 F PMH hereditary spherocytosis (diagnosed at [facility] 26 years ago), polyp hx presents to the ED with chief complaint of "bloody urine". She states that on Wednesday morning of this week she had cramping abdominal pain which she describes as "laboring pains as if she was giving birth". She states pain is located on the right lateral periumbilical area and is diffuse. She has to sleep on one side to relief discomfort. She has tried advil, but this does not relieve pain. She has taken warm baths, which soothes pain but feels both " cold and hot at the same time " when doing so. She denies fever, chills, night sweats, weight loss. Has LLQ when voiding. She denies nausea. Has experienced non remitting vomiting. Was not able to tolerate anything po including the advil. She noticed her urine was "coca col[employer]" colored once on thursday which alarmed her enough to go to the ED. She additionally notes yellowing of her eyes. Which she notes at times happens when she is stressed or tired.         VS in the ED: Bp 123/75, , RR 18, T 100.2F, Saturating 100%. In the ED, her lab work was significant for leukocytosis and elevated TBili to ~40. She was given ibuprofen 600mg PO x1, toradol 15mg IVP x2, and cefepime 1g IVPB x1. She was then admitted to medicine.         Floor course: Patient was medically optimized for surgery with 1u pRBC and electrolyte repletion.  An ERCP with sphincterotomy and balloon sweep with removal of multiple stones was performed on 7/29 with a plan made for outpatient cholecystectomy. Her leukocytosis resolved, Hb was stable in the 7s, and bilirubin returned to normal limits. With improvement of her nausea and bloating, she was able to tolerate PO intake. Her abdominal pain was well managed with dilaudid 0.5mg BID. She completed 7 days of zosyn. Patient was hemodynamically stable and was discharged with close follow up.

## 2019-07-31 NOTE — PROGRESS NOTE ADULT - PROBLEM SELECTOR PLAN 1
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

## 2019-07-31 NOTE — PROGRESS NOTE ADULT - SUBJECTIVE AND OBJECTIVE BOX
GENERAL SURGERY DAILY PROGRESS NOTE:     Subjective:  Pt seen and examined. No acute events overnight. Reports pain is improved. Had some nausea with clears yesterday.    Objective:    MEDICATIONS  (STANDING):  folic acid 1 milliGRAM(s) Oral daily  piperacillin/tazobactam IVPB.. 3.375 Gram(s) IV Intermittent every 8 hours    MEDICATIONS  (PRN):  HYDROmorphone  Injectable 0.5 milliGRAM(s) IV Push every 4 hours PRN Severe Pain (7 - 10)  ondansetron Injectable 4 milliGRAM(s) IV Push every 6 hours PRN Nausea and/or Vomiting      Vital Signs Last 24 Hrs  T(C): 37.1 (31 Jul 2019 05:42), Max: 37.1 (31 Jul 2019 05:42)  T(F): 98.8 (31 Jul 2019 05:42), Max: 98.8 (31 Jul 2019 05:42)  HR: 88 (31 Jul 2019 05:42) (88 - 97)  BP: 109/58 (31 Jul 2019 05:42) (101/78 - 116/59)  BP(mean): --  RR: 17 (31 Jul 2019 05:42) (17 - 17)  SpO2: 99% (31 Jul 2019 05:42) (95% - 100%)    I&O's Detail    PHYSICAL EXAM  NAD, awake and alert  Respirations nonlabored  Abdomen soft, mild tenderness over abdomen, nondistended  No guarding or rebound tenderness      Daily     Daily     LABS:                        7.5    9.98  )-----------( 203      ( 30 Jul 2019 06:38 )             23.0     07-31    140  |  100  |  6<L>  ----------------------------<  96  3.3<L>   |  26  |  0.67    Ca    9.3      31 Jul 2019 06:39  Phos  3.4     07-31  Mg     1.7     07-31    TPro  6.2  /  Alb  3.5  /  TBili  15.3<H>  /  DBili  x   /  AST  43<H>  /  ALT  49<H>  /  AlkPhos  81  07-31          RADIOLOGY & ADDITIONAL STUDIES:

## 2019-07-31 NOTE — PROGRESS NOTE ADULT - ASSESSMENT
38F hx hereditary spherocytosis admitted for imaging showing Mirizzi syndrome and pancreatitis     - f/u as outpt for quincy - discussed w/ Dr. Collado  - Pain control  - Zosyn  - downtrending T bili, monitor vitals  - f/u GI  - Rest of care per primary, please call w/ questions    B Surgery  [phone] with any questions

## 2019-07-31 NOTE — PROGRESS NOTE ADULT - PROBLEM SELECTOR PLAN 4
- acute on chronic hemolysis likely in setting of acute infection  - s/p 1 unit PRBC   - will check CBC q24 hours and transfuse as needed  - elevated bili in setting of hemolysis and acute quincy, resolving to baseline

## 2019-07-31 NOTE — DISCHARGE NOTE PROVIDER - NSDCFUADDAPPT_GEN_ALL_CORE_FT
1. Please follow up with the general surgery clinic upon discharge. You can ask to meet with Dr. Collado, the surgeon who was following you in the hospital.    2. Please follow up with your primary care provider, Dr. Rahman, within 7 days after discharge. 1. Please follow up with the general surgery clinic upon discharge. You will be called by the office to make an appointment. If you do not receive a call by Tuesday, please call the provided number.     2. Please follow up with your primary care provider, Dr. Rahman, within 7 days after discharge.

## 2019-07-31 NOTE — PROGRESS NOTE ADULT - SUBJECTIVE AND OBJECTIVE BOX
Luna Mast, PGY1  J [id number]  [phone]    Patient is a 38y old  Female who presents with a chief complaint of Hyperbilirubinemia (30 Jul 2019 10:39)      SUBJECTIVE / OVERNIGHT EVENTS:  Patient seen and examined at bedside.    Other Review of Systems Negative.    MEDICATIONS  (STANDING):  folic acid 1 milliGRAM(s) Oral daily  piperacillin/tazobactam IVPB.. 3.375 Gram(s) IV Intermittent every 8 hours    MEDICATIONS  (PRN):  HYDROmorphone  Injectable 0.5 milliGRAM(s) IV Push every 4 hours PRN Severe Pain (7 - 10)  ondansetron Injectable 4 milliGRAM(s) IV Push every 6 hours PRN Nausea and/or Vomiting    OBJECTIVE:  Vital Signs Last 24 Hrs  T(C): 37.1 (31 Jul 2019 05:42), Max: 37.1 (31 Jul 2019 05:42)  T(F): 98.8 (31 Jul 2019 05:42), Max: 98.8 (31 Jul 2019 05:42)  HR: 88 (31 Jul 2019 05:42) (88 - 97)  BP: 109/58 (31 Jul 2019 05:42) (101/78 - 116/59)  BP(mean): --  RR: 17 (31 Jul 2019 05:42) (17 - 17)  SpO2: 99% (31 Jul 2019 05:42) (95% - 100%)    PHYSICAL EXAM:  GENERAL: NAD, well-developed, laying in bed, speaking in full sentences  EENT: EOMI, PERRLA, scleral icterus, sublingual icterus  NECK: Supple, No JVD.   CHEST/LUNG: Clear to auscultation bilaterally; No wheezes  HEART: Regular rate and rhythm; No murmurs, rubs, or gallops  ABDOMEN: Soft, slightly distended, tender to palpation throughout, + BS, + rebound tenderness  EXTREMITIES: 2+ Peripheral Pulses, No clubbing, cyanosis, or edema  PSYCH: AAOx3  NEUROLOGY: non-focal  SKIN: No rashes or lesions    LABS:                        7.5    9.98  )-----------( 203      ( 30 Jul 2019 06:38 )             23.0     Auto Eosinophil # x     / Auto Eosinophil % x     / Auto Neutrophil # x     / Auto Neutrophil % x     / BANDS % x                            7.5    10.37 )-----------( 177      ( 29 Jul 2019 06:10 )             23.3     Auto Eosinophil # x     / Auto Eosinophil % x     / Auto Neutrophil # x     / Auto Neutrophil % x     / BANDS % x        07-30    137  |  100  |  8   ----------------------------<  97  3.5   |  25  |  0.52  07-29    137  |  101  |  6<L>  ----------------------------<  101<H>  3.4<L>   |  25  |  0.55    Ca    9.0      30 Jul 2019 06:38  Mg     1.8     07-30  Phos  3.9     07-30  TPro  6.1  /  Alb  3.4  /  TBili  21.7<H>  /  DBili  x   /  AST  16  /  ALT  31  /  AlkPhos  73  07-29    PT/INR - ( 29 Jul 2019 06:10 )   PT: 14.3 SEC;   INR: 1.28     PTT - ( 29 Jul 2019 06:10 )  PTT:29.2 SEC    Lactate, Blood: 0.7 mmol/L (07-26 @ 11:50)    Care Discussed with Consultants/Other Providers:    RADIOLOGY & ADDITIONAL TESTS:  (Imaging Personally Reviewed) Luna Mast, PGY1  J [id number]  [phone]    Patient is a 38y old  Female who presents with a chief complaint of Hyperbilirubinemia (30 Jul 2019 10:39)    SUBJECTIVE / OVERNIGHT EVENTS:  Patient seen and examined at bedside. Patient has continued c/o nausea, mild bloating, and lethargy. She was able to walk down the deng one way, but became too exhausted to walk back.    Other Review of Systems Negative.    MEDICATIONS  (STANDING):  folic acid 1 milliGRAM(s) Oral daily  piperacillin/tazobactam IVPB.. 3.375 Gram(s) IV Intermittent every 8 hours    MEDICATIONS  (PRN):  HYDROmorphone  Injectable 0.5 milliGRAM(s) IV Push every 4 hours PRN Severe Pain (7 - 10)  ondansetron Injectable 4 milliGRAM(s) IV Push every 6 hours PRN Nausea and/or Vomiting    OBJECTIVE:  Vital Signs Last 24 Hrs  T(C): 37.1 (31 Jul 2019 05:42), Max: 37.1 (31 Jul 2019 05:42)  T(F): 98.8 (31 Jul 2019 05:42), Max: 98.8 (31 Jul 2019 05:42)  HR: 88 (31 Jul 2019 05:42) (88 - 97)  BP: 109/58 (31 Jul 2019 05:42) (101/78 - 116/59)  BP(mean): --  RR: 17 (31 Jul 2019 05:42) (17 - 17)  SpO2: 99% (31 Jul 2019 05:42) (95% - 100%)    PHYSICAL EXAM:  GENERAL: NAD, well-developed, laying in bed, speaking in full sentences  EENT: EOMI, PERRLA, scleral icterus, sublingual icterus  NECK: Supple, No JVD.   CHEST/LUNG: Clear to auscultation bilaterally; No wheezes  HEART: Tachycardic, Regular rhythm; No murmurs, rubs, or gallops  ABDOMEN: Soft, slightly distended, tender to palpation throughout, + BS, + rebound tenderness  EXTREMITIES: 2+ Peripheral Pulses, No clubbing, cyanosis, or edema  PSYCH: AAOx3  NEUROLOGY: non-focal  SKIN: No rashes or lesions    LABS:                        7.5    9.98  )-----------( 203      ( 30 Jul 2019 06:38 )             23.0     Auto Eosinophil # x     / Auto Eosinophil % x     / Auto Neutrophil # x     / Auto Neutrophil % x     / BANDS % x                            7.5    10.37 )-----------( 177      ( 29 Jul 2019 06:10 )             23.3     Auto Eosinophil # x     / Auto Eosinophil % x     / Auto Neutrophil # x     / Auto Neutrophil % x     / BANDS % x        07-30    137  |  100  |  8   ----------------------------<  97  3.5   |  25  |  0.52  07-29    137  |  101  |  6<L>  ----------------------------<  101<H>  3.4<L>   |  25  |  0.55    Ca    9.0      30 Jul 2019 06:38  Mg     1.8     07-30  Phos  3.9     07-30  TPro  6.1  /  Alb  3.4  /  TBili  21.7<H>  /  DBili  x   /  AST  16  /  ALT  31  /  AlkPhos  73  07-29    PT/INR - ( 29 Jul 2019 06:10 )   PT: 14.3 SEC;   INR: 1.28     PTT - ( 29 Jul 2019 06:10 )  PTT:29.2 SEC    Lactate, Blood: 0.7 mmol/L (07-26 @ 11:50)    Care Discussed with Consultants/Other Providers:    RADIOLOGY & ADDITIONAL TESTS:  (Imaging Personally Reviewed)

## 2019-07-31 NOTE — PROGRESS NOTE ADULT - ASSESSMENT
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 Cholangiogram showing stricturing at [facility] but not fistula. Currently responding well to medical therapy. T bili of 33 on admission but now downtrending.  # Acute Pancreatitis  Lipase of >600 on admission. 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 Cholangiogram showing stricturing at [facility] but not fistula. Currently responding well to medical therapy. T bili of 33 on admission but now downtrending.  # Acute Pancreatitis  Lipase of >600 on admission. Most likely gallstone pancreatitis  #Spherocytosis    Recommendation:  - please check a lipase to ensure not having post-procedure pancreatitis   - repeat CMP, CBC, INR today - LFTs not checked yesterday  - followup with surgery re: timing of cholecystectomy if going to be done as outpatient or inpatient  - will need to be scheduled for repeat ERCP after cholecystectomy  - IV Abx while here  - continue IVF @ 200cc/hr  - continue zofran for nausea  - pain management as per primary team  - diet as tolerated

## 2019-07-31 NOTE — PROGRESS NOTE ADULT - PROBLEM SELECTOR PLAN 6
- AST 82->14, ->40   - Resolved  - c/w IVF as above - AST 82->14, ->40   - previously resolved, now up-trending  - c/w IVF as above

## 2019-07-31 NOTE — DISCHARGE NOTE PROVIDER - CARE PROVIDER_API CALL
Angela Rahman)  Family Medicine  [address] [place] [address]  Phone: [phone]  Fax: [phone]  Follow Up Time: Angela Rahman)  Family Medicine  [address] [place] [address]  Phone: [phone]  Fax: [phone]  Follow Up Time:     Angus Collado)  Surgery; Surgical Critical Care  [address] [place] [address]  Phone: [phone]  Fax: [phone]  Follow Up Time: 1 week

## 2019-07-31 NOTE — PROGRESS NOTE ADULT - PROBLEM SELECTOR PLAN 2
- Meets infectious workup for leukocytosis 21.39, tachycardic to the 130's with source presumed to be intraabdominal given her complaints and physical examination  - c/w zosyn for anaerobic and gram neg coverage (7/26 ->   - blood cultures with 1 bottle growing gram + rods with neg PCR, likely contaminant   - Repeat BCx NGTD  - Continue IVF LR 200cc/ hour

## 2019-07-31 NOTE — PROGRESS NOTE ADULT - ASSESSMENT
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 s/p ERCP with sphincterotomy and balloon sweep with removal of multiple stones. Plan for outpatient cholecystectomy. 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 s/p ERCP with sphincterotomy and balloon sweep with removal of multiple stones on 7/29. Plan for outpatient cholecystectomy.

## 2019-07-31 NOTE — DISCHARGE NOTE PROVIDER - NSFOLLOWUPCLINICS_GEN_ALL_ED_FT
[facility]  General Surgery  [address] [place] [address] - [address]rd [place] [address]  Phone: [phone]  Fax:   Follow Up Time:

## 2019-07-31 NOTE — DISCHARGE NOTE PROVIDER - NSDCCPCAREPLAN_GEN_ALL_CORE_FT
PRINCIPAL DISCHARGE DIAGNOSIS  Diagnosis: Mirizzi's syndrome  Assessment and Plan of Treatment: You had gallstones that obstructed your common bile duct and this, in turn, cause a PRINCIPAL DISCHARGE DIAGNOSIS  Diagnosis: Mirizzi's syndrome  Assessment and Plan of Treatment: You had pigmented gallstones that obstructed your cystic duct, causing cholecystitis. This in turn, caused an obstruction in your hepatic duct. You will need to follow up with general surgery regarding PRINCIPAL DISCHARGE DIAGNOSIS  Diagnosis: Mirizzi's syndrome  Assessment and Plan of Treatment: You had pigmented gallstones that obstructed your cystic duct, causing cholecystitis. This in turn, caused an obstruction in your hepatic duct. You will need to follow up with general surgery regarding a gallbladder removal. If you have worsening abdominal pain, please go to the ER.      SECONDARY DISCHARGE DIAGNOSES  Diagnosis: Acute pancreatitis  Assessment and Plan of Treatment: You had inflammation of your pancreas as a result of your gallstones. This is currently improving. After your gallbladder is removed, you will most likely need another stent placed by gastroenterology.    Diagnosis: Transaminitis  Assessment and Plan of Treatment: You had elevated liver enzymes upon admission into the hospital. This initially impoved, but is slowly increasing again. If you have worsening symptoms, please go to the ER.    Diagnosis: Hereditary spherocytosis  Assessment and Plan of Treatment: You had anemia that required a blood transfusion. Your anemia is stable at this point, but if you have symptoms of fatigue or dizziness, please contact a health care provider.

## 2019-07-31 NOTE — PROGRESS NOTE ADULT - PROBLEM SELECTOR PLAN 3
- Patient with two days of periumbilical abdominal pain radiating to her back   Meets  clinical criteria for pancreatitis 2/2 due to acute epigastric pain and Lipase>600. Cause Likely 2/2 to pigmented stones, given likely hemolysis of blood cells from HS  -c/w  IVF, Dilaudid .5 q 4 hours PRN, zofran 4 q 6 hours PRN - Patient with two days of periumbilical abdominal pain radiating to her back   Meets  clinical criteria for pancreatitis 2/2 due to acute epigastric pain and Lipase>600. Cause Likely 2/2 to pigmented stones, given likely hemolysis of blood cells from HS  -c/w  IVF, Dilaudid .5 q 4 hours PRN, zofran 4 q 6 hours PRN, Protonic daily

## 2019-08-01 DIAGNOSIS — K83.1 OBSTRUCTION OF BILE DUCT: ICD-10-CM

## 2019-08-01 LAB
ALBUMIN SERPL ELPH-MCNC: 3.5 G/DL — SIGNIFICANT CHANGE UP (ref 3.3–5)
ALP SERPL-CCNC: 82 U/L — SIGNIFICANT CHANGE UP (ref 40–120)
ALT FLD-CCNC: 60 U/L — HIGH (ref 4–33)
ANION GAP SERPL CALC-SCNC: 15 MMO/L — HIGH (ref 7–14)
ANISOCYTOSIS BLD QL: SIGNIFICANT CHANGE UP
ANISOCYTOSIS BLD QL: SIGNIFICANT CHANGE UP
APTT BLD: 28.5 SEC — SIGNIFICANT CHANGE UP (ref 27.5–36.3)
AST SERPL-CCNC: 48 U/L — HIGH (ref 4–32)
BACTERIA BLD CULT: SIGNIFICANT CHANGE UP
BACTERIA BLD CULT: SIGNIFICANT CHANGE UP
BASOPHILS # BLD AUTO: 0.03 K/UL — SIGNIFICANT CHANGE UP (ref 0–0.2)
BASOPHILS NFR BLD AUTO: 0.3 % — SIGNIFICANT CHANGE UP (ref 0–2)
BASOPHILS NFR SPEC: 0 % — SIGNIFICANT CHANGE UP (ref 0–2)
BASOPHILS NFR SPEC: 0 % — SIGNIFICANT CHANGE UP (ref 0–2)
BILIRUB SERPL-MCNC: 13.5 MG/DL — HIGH (ref 0.2–1.2)
BUN SERPL-MCNC: 6 MG/DL — LOW (ref 7–23)
BURR CELLS BLD QL SMEAR: PRESENT — SIGNIFICANT CHANGE UP
BURR CELLS BLD QL SMEAR: PRESENT — SIGNIFICANT CHANGE UP
CALCIUM SERPL-MCNC: 9.2 MG/DL — SIGNIFICANT CHANGE UP (ref 8.4–10.5)
CHLORIDE SERPL-SCNC: 101 MMOL/L — SIGNIFICANT CHANGE UP (ref 98–107)
CO2 SERPL-SCNC: 24 MMOL/L — SIGNIFICANT CHANGE UP (ref 22–31)
CREAT SERPL-MCNC: 0.63 MG/DL — SIGNIFICANT CHANGE UP (ref 0.5–1.3)
EOSINOPHIL # BLD AUTO: 0.19 K/UL — SIGNIFICANT CHANGE UP (ref 0–0.5)
EOSINOPHIL NFR BLD AUTO: 2.1 % — SIGNIFICANT CHANGE UP (ref 0–6)
EOSINOPHIL NFR FLD: 0 % — SIGNIFICANT CHANGE UP (ref 0–6)
EOSINOPHIL NFR FLD: 0 % — SIGNIFICANT CHANGE UP (ref 0–6)
GIANT PLATELETS BLD QL SMEAR: PRESENT — SIGNIFICANT CHANGE UP
GIANT PLATELETS BLD QL SMEAR: PRESENT — SIGNIFICANT CHANGE UP
GLUCOSE SERPL-MCNC: 109 MG/DL — HIGH (ref 70–99)
HCT VFR BLD CALC: 21.9 % — LOW (ref 34.5–45)
HCT VFR BLD CALC: 21.9 % — LOW (ref 34.5–45)
HGB BLD-MCNC: 7 G/DL — CRITICAL LOW (ref 11.5–15.5)
HGB BLD-MCNC: 7 G/DL — CRITICAL LOW (ref 11.5–15.5)
IMM GRANULOCYTES NFR BLD AUTO: 6.2 % — HIGH (ref 0–1.5)
INR BLD: 1.39 — HIGH (ref 0.88–1.17)
LYMPHOCYTES # BLD AUTO: 0.84 K/UL — LOW (ref 1–3.3)
LYMPHOCYTES # BLD AUTO: 9.4 % — LOW (ref 13–44)
LYMPHOCYTES NFR SPEC AUTO: 7 % — LOW (ref 13–44)
LYMPHOCYTES NFR SPEC AUTO: 7 % — LOW (ref 13–44)
MACROCYTES BLD QL: SLIGHT — SIGNIFICANT CHANGE UP
MACROCYTES BLD QL: SLIGHT — SIGNIFICANT CHANGE UP
MAGNESIUM SERPL-MCNC: 1.7 MG/DL — SIGNIFICANT CHANGE UP (ref 1.6–2.6)
MANUAL SMEAR VERIFICATION: SIGNIFICANT CHANGE UP
MANUAL SMEAR VERIFICATION: SIGNIFICANT CHANGE UP
MCHC RBC-ENTMCNC: 28.1 PG — SIGNIFICANT CHANGE UP (ref 27–34)
MCHC RBC-ENTMCNC: 28.1 PG — SIGNIFICANT CHANGE UP (ref 27–34)
MCHC RBC-ENTMCNC: 32 % — SIGNIFICANT CHANGE UP (ref 32–36)
MCHC RBC-ENTMCNC: 32 % — SIGNIFICANT CHANGE UP (ref 32–36)
MCV RBC AUTO: 88 FL — SIGNIFICANT CHANGE UP (ref 80–100)
MCV RBC AUTO: 88 FL — SIGNIFICANT CHANGE UP (ref 80–100)
MICROCYTES BLD QL: SLIGHT — SIGNIFICANT CHANGE UP
MICROCYTES BLD QL: SLIGHT — SIGNIFICANT CHANGE UP
MONOCYTES # BLD AUTO: 0.9 K/UL — SIGNIFICANT CHANGE UP (ref 0–0.9)
MONOCYTES NFR BLD AUTO: 10 % — SIGNIFICANT CHANGE UP (ref 2–14)
MONOCYTES NFR BLD: 6 % — SIGNIFICANT CHANGE UP (ref 2–9)
MONOCYTES NFR BLD: 6 % — SIGNIFICANT CHANGE UP (ref 2–9)
MYELOCYTES NFR BLD: 5 % — HIGH (ref 0–0)
MYELOCYTES NFR BLD: 5 % — HIGH (ref 0–0)
NEUTROPHIL AB SER-ACNC: 81 % — HIGH (ref 43–77)
NEUTROPHIL AB SER-ACNC: 81 % — HIGH (ref 43–77)
NEUTROPHILS # BLD AUTO: 6.45 K/UL — SIGNIFICANT CHANGE UP (ref 1.8–7.4)
NEUTROPHILS NFR BLD AUTO: 72 % — SIGNIFICANT CHANGE UP (ref 43–77)
NEUTS BAND # BLD: 1 % — SIGNIFICANT CHANGE UP (ref 0–6)
NEUTS BAND # BLD: 1 % — SIGNIFICANT CHANGE UP (ref 0–6)
NRBC # BLD: 3 /100WBC — SIGNIFICANT CHANGE UP
NRBC # BLD: 3 /100WBC — SIGNIFICANT CHANGE UP
NRBC # FLD: 0.39 K/UL — SIGNIFICANT CHANGE UP (ref 0–0)
NRBC # FLD: 0.39 K/UL — SIGNIFICANT CHANGE UP (ref 0–0)
NRBC FLD-RTO: 4.3 — SIGNIFICANT CHANGE UP
NRBC FLD-RTO: 4.3 — SIGNIFICANT CHANGE UP
PHOSPHATE SERPL-MCNC: 3.7 MG/DL — SIGNIFICANT CHANGE UP (ref 2.5–4.5)
PLATELET # BLD AUTO: 213 K/UL — SIGNIFICANT CHANGE UP (ref 150–400)
PLATELET # BLD AUTO: 213 K/UL — SIGNIFICANT CHANGE UP (ref 150–400)
PLATELET COUNT - ESTIMATE: NORMAL — SIGNIFICANT CHANGE UP
PLATELET COUNT - ESTIMATE: NORMAL — SIGNIFICANT CHANGE UP
PMV BLD: 10.9 FL — SIGNIFICANT CHANGE UP (ref 7–13)
PMV BLD: 10.9 FL — SIGNIFICANT CHANGE UP (ref 7–13)
POIKILOCYTOSIS BLD QL AUTO: SLIGHT — SIGNIFICANT CHANGE UP
POIKILOCYTOSIS BLD QL AUTO: SLIGHT — SIGNIFICANT CHANGE UP
POLYCHROMASIA BLD QL SMEAR: SLIGHT — SIGNIFICANT CHANGE UP
POLYCHROMASIA BLD QL SMEAR: SLIGHT — SIGNIFICANT CHANGE UP
POTASSIUM SERPL-MCNC: 3.2 MMOL/L — LOW (ref 3.5–5.3)
POTASSIUM SERPL-SCNC: 3.2 MMOL/L — LOW (ref 3.5–5.3)
PROT SERPL-MCNC: 5.9 G/DL — LOW (ref 6–8.3)
PROTHROM AB SERPL-ACNC: 16 SEC — HIGH (ref 9.8–13.1)
RBC # BLD: 2.49 M/UL — LOW (ref 3.8–5.2)
RBC # BLD: 2.49 M/UL — LOW (ref 3.8–5.2)
RBC # FLD: 23.2 % — HIGH (ref 10.3–14.5)
RBC # FLD: 23.2 % — HIGH (ref 10.3–14.5)
SODIUM SERPL-SCNC: 140 MMOL/L — SIGNIFICANT CHANGE UP (ref 135–145)
WBC # BLD: 9.06 K/UL — SIGNIFICANT CHANGE UP (ref 3.8–10.5)
WBC # BLD: 9.06 K/UL — SIGNIFICANT CHANGE UP (ref 3.8–10.5)
WBC # FLD AUTO: 9.06 K/UL — SIGNIFICANT CHANGE UP (ref 3.8–10.5)
WBC # FLD AUTO: 9.06 K/UL — SIGNIFICANT CHANGE UP (ref 3.8–10.5)

## 2019-08-01 PROCEDURE — 99233 SBSQ HOSP IP/OBS HIGH 50: CPT

## 2019-08-01 PROCEDURE — 99232 SBSQ HOSP IP/OBS MODERATE 35: CPT | Mod: GC

## 2019-08-01 RX ORDER — POLYETHYLENE GLYCOL 3350 17 G/17G
17 POWDER, FOR SOLUTION ORAL
Refills: 0 | Status: DISCONTINUED | OUTPATIENT
Start: 2019-08-01 | End: 2019-08-02

## 2019-08-01 RX ORDER — POTASSIUM CHLORIDE 20 MEQ
10 PACKET (EA) ORAL
Refills: 0 | Status: COMPLETED | OUTPATIENT
Start: 2019-08-01 | End: 2019-08-01

## 2019-08-01 RX ADMIN — Medication 1 MILLIGRAM(S): at 14:02

## 2019-08-01 RX ADMIN — Medication 100 MILLIEQUIVALENT(S): at 13:08

## 2019-08-01 RX ADMIN — HYDROMORPHONE HYDROCHLORIDE 0.5 MILLIGRAM(S): 2 INJECTION INTRAMUSCULAR; INTRAVENOUS; SUBCUTANEOUS at 22:02

## 2019-08-01 RX ADMIN — HYDROMORPHONE HYDROCHLORIDE 0.5 MILLIGRAM(S): 2 INJECTION INTRAMUSCULAR; INTRAVENOUS; SUBCUTANEOUS at 03:54

## 2019-08-01 RX ADMIN — PIPERACILLIN AND TAZOBACTAM 25 GRAM(S): 4; .5 INJECTION, POWDER, LYOPHILIZED, FOR SOLUTION INTRAVENOUS at 22:05

## 2019-08-01 RX ADMIN — Medication 100 MILLIEQUIVALENT(S): at 14:03

## 2019-08-01 RX ADMIN — HYDROMORPHONE HYDROCHLORIDE 0.5 MILLIGRAM(S): 2 INJECTION INTRAMUSCULAR; INTRAVENOUS; SUBCUTANEOUS at 22:17

## 2019-08-01 RX ADMIN — PIPERACILLIN AND TAZOBACTAM 25 GRAM(S): 4; .5 INJECTION, POWDER, LYOPHILIZED, FOR SOLUTION INTRAVENOUS at 05:40

## 2019-08-01 RX ADMIN — HYDROMORPHONE HYDROCHLORIDE 0.5 MILLIGRAM(S): 2 INJECTION INTRAMUSCULAR; INTRAVENOUS; SUBCUTANEOUS at 03:38

## 2019-08-01 RX ADMIN — SODIUM CHLORIDE 200 MILLILITER(S): 9 INJECTION, SOLUTION INTRAVENOUS at 03:38

## 2019-08-01 RX ADMIN — PANTOPRAZOLE SODIUM 40 MILLIGRAM(S): 20 TABLET, DELAYED RELEASE ORAL at 06:58

## 2019-08-01 RX ADMIN — PIPERACILLIN AND TAZOBACTAM 25 GRAM(S): 4; .5 INJECTION, POWDER, LYOPHILIZED, FOR SOLUTION INTRAVENOUS at 18:32

## 2019-08-01 RX ADMIN — Medication 100 MILLIEQUIVALENT(S): at 11:02

## 2019-08-01 NOTE — PROGRESS NOTE ADULT - PROBLEM SELECTOR PLAN 6
- AST 82->14, ->40   - previously resolved, now up-trending  - c/w IVF as above DVT PPX: improve score of .4 will encourage ambulation   Diet: Regular  Dispo: likely home, no needs

## 2019-08-01 NOTE — PROGRESS NOTE ADULT - PROBLEM SELECTOR PLAN 1
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 to chronic gallstones leading to biliary stasis and chronic inflammation)  - surgery recs appreciated, plan for outpatient cholecystectomy  - GI recs appreciated, aggressive IVF, IV abx, will need to be scheduled for repeat ERCP after cholecystectomy  - doppler US neg for hepatic vein thrombosis on 7/29  - abx as below 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 to chronic gallstones leading to biliary stasis and chronic inflammation)  - surgery recs appreciated, plan for outpatient cholecystectomy   - GI recs appreciated, aggressive IVF, IV abx, will need to be scheduled for repeat ERCP after cholecystectomy  - doppler US neg for hepatic vein thrombosis on 7/29  - abx as below 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 to chronic gallstones leading to biliary stasis and chronic inflammation)  - doppler US neg for hepatic vein thrombosis on 7/29  - surgery recs appreciated, plan for outpatient cholecystectomy   - GI recs appreciated, IV abx to PO when discharged, will need to be scheduled for repeat ERCP after cholecystectomy  - c/w zosyn for anaerobic and gram neg coverage (7/26 ->

## 2019-08-01 NOTE — PROGRESS NOTE ADULT - ASSESSMENT
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 s/p ERCP with sphincterotomy and balloon sweep with removal of multiple stones on 7/29. Plan for outpatient cholecystectomy. 38F PMHx hereditary spherocytosis (diagnosed at [facility] 26 years ago), who presents with abdominal pain, hyperbilirubinemia admitted for sepsis d/t acute pancreatitis and acute cholecystitis with Mirizzi syndrome in the setting of pigmented gallstones 2/2 to chronic hemolysis from hereditary spherocytosis. s/p ERCP with sphincterotomy and balloon sweep with removal of multiple stones on 7/29. Plan for outpatient cholecystectomy.

## 2019-08-01 NOTE — PROGRESS NOTE ADULT - ASSESSMENT
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 Cholangiogram showing stricturing at [facility] but not fistula. Currently responding well to medical therapy. T bili of 33 on admission but now downtrending.  # Acute Pancreatitis  Lipase of >600 on admission. Most likely gallstone pancreatitis  #Spherocytosis    Recommendation:  - please check AM lipase and amylase to eval for post-ERCP pancreatitis  - check AM CBC, CMP to eval LFTs  - continue IVF  - advance diet as tolerated  - nausea management as needed per primary team  - bowel regimen; would given Miralax 1-2 times daily  - IV antibiotics while here, would complete 5 day course total (can switch to PO as outpatient)  - followup with surgery with timing of outpatient cholecystectomy, patient concerned given busy upcoming schedule  - will need to be scheduled for repeat ERCP after cholecystectomy

## 2019-08-01 NOTE — PROGRESS NOTE ADULT - PROBLEM SELECTOR PLAN 2
- Meets infectious workup for leukocytosis 21.39, tachycardic to the 130's with source presumed to be intraabdominal given her complaints and physical examination  - c/w zosyn for anaerobic and gram neg coverage (7/26 ->   - blood cultures with 1 bottle growing gram + rods with neg PCR, likely contaminant   - Repeat BCx NGTD  - Continue IVF LR 100cc/ hour - Meets infectious workup for leukocytosis 21.39, tachycardic to the 130's with source presumed to be intraabdominal given her complaints and physical examination  - c/w zosyn for anaerobic and gram neg coverage (7/26 ->   - blood cultures with 1 bottle growing gram + rods with neg PCR, likely contaminant   - Repeat BCx NGTD  - Continue IVF LR 200cc/ hour - Meets infectious workup for leukocytosis 21.39, tachycardic to the 130's with source presumed to be intraabdominal given her complaints and physical examination  - blood cultures with 1 bottle growing gram + rods with neg PCR, likely contaminant, Repeat BCx NGTD  - s/p IVF LR 200cc/ hour  - abx as above

## 2019-08-01 NOTE — PROGRESS NOTE ADULT - PROBLEM SELECTOR PLAN 4
- acute on chronic hemolysis likely in setting of acute infection  - s/p 1 unit PRBC   - will check CBC q24 hours and transfuse as needed  - elevated bili in setting of hemolysis and acute quincy, resolving to baseline - acute on chronic hemolysis likely in setting of acute infection  - folate normal, hapto low, and LDH elevated. Hapto and LDH 96% specific for hemolysis, Retic Count % elevated.   - elevated bili in setting of hemolysis and acute quincy, resolved to baseline  - 1u pRBC given 7/26  - transfuse as needed to maintain Hb >7  - c/w home folic acid  - Active T&S - acute on chronic hemolysis likely in setting of acute infection  - folate normal, hapto low, and LDH elevated. Hapto and LDH 96% specific for hemolysis, Retic Count % elevated.   - elevated bili in setting of hemolysis and acute quincy, resolved to baseline  - s/p 1u pRBC   - transfuse as needed to maintain Hb >7  - c/w home folic acid  - Active T&S

## 2019-08-01 NOTE — PROGRESS NOTE ADULT - PROBLEM SELECTOR PLAN 5
- Bilirubin 39.1, likely 2/2 to hemolysis given underlying HS and acute quincy  - Resolving to baseline  - c/t trend (39--> 21.7); last labs from 2015 at which time bili= 24 - AST 82->14, ->40   - previously resolved, now up-trending  - c/w IVF as above - previously resolved, now up-trending  - s/p IVF as above - previously resolved, now up-trending again  - encourage PO intake  - s/p IVF as above

## 2019-08-01 NOTE — PROGRESS NOTE ADULT - PROBLEM SELECTOR PLAN 3
- Patient with two days of periumbilical abdominal pain radiating to her back   Meets  clinical criteria for pancreatitis 2/2 due to acute epigastric pain and Lipase>600. Cause Likely 2/2 to pigmented stones, given likely hemolysis of blood cells from HS  -c/w  IVF, Dilaudid .5 q 4 hours PRN, zofran 4 q 6 hours PRN, Protonic daily - Patient with two days of periumbilical abdominal pain radiating to her back   Meets  clinical criteria for pancreatitis 2/2 due to acute epigastric pain and Lipase>600. Cause Likely 2/2 to pigmented stones, given likely hemolysis of blood cells from HS  -c/w  IVF, Dilaudid .5 q 4 hours PRN, zofran 4 q 6 hours PRN, Protonix daily - Patient with two days of periumbilical abdominal pain radiating to her back   Meets  clinical criteria for pancreatitis 2/2 due to acute epigastric pain and Lipase>600. Cause Likely 2/2 to pigmented stones, given likely hemolysis of blood cells from HS  -c/w  IVF, Dilaudid .5 q 4 hours PRN, zofran 4 q 6 hours PRN, Protonix daily  - amylase/lipase pending

## 2019-08-01 NOTE — PROGRESS NOTE ADULT - SUBJECTIVE AND OBJECTIVE BOX
Chief Complaint:  Patient is a 38y old  Female who presents with a chief complaint of Hyperbilirubinemia (01 Aug 2019 05:39)      Interval Events: Pt still reports nausea.  Per surgery, outpatient cholecystectomy.  TB continues to trend down.     Allergies:  No Known Allergies      Hospital Medications:  folic acid 1 milliGRAM(s) Oral daily  HYDROmorphone  Injectable 0.5 milliGRAM(s) IV Push every 4 hours PRN  ondansetron Injectable 4 milliGRAM(s) IV Push every 6 hours PRN  pantoprazole    Tablet 40 milliGRAM(s) Oral before breakfast  piperacillin/tazobactam IVPB.. 3.375 Gram(s) IV Intermittent every 8 hours      PMHX/PSHX:  IUP (intrauterine pregnancy), incidental  Mucous polyp of cervix  Hereditary spherocytosis   history      Family history:  Family history of hypertension  Family history of hereditary spherocytosis      ROS:     General:  No wt loss, fevers, chills, night sweats, fatigue,   Eyes:  Good vision, no reported pain  ENT:  No sore throat, pain, runny nose, dysphagia  CV:  No pain, palpitations, hypo/hypertension  Resp:  No dyspnea, cough, tachypnea, wheezing  GI:  See HPI  :  No pain, bleeding, incontinence, nocturia  Muscle:  No pain, weakness  Neuro:  No weakness, tingling, memory problems  Psych:  No fatigue, insomnia, mood problems, depression  Endocrine:  No polyuria, polydipsia, cold/heat intolerance  Heme:  No petechiae, ecchymosis, easy bruisability  Skin:  No rash, edema      PHYSICAL EXAM:     GENERAL: NAD  HEENT:  NC/AT  CHEST:  Full & symmetric excursion, no increased effort  ABDOMEN:  Soft, non-tender, non-distended, +BS  EXTREMITIES:  no edema  SKIN:  No rash  NEURO:  Alert      Vital Signs:  Vital Signs Last 24 Hrs  T(C): 36.7 (01 Aug 2019 05:38), Max: 37.2 (2019 21:11)  T(F): 98.1 (01 Aug 2019 05:38), Max: 99 (2019 21:11)  HR: 78 (01 Aug 2019 05:38) (78 - 90)  BP: 112/70 (01 Aug 2019 05:38) (112/70 - 120/79)  BP(mean): --  RR: 18 (01 Aug 2019 05:38) (16 - 18)  SpO2: 97% (01 Aug 2019 05:38) (97% - 100%)  Daily     Daily     LABS:                        7.4    8.47  )-----------( 205      ( 2019 06:39 )             23.3         140  |  100  |  6<L>  ----------------------------<  96  3.3<L>   |  26  |  0.67    Ca    9.3      2019 06:39  Phos  3.4       Mg     1.7         TPro  6.2  /  Alb  3.5  /  TBili  15.3<H>  /  DBili  x   /  AST  43<H>  /  ALT  49<H>  /  AlkPhos  81      LIVER FUNCTIONS - ( 2019 06:39 )  Alb: 3.5 g/dL / Pro: 6.2 g/dL / ALK PHOS: 81 u/L / ALT: 49 u/L / AST: 43 u/L / GGT: x                   Imaging:  reviewed

## 2019-08-02 ENCOUNTER — TRANSCRIPTION ENCOUNTER (OUTPATIENT)
Age: 39
End: 2019-08-02

## 2019-08-02 VITALS
HEART RATE: 78 BPM | SYSTOLIC BLOOD PRESSURE: 102 MMHG | OXYGEN SATURATION: 100 % | RESPIRATION RATE: 16 BRPM | DIASTOLIC BLOOD PRESSURE: 65 MMHG | TEMPERATURE: 99 F

## 2019-08-02 LAB
ALBUMIN SERPL ELPH-MCNC: 3.8 G/DL — SIGNIFICANT CHANGE UP (ref 3.3–5)
ALP SERPL-CCNC: 82 U/L — SIGNIFICANT CHANGE UP (ref 40–120)
ALT FLD-CCNC: 75 U/L — HIGH (ref 4–33)
AMYLASE P1 CFR SERPL: 200 U/L — HIGH (ref 25–125)
ANION GAP SERPL CALC-SCNC: 13 MMO/L — SIGNIFICANT CHANGE UP (ref 7–14)
AST SERPL-CCNC: 55 U/L — HIGH (ref 4–32)
BILIRUB SERPL-MCNC: 12.2 MG/DL — HIGH (ref 0.2–1.2)
BLD GP AB SCN SERPL QL: NEGATIVE — SIGNIFICANT CHANGE UP
BUN SERPL-MCNC: 6 MG/DL — LOW (ref 7–23)
CALCIUM SERPL-MCNC: 9.3 MG/DL — SIGNIFICANT CHANGE UP (ref 8.4–10.5)
CHLORIDE SERPL-SCNC: 103 MMOL/L — SIGNIFICANT CHANGE UP (ref 98–107)
CO2 SERPL-SCNC: 25 MMOL/L — SIGNIFICANT CHANGE UP (ref 22–31)
CREAT SERPL-MCNC: 0.63 MG/DL — SIGNIFICANT CHANGE UP (ref 0.5–1.3)
GLUCOSE SERPL-MCNC: 97 MG/DL — SIGNIFICANT CHANGE UP (ref 70–99)
HCT VFR BLD CALC: 24.8 % — LOW (ref 34.5–45)
HGB BLD-MCNC: 7.8 G/DL — LOW (ref 11.5–15.5)
LIDOCAIN IGE QN: 418.5 U/L — HIGH (ref 7–60)
MAGNESIUM SERPL-MCNC: 1.9 MG/DL — SIGNIFICANT CHANGE UP (ref 1.6–2.6)
MCHC RBC-ENTMCNC: 27.5 PG — SIGNIFICANT CHANGE UP (ref 27–34)
MCHC RBC-ENTMCNC: 31.5 % — LOW (ref 32–36)
MCV RBC AUTO: 87.3 FL — SIGNIFICANT CHANGE UP (ref 80–100)
NRBC # FLD: 0.39 K/UL — SIGNIFICANT CHANGE UP (ref 0–0)
NRBC FLD-RTO: 4 — SIGNIFICANT CHANGE UP
PHOSPHATE SERPL-MCNC: 4.4 MG/DL — SIGNIFICANT CHANGE UP (ref 2.5–4.5)
PLATELET # BLD AUTO: 230 K/UL — SIGNIFICANT CHANGE UP (ref 150–400)
PMV BLD: 10.1 FL — SIGNIFICANT CHANGE UP (ref 7–13)
POTASSIUM SERPL-MCNC: 3.3 MMOL/L — LOW (ref 3.5–5.3)
POTASSIUM SERPL-SCNC: 3.3 MMOL/L — LOW (ref 3.5–5.3)
PROT SERPL-MCNC: 6.5 G/DL — SIGNIFICANT CHANGE UP (ref 6–8.3)
RBC # BLD: 2.84 M/UL — LOW (ref 3.8–5.2)
RBC # FLD: 23.9 % — HIGH (ref 10.3–14.5)
RH IG SCN BLD-IMP: POSITIVE — SIGNIFICANT CHANGE UP
SODIUM SERPL-SCNC: 141 MMOL/L — SIGNIFICANT CHANGE UP (ref 135–145)
WBC # BLD: 9.79 K/UL — SIGNIFICANT CHANGE UP (ref 3.8–10.5)
WBC # FLD AUTO: 9.79 K/UL — SIGNIFICANT CHANGE UP (ref 3.8–10.5)

## 2019-08-02 PROCEDURE — 99232 SBSQ HOSP IP/OBS MODERATE 35: CPT | Mod: GC

## 2019-08-02 PROCEDURE — 99239 HOSP IP/OBS DSCHRG MGMT >30: CPT

## 2019-08-02 RX ORDER — ONDANSETRON 8 MG/1
4 TABLET, FILM COATED ORAL EVERY 6 HOURS
Refills: 0 | Status: DISCONTINUED | OUTPATIENT
Start: 2019-08-02 | End: 2019-08-02

## 2019-08-02 RX ORDER — HYDROMORPHONE HYDROCHLORIDE 2 MG/ML
1 INJECTION INTRAMUSCULAR; INTRAVENOUS; SUBCUTANEOUS
Qty: 8 | Refills: 0
Start: 2019-08-02 | End: 2019-08-05

## 2019-08-02 RX ORDER — CIPROFLOXACIN LACTATE 400MG/40ML
500 VIAL (ML) INTRAVENOUS EVERY 12 HOURS
Refills: 0 | Status: DISCONTINUED | OUTPATIENT
Start: 2019-08-02 | End: 2019-08-02

## 2019-08-02 RX ORDER — PANTOPRAZOLE SODIUM 20 MG/1
1 TABLET, DELAYED RELEASE ORAL
Qty: 7 | Refills: 0
Start: 2019-08-02 | End: 2019-08-08

## 2019-08-02 RX ORDER — METRONIDAZOLE 500 MG
500 TABLET ORAL EVERY 8 HOURS
Refills: 0 | Status: DISCONTINUED | OUTPATIENT
Start: 2019-08-02 | End: 2019-08-02

## 2019-08-02 RX ORDER — HYDROMORPHONE HYDROCHLORIDE 2 MG/ML
2 INJECTION INTRAMUSCULAR; INTRAVENOUS; SUBCUTANEOUS EVERY 4 HOURS
Refills: 0 | Status: DISCONTINUED | OUTPATIENT
Start: 2019-08-02 | End: 2019-08-02

## 2019-08-02 RX ORDER — POLYETHYLENE GLYCOL 3350 17 G/17G
17 POWDER, FOR SOLUTION ORAL
Qty: 0 | Refills: 0 | DISCHARGE
Start: 2019-08-02

## 2019-08-02 RX ORDER — ONDANSETRON 8 MG/1
1 TABLET, FILM COATED ORAL
Qty: 21 | Refills: 0
Start: 2019-08-02 | End: 2019-08-08

## 2019-08-02 RX ORDER — POTASSIUM CHLORIDE 20 MEQ
40 PACKET (EA) ORAL EVERY 4 HOURS
Refills: 0 | Status: COMPLETED | OUTPATIENT
Start: 2019-08-02 | End: 2019-08-02

## 2019-08-02 RX ADMIN — Medication 1 MILLIGRAM(S): at 13:26

## 2019-08-02 RX ADMIN — HYDROMORPHONE HYDROCHLORIDE 0.5 MILLIGRAM(S): 2 INJECTION INTRAMUSCULAR; INTRAVENOUS; SUBCUTANEOUS at 05:55

## 2019-08-02 RX ADMIN — PIPERACILLIN AND TAZOBACTAM 25 GRAM(S): 4; .5 INJECTION, POWDER, LYOPHILIZED, FOR SOLUTION INTRAVENOUS at 05:57

## 2019-08-02 RX ADMIN — HYDROMORPHONE HYDROCHLORIDE 0.5 MILLIGRAM(S): 2 INJECTION INTRAMUSCULAR; INTRAVENOUS; SUBCUTANEOUS at 06:10

## 2019-08-02 RX ADMIN — PANTOPRAZOLE SODIUM 40 MILLIGRAM(S): 20 TABLET, DELAYED RELEASE ORAL at 06:12

## 2019-08-02 NOTE — DIETITIAN INITIAL EVALUATION ADULT. - PROBLEM SELECTOR PLAN 1
Meets infectious workup for leukocytosis 21.39, tachycardic to the 130's with source presumed to be intraabdominal given her complaints and physical examination  Will attain blood cultures X2- to be followed  Will treat with broad spectrum IV abx with cefepime and metronidazole for both gram negative coverage and anaerobic coverage   Continue IVF LR  100cc/ hour

## 2019-08-02 NOTE — DIETITIAN INITIAL EVALUATION ADULT. - PROBLEM SELECTOR PLAN 3
Bilirubin 39.1 , likely 2/2 to hemolysis given underlying HS.  Bilirubin found in urine, which explains patients " bloody urine" this past Thursday  However given that the patient meets sepsis criteria, cannot exclude acute cholangitis vs choledocholithiasis or CBD dilatation without further imaging vs necrotic pancreatitis  Will do RUQ US to evaluate CBD dilation, Will  further evaluate with MR Abdomen to rule out CBD obstruction  Will cover empirically with cefepime and metronidazole for severe intra-abdominal infection

## 2019-08-02 NOTE — PROGRESS NOTE ADULT - PROBLEM SELECTOR PLAN 3
- Patient with two days of periumbilical abdominal pain radiating to her back   Meets  clinical criteria for pancreatitis 2/2 due to acute epigastric pain and Lipase>600. Cause Likely 2/2 to pigmented stones, given likely hemolysis of blood cells from HS  -c/w  IVF, Dilaudid .5 q 4 hours PRN, zofran 4 q 6 hours PRN, Protonix daily  - amylase/lipase pending - Patient with two days of periumbilical abdominal pain radiating to her back   Meets  clinical criteria for pancreatitis 2/2 due to acute epigastric pain and Lipase>600. Cause Likely 2/2 to pigmented stones, given likely hemolysis of blood cells from HS  -c/w  IVF, Dilaudid 2mg PO q 4 hours PRN, zofran 4mg PO q 6 hours PRN, Protonix daily  - amylase 200, lipase 418.5

## 2019-08-02 NOTE — DISCHARGE NOTE NURSING/CASE MANAGEMENT/SOCIAL WORK - NSDCPNINST_GEN_ALL_CORE
Ms licona is stable, improved , alert and oriented x 4, self sufficient with her care; her eyes are still jaundiced; Abdominal pain is intermittent, cramping but now pain free.  She did have the ERCP on the 7/27/2019 and gall Stones were removed and she will have Cholecystectomy as out Patient .  Her Potassium was 3.3 and is replete; vital signs are stable  and the Right side Abdominal pain  at the Umbilical area has also resolved .  Discharge Instructions are discussed and provided.

## 2019-08-02 NOTE — PROGRESS NOTE ADULT - ASSESSMENT
38F hx hereditary spherocytosis admitted for imaging showing Mirizzi syndrome and pancreatitis     - f/u as outpt for quincy - discussed w/ Dr. Collado  - Pain control  - does not need abx on discahrge  - downtrending T bili, monitor vitals  - f/u GI  - Rest of care per primary, please call w/ questions    B Surgery  [id number] with any questions 38F hx hereditary spherocytosis admitted for imaging showing Mirizzi syndrome and pancreatitis     - f/u as outpt for quincy - discussed w/ Dr. Collado  - Pain control  - does not need abx on discharge as pt has had zosyn since admission  - downtrending T bili, monitor vitals  - f/u GI  - Rest of care per primary, please call w/ questions    B Surgery  [id number] with any questions 38F hx hereditary spherocytosis admitted for imaging showing Mirizzi syndrome and pancreatitis     - f/u as outpt for quincy - discussed w/ Dr. Collado  - Pain control  - does not need abx on discharge as pt has had zosyn since admission  - downtrending T bili, monitor vitals  - f/u GI  - Rest of care per primary, please call w/ questions    Will sign off for now, please page w/ further questions    B Surgery  [id number] with any questions

## 2019-08-02 NOTE — PROGRESS NOTE ADULT - SUBJECTIVE AND OBJECTIVE BOX
GENERAL SURGERY DAILY PROGRESS NOTE:     Subjective:  Pt seen and examined. No acute events overnight. Reports continued abdominal cramping. Denies n/v. Tolerating herdiet.     Objective:    MEDICATIONS  (STANDING):  folic acid 1 milliGRAM(s) Oral daily  pantoprazole    Tablet 40 milliGRAM(s) Oral before breakfast  potassium chloride    Tablet ER 40 milliEquivalent(s) Oral every 4 hours    MEDICATIONS  (PRN):  HYDROmorphone   Tablet 2 milliGRAM(s) Oral every 4 hours PRN Severe Pain (7 - 10)  ondansetron   Disintegrating Tablet 4 milliGRAM(s) Oral every 6 hours PRN Nausea and/or Vomiting  polyethylene glycol 3350 17 Gram(s) Oral two times a day PRN Constipation      Vital Signs Last 24 Hrs  T(C): 36.7 (02 Aug 2019 06:09), Max: 36.7 (02 Aug 2019 02:06)  T(F): 98 (02 Aug 2019 06:09), Max: 98.1 (02 Aug 2019 02:06)  HR: 75 (02 Aug 2019 06:09) (75 - 90)  BP: 110/64 (02 Aug 2019 06:09) (105/59 - 111/62)  BP(mean): --  RR: 18 (02 Aug 2019 06:09) (18 - 18)  SpO2: 100% (02 Aug 2019 06:09) (97% - 100%)    I&O's Detail    PHYSICAL EXAM  NAD, awake and alert  Respirations nonlabored  Abdomen soft, mildly tender throughout, nondistended  No guarding or rebound tenderness      Daily     Daily Weight in k.8 (02 Aug 2019 11:28)    LABS:                        7.8    9.79  )-----------( 230      ( 02 Aug 2019 06:00 )             24.8     08-02    141  |  103  |  6<L>  ----------------------------<  97  3.3<L>   |  25  |  0.63    Ca    9.3      02 Aug 2019 06:00  Phos  4.4     08-02  Mg     1.9     0802    TPro  6.5  /  Alb  3.8  /  TBili  12.2<H>  /  DBili  x   /  AST  55<H>  /  ALT  75<H>  /  AlkPhos  82  08-02    PT/INR - ( 01 Aug 2019 07:30 )   PT: 16.0 SEC;   INR: 1.39          PTT - ( 01 Aug 2019 07:30 )  PTT:28.5 SEC      RADIOLOGY & ADDITIONAL STUDIES:

## 2019-08-02 NOTE — DIETITIAN INITIAL EVALUATION ADULT. - PERTINENT MEDS FT
MEDICATIONS  (STANDING):  ciprofloxacin     Tablet 500 milliGRAM(s) Oral every 12 hours  folic acid 1 milliGRAM(s) Oral daily  metroNIDAZOLE    Tablet 500 milliGRAM(s) Oral every 8 hours  pantoprazole    Tablet 40 milliGRAM(s) Oral before breakfast  potassium chloride    Tablet ER 40 milliEquivalent(s) Oral every 4 hours

## 2019-08-02 NOTE — PROGRESS NOTE ADULT - SUBJECTIVE AND OBJECTIVE BOX
Luna Mast, PGY1  J [id number]  [phone]    Patient is a 38y old  Female who presents with a chief complaint of Hyperbilirubinemia (01 Aug 2019 06:25)    SUBJECTIVE / OVERNIGHT EVENTS:  Patient seen and examined at bedside.    Other Review of Systems Negative.    MEDICATIONS  (STANDING):  folic acid 1 milliGRAM(s) Oral daily  pantoprazole    Tablet 40 milliGRAM(s) Oral before breakfast  piperacillin/tazobactam IVPB.. 3.375 Gram(s) IV Intermittent every 8 hours    MEDICATIONS  (PRN):  HYDROmorphone  Injectable 0.5 milliGRAM(s) IV Push every 4 hours PRN Severe Pain (7 - 10)  ondansetron Injectable 4 milliGRAM(s) IV Push every 6 hours PRN Nausea and/or Vomiting  polyethylene glycol 3350 17 Gram(s) Oral two times a day PRN Constipation    OBJECTIVE:  Vital Signs Last 24 Hrs  T(C): 36.7 (02 Aug 2019 02:06), Max: 36.7 (02 Aug 2019 02:06)  T(F): 98.1 (02 Aug 2019 02:06), Max: 98.1 (02 Aug 2019 02:06)  HR: 78 (02 Aug 2019 02:06) (78 - 90)  BP: 171/62 (02 Aug 2019 02:06) (105/59 - 171/62)  BP(mean): --  RR: 18 (02 Aug 2019 02:06) (18 - 18)  SpO2: 99% (02 Aug 2019 02:06) (97% - 100%)    I&O's Summary  31 Jul 2019 07:01  -  01 Aug 2019 07:00  --------------------------------------------------------  IN: 900 mL / OUT: 0 mL / NET: 900 mL    PHYSICAL EXAM:  GENERAL: NAD, well-developed, laying in bed, speaking in full sentences  EENT: EOMI, PERRLA, scleral icterus, sublingual icterus  NECK: Supple, No JVD.   CHEST/LUNG: Clear to auscultation bilaterally; No wheezes  HEART: Tachycardic, Regular rhythm; No murmurs, rubs, or gallops  ABDOMEN: Soft, more distended than before, tender to palpation throughout, + BS, + rebound tenderness  EXTREMITIES: 2+ Peripheral Pulses, No clubbing, cyanosis, or edema  PSYCH: AAOx3  NEUROLOGY: non-focal  SKIN: No rashes or     LABS:                        7.0    9.06  )-----------( 213      ( 01 Aug 2019 07:30 )             21.9     Auto Eosinophil # 0.19  / Auto Eosinophil % 2.1   / Auto Neutrophil # 6.45  / Auto Neutrophil % 72.0  / BANDS % 1.0                          7.4    8.47  )-----------( 205      ( 31 Jul 2019 06:39 )             23.3     Auto Eosinophil # x     / Auto Eosinophil % x     / Auto Neutrophil # x     / Auto Neutrophil % x     / BANDS % x        08-01    140  |  101  |  6<L>  ----------------------------<  109<H>  3.2<L>   |  24  |  0.63  07-31    140  |  100  |  6<L>  ----------------------------<  96  3.3<L>   |  26  |  0.67    Ca    9.2      01 Aug 2019 07:30  Mg     1.7     08-01  Phos  3.7     08-01  TPro  5.9<L>  /  Alb  3.5  /  TBili  13.5<H>  /  DBili  x   /  AST  48<H>  /  ALT  60<H>  /  AlkPhos  82  08-01  TPro  6.2  /  Alb  3.5  /  TBili  15.3<H>  /  DBili  x   /  AST  43<H>  /  ALT  49<H>  /  AlkPhos  81  07-31    PT/INR - ( 01 Aug 2019 07:30 )   PT: 16.0 SEC;   INR: 1.39   PTT - ( 01 Aug 2019 07:30 )  PTT:28.5 SEC    Lactate, Blood: 0.7 mmol/L (07-26 @ 11:50)    Care Discussed with Consultants/Other Providers:    RADIOLOGY & ADDITIONAL TESTS:  (Imaging Personally Reviewed) Luna Mast, PGY1  J [id number]  [phone]    Patient is a 38y old  Female who presents with a chief complaint of Hyperbilirubinemia (01 Aug 2019 06:25)    SUBJECTIVE / OVERNIGHT EVENTS:  Patient seen and examined at bedside. REGINA. Ate better yesterday. Denies vomiting. Less nausea and bloating today.     Other Review of Systems Negative.    MEDICATIONS  (STANDING):  folic acid 1 milliGRAM(s) Oral daily  pantoprazole    Tablet 40 milliGRAM(s) Oral before breakfast  piperacillin/tazobactam IVPB.. 3.375 Gram(s) IV Intermittent every 8 hours    MEDICATIONS  (PRN):  HYDROmorphone  Injectable 0.5 milliGRAM(s) IV Push every 4 hours PRN Severe Pain (7 - 10)  ondansetron Injectable 4 milliGRAM(s) IV Push every 6 hours PRN Nausea and/or Vomiting  polyethylene glycol 3350 17 Gram(s) Oral two times a day PRN Constipation    OBJECTIVE:  Vital Signs Last 24 Hrs  T(C): 36.7 (02 Aug 2019 02:06), Max: 36.7 (02 Aug 2019 02:06)  T(F): 98.1 (02 Aug 2019 02:06), Max: 98.1 (02 Aug 2019 02:06)  HR: 78 (02 Aug 2019 02:06) (78 - 90)  BP: 171/62 (02 Aug 2019 02:06) (105/59 - 171/62)  BP(mean): --  RR: 18 (02 Aug 2019 02:06) (18 - 18)  SpO2: 99% (02 Aug 2019 02:06) (97% - 100%)    I&O's Summary  31 Jul 2019 07:01  -  01 Aug 2019 07:00  --------------------------------------------------------  IN: 900 mL / OUT: 0 mL / NET: 900 mL    PHYSICAL EXAM:  GENERAL: NAD, well-developed, laying in bed, speaking in full sentences  EENT: EOMI, PERRLA, scleral icterus, sublingual icterus  NECK: Supple, No JVD.   CHEST/LUNG: Clear to auscultation bilaterally; No wheezes  HEART: Tachycardic, Regular rhythm; No murmurs, rubs, or gallops  ABDOMEN: Soft, more distended than before, tender to palpation throughout, + BS, + rebound tenderness  EXTREMITIES: 2+ Peripheral Pulses, No clubbing, cyanosis, or edema  PSYCH: AAOx3  NEUROLOGY: non-focal  SKIN: No rashes or     LABS:                        7.0    9.06  )-----------( 213      ( 01 Aug 2019 07:30 )             21.9     Auto Eosinophil # 0.19  / Auto Eosinophil % 2.1   / Auto Neutrophil # 6.45  / Auto Neutrophil % 72.0  / BANDS % 1.0                          7.4    8.47  )-----------( 205      ( 31 Jul 2019 06:39 )             23.3     Auto Eosinophil # x     / Auto Eosinophil % x     / Auto Neutrophil # x     / Auto Neutrophil % x     / BANDS % x        08-01    140  |  101  |  6<L>  ----------------------------<  109<H>  3.2<L>   |  24  |  0.63  07-31    140  |  100  |  6<L>  ----------------------------<  96  3.3<L>   |  26  |  0.67    Ca    9.2      01 Aug 2019 07:30  Mg     1.7     08-01  Phos  3.7     08-01  TPro  5.9<L>  /  Alb  3.5  /  TBili  13.5<H>  /  DBili  x   /  AST  48<H>  /  ALT  60<H>  /  AlkPhos  82  08-01  TPro  6.2  /  Alb  3.5  /  TBili  15.3<H>  /  DBili  x   /  AST  43<H>  /  ALT  49<H>  /  AlkPhos  81  07-31    PT/INR - ( 01 Aug 2019 07:30 )   PT: 16.0 SEC;   INR: 1.39   PTT - ( 01 Aug 2019 07:30 )  PTT:28.5 SEC    Lactate, Blood: 0.7 mmol/L (07-26 @ 11:50)    Care Discussed with Consultants/Other Providers:    RADIOLOGY & ADDITIONAL TESTS:  (Imaging Personally Reviewed)

## 2019-08-02 NOTE — PROGRESS NOTE ADULT - PROBLEM SELECTOR PLAN 2
- Meets infectious workup for leukocytosis 21.39, tachycardic to the 130's with source presumed to be intraabdominal given her complaints and physical examination  - blood cultures with 1 bottle growing gram + rods with neg PCR, likely contaminant, Repeat BCx NGTD  - s/p IVF LR 200cc/ hour  - abx as above

## 2019-08-02 NOTE — PROGRESS NOTE ADULT - ATTENDING COMMENTS
Patient seen and examined with the GI fellow. I agree with the above assessment and plan. Thank you for allowing us to care for your patient.    Patient with abd pain. Check lipase/amylase.
Patient seen and examined with the GI fellow. I agree with the above assessment and plan. Thank you for allowing us to care for your patient.    Pt with cholangitis and Mirizzi syndrome. Post ERCP.     Plan per surgery. Will need repeat ERCP after surgery.
Nausea, vomiting resolved. Eating solid food without any issues. Still having periodic upper abdominal pain, however overall improving. Discussed that patient is overall improved and is stable for discharge home. Discussed will need close f/u with surgeon Dr. Collado for planning of outpatient cholecystectomy. Also will need f/u with GI for repeat ERCP.    Mildly elevated rising LFT's - suspect medication-induced, possibly 2/2 Zosyn. Will change abx to PO ciprofloxacin and Flagyl. Given bilirubin continues to improve and symptomatically patient improving, will have patient f/u for outpatient monitoring of LFT's for now.    40 min discharge time

## 2019-08-02 NOTE — DIETITIAN INITIAL EVALUATION ADULT. - PROBLEM SELECTOR PLAN 2
Patient with two days of periumbilical abdominal pain radiating to her back   Meets  clinical criteria for pancreatitis 2/2 due to acute epigastric pain and Lipase>600   Likely 2/2 to pigmental stones, given likely hemolysis of blood cells from HS  Will give IVF and pain medication PRN Dilaudid .5 q 4 hours

## 2019-08-02 NOTE — DIETITIAN INITIAL EVALUATION ADULT. - PROBLEM SELECTOR PLAN 4
AST 82,   Given cholestatic picture will further evaluate with MR abdomen    c/w IVF at rate of 100cc/ hour

## 2019-08-02 NOTE — PROGRESS NOTE ADULT - PROBLEM SELECTOR PROBLEM 4
Hereditary spherocytosis
Transaminitis
Hereditary spherocytosis

## 2019-08-02 NOTE — PROGRESS NOTE ADULT - PROVIDER SPECIALTY LIST ADULT
Gastroenterology
Internal Medicine
Surgery
Gastroenterology
Surgery
Internal Medicine

## 2019-08-02 NOTE — PROGRESS NOTE ADULT - PROBLEM SELECTOR PLAN 4
- acute on chronic hemolysis likely in setting of acute infection  - folate normal, hapto low, and LDH elevated. Hapto and LDH 96% specific for hemolysis, Retic Count % elevated.   - elevated bili in setting of hemolysis and acute quincy, resolved to baseline  - s/p 1u pRBC   - transfuse as needed to maintain Hb >7  - c/w home folic acid  - Active T&S

## 2019-08-02 NOTE — PROGRESS NOTE ADULT - SUBJECTIVE AND OBJECTIVE BOX
Chief Complaint:  Patient is a 38y old  Female who presents with a chief complaint of Hyperbilirubinemia (02 Aug 2019 12:02)      Interval Events: No adverse events overnight.  Still with intermittent abd pain and nausea.  Lipase elevated.    Allergies:  No Known Allergies      Hospital Medications:  folic acid 1 milliGRAM(s) Oral daily  HYDROmorphone   Tablet 2 milliGRAM(s) Oral every 4 hours PRN  ondansetron   Disintegrating Tablet 4 milliGRAM(s) Oral every 6 hours PRN  pantoprazole    Tablet 40 milliGRAM(s) Oral before breakfast  polyethylene glycol 3350 17 Gram(s) Oral two times a day PRN  potassium chloride    Tablet ER 40 milliEquivalent(s) Oral every 4 hours      PMHX/PSHX:  IUP (intrauterine pregnancy), incidental  Mucous polyp of cervix  Hereditary spherocytosis   history      Family history:  Family history of hypertension  Family history of hereditary spherocytosis      ROS:     General:  No wt loss, fevers, chills, night sweats, fatigue,   Eyes:  Good vision, no reported pain  ENT:  No sore throat, pain, runny nose, dysphagia  CV:  No pain, palpitations, hypo/hypertension  Resp:  No dyspnea, cough, tachypnea, wheezing  GI:  See HPI  :  No pain, bleeding, incontinence, nocturia  Muscle:  No pain, weakness  Neuro:  No weakness, tingling, memory problems  Psych:  No fatigue, insomnia, mood problems, depression  Endocrine:  No polyuria, polydipsia, cold/heat intolerance  Heme:  No petechiae, ecchymosis, easy bruisability  Skin:  No rash, edema      PHYSICAL EXAM:     GENERAL: NAD  HEENT:  NC/AT  CHEST:  Full & symmetric excursion, no increased effort  ABDOMEN:  Soft, non-tender, non-distended, +BS  EXTREMITIES:  no edema  SKIN:  No rash  NEURO:  Alert      Vital Signs:  Vital Signs Last 24 Hrs  T(C): 36.7 (02 Aug 2019 06:09), Max: 36.7 (02 Aug 2019 02:06)  T(F): 98 (02 Aug 2019 06:09), Max: 98.1 (02 Aug 2019 02:06)  HR: 75 (02 Aug 2019 06:09) (75 - 90)  BP: 110/64 (02 Aug 2019 06:09) (105/59 - 111/62)  BP(mean): --  RR: 18 (02 Aug 2019 06:09) (18 - 18)  SpO2: 100% (02 Aug 2019 06:09) (97% - 100%)  Daily     Daily Weight in k.8 (02 Aug 2019 11:28)    LABS:                        7.8    9.79  )-----------( 230      ( 02 Aug 2019 06:00 )             24.8     08-02    141  |  103  |  6<L>  ----------------------------<  97  3.3<L>   |  25  |  0.63    Ca    9.3      02 Aug 2019 06:00  Phos  4.4     08-02  Mg     1.9     0802    TPro  6.5  /  Alb  3.8  /  TBili  12.2<H>  /  DBili  x   /  AST  55<H>  /  ALT  75<H>  /  AlkPhos  82  08-02    LIVER FUNCTIONS - ( 02 Aug 2019 06:00 )  Alb: 3.8 g/dL / Pro: 6.5 g/dL / ALK PHOS: 82 u/L / ALT: 75 u/L / AST: 55 u/L / GGT: x           PT/INR - ( 01 Aug 2019 07:30 )   PT: 16.0 SEC;   INR: 1.39          PTT - ( 01 Aug 2019 07:30 )  PTT:28.5 SEC    Amylase [id number]      Lipase [id number]       Ammonia--      Imaging:    reviewed

## 2019-08-02 NOTE — PROGRESS NOTE ADULT - ASSESSMENT
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 Cholangiogram showing stricturing at CHD but not fistula. Currently responding well to medical therapy. T bili of 33 on admission but now downtrending.  # Acute Pancreatitis  Lipase of >600 on admission. Most likely gallstone pancreatitis  #Spherocytosis    Recommendation:  - continue IVF, nausea, and pain management for pancreatitis  - advance diet as tolerated   - check AM CBC, CMP to eval LFTs  - bowel regimen; would given Miralax 1-2 times daily  - IV antibiotics while here, would complete 5 day course total (can switch to PO as outpatient)  - followup with surgery with timing of outpatient cholecystectomy, patient concerned given busy upcoming schedule  - will need to be scheduled for repeat ERCP after cholecystectomy

## 2019-08-02 NOTE — DIETITIAN INITIAL EVALUATION ADULT. - PROBLEM SELECTOR PLAN 5
Patient takes folic acid at home, will continue home meds   Hgb 9.4, likely secondary to hemolysis . Anemia is normocytic  will check am folate, hapto, and LDH . Hapto and LDH 96% specific for   will check Retic Count %

## 2019-08-02 NOTE — PROGRESS NOTE ADULT - REASON FOR ADMISSION
Hyperbilirubinemia

## 2019-08-02 NOTE — PROGRESS NOTE ADULT - ASSESSMENT
38F PMHx hereditary spherocytosis (diagnosed at [facility] 26 years ago), who presents with abdominal pain, hyperbilirubinemia admitted for sepsis d/t acute pancreatitis and acute cholecystitis with Mirizzi syndrome in the setting of pigmented gallstones 2/2 to chronic hemolysis from hereditary spherocytosis. s/p ERCP with sphincterotomy and balloon sweep with removal of multiple stones on 7/29. Plan for outpatient cholecystectomy.

## 2019-08-02 NOTE — PROGRESS NOTE ADULT - PROBLEM SELECTOR PLAN 6
DVT PPX: improve score of .4 will encourage ambulation   Diet: Regular  Dispo: likely home, no needs

## 2019-08-02 NOTE — DIETITIAN INITIAL EVALUATION ADULT. - OTHER INFO
Pt remains with extended LOS Day 7. Pt was admitted with acute pancreatitis. PMHx hereditary spherocytosis (diagnosed at [facility] 26 years ago), who presents with abdominal pain, hyperbilirubinemia admitted for sepsis d/t acute pancreatitis and acute cholecystitis with Mirizzi syndrome in the setting of pigmented gallstones 2/2 to chronic hemolysis from hereditary spherocytosis. s/p ERCP with sphincterotomy and balloon sweep with removal of multiple stones on 7/29. Plan for outpatient cholecystectomy. Pt's diet was NPO---->Full--->NPO----> and now Regular (x 1 day). Pt consuming <25% of meals, prefers organic foods which she is used to at home, and also does not want to eat too much at present due to medical condition. Denies chewing, swallowing difficulties or any nausea, vomiting.

## 2019-08-02 NOTE — PROGRESS NOTE ADULT - PROBLEM SELECTOR PLAN 1
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 to chronic gallstones leading to biliary stasis and chronic inflammation)  - doppler US neg for hepatic vein thrombosis on 7/29  - surgery recs appreciated, plan for outpatient cholecystectomy   - GI recs appreciated, IV abx to PO when discharged, will need to be scheduled for repeat ERCP after cholecystectomy  - c/w zosyn for anaerobic and gram neg coverage (7/26 -> 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 to chronic gallstones leading to biliary stasis and chronic inflammation)  - doppler US neg for hepatic vein thrombosis on 7/29  - surgery recs appreciated, plan for outpatient cholecystectomy   - GI recs appreciated; will need to be scheduled for repeat ERCP after cholecystectomy  - s/p zosyn for anaerobic and gram neg coverage (7/26 ->8/2)

## 2019-08-08 ENCOUNTER — APPOINTMENT (OUTPATIENT)
Dept: TRAUMA SURGERY | Facility: CLINIC | Age: 39
End: 2019-08-08
Payer: COMMERCIAL

## 2019-08-08 VITALS
HEIGHT: 64 IN | HEART RATE: 80 BPM | BODY MASS INDEX: 26.8 KG/M2 | SYSTOLIC BLOOD PRESSURE: 121 MMHG | DIASTOLIC BLOOD PRESSURE: 78 MMHG | WEIGHT: 157 LBS | TEMPERATURE: 98.5 F

## 2019-08-08 PROCEDURE — XXXXX: CPT

## 2019-09-05 ENCOUNTER — INPATIENT (INPATIENT)
Facility: HOSPITAL | Age: 39
LOS: 6 days | Discharge: HOME HEALTH | End: 2019-09-12
Attending: HOSPITALIST | Admitting: HOSPITALIST
Payer: COMMERCIAL

## 2019-09-05 VITALS
DIASTOLIC BLOOD PRESSURE: 66 MMHG | TEMPERATURE: 98 F | SYSTOLIC BLOOD PRESSURE: 109 MMHG | OXYGEN SATURATION: 97 % | HEART RATE: 96 BPM | RESPIRATION RATE: 16 BRPM

## 2019-09-05 DIAGNOSIS — Z29.9 ENCOUNTER FOR PROPHYLACTIC MEASURES, UNSPECIFIED: ICD-10-CM

## 2019-09-05 DIAGNOSIS — K81.0 ACUTE CHOLECYSTITIS: ICD-10-CM

## 2019-09-05 DIAGNOSIS — Z87.59 PERSONAL HISTORY OF OTHER COMPLICATIONS OF PREGNANCY, CHILDBIRTH AND THE PUERPERIUM: Chronic | ICD-10-CM

## 2019-09-05 DIAGNOSIS — D58.0 HEREDITARY SPHEROCYTOSIS: ICD-10-CM

## 2019-09-05 DIAGNOSIS — R65.10 SYSTEMIC INFLAMMATORY RESPONSE SYNDROME (SIRS) OF NON-INFECTIOUS ORIGIN WITHOUT ACUTE ORGAN DYSFUNCTION: ICD-10-CM

## 2019-09-05 LAB
ALBUMIN SERPL ELPH-MCNC: 5.1 G/DL — HIGH (ref 3.3–5)
ALP SERPL-CCNC: 73 U/L — SIGNIFICANT CHANGE UP (ref 40–120)
ALT FLD-CCNC: 10 U/L — SIGNIFICANT CHANGE UP (ref 4–33)
ANION GAP SERPL CALC-SCNC: 18 MMO/L — HIGH (ref 7–14)
ANISOCYTOSIS BLD QL: SIGNIFICANT CHANGE UP
AST SERPL-CCNC: 13 U/L — SIGNIFICANT CHANGE UP (ref 4–32)
BASE EXCESS BLDV CALC-SCNC: -6.5 MMOL/L — SIGNIFICANT CHANGE UP
BASOPHILS # BLD AUTO: 0.05 K/UL — SIGNIFICANT CHANGE UP (ref 0–0.2)
BASOPHILS NFR BLD AUTO: 0.2 % — SIGNIFICANT CHANGE UP (ref 0–2)
BASOPHILS NFR SPEC: 0 % — SIGNIFICANT CHANGE UP (ref 0–2)
BILIRUB DIRECT SERPL-MCNC: 1.6 MG/DL — HIGH (ref 0.1–0.2)
BILIRUB SERPL-MCNC: 23.5 MG/DL — HIGH (ref 0.2–1.2)
BLASTS # FLD: 0 % — SIGNIFICANT CHANGE UP (ref 0–0)
BLOOD GAS VENOUS - CREATININE: 1.23 MG/DL — SIGNIFICANT CHANGE UP (ref 0.5–1.3)
BLOOD GAS VENOUS - FIO2: 21 — SIGNIFICANT CHANGE UP
BUN SERPL-MCNC: 9 MG/DL — SIGNIFICANT CHANGE UP (ref 7–23)
CALCIUM SERPL-MCNC: 9.9 MG/DL — SIGNIFICANT CHANGE UP (ref 8.4–10.5)
CHLORIDE BLDV-SCNC: 112 MMOL/L — HIGH (ref 96–108)
CHLORIDE SERPL-SCNC: 102 MMOL/L — SIGNIFICANT CHANGE UP (ref 98–107)
CO2 SERPL-SCNC: 22 MMOL/L — SIGNIFICANT CHANGE UP (ref 22–31)
CREAT SERPL-MCNC: 0.5 MG/DL — SIGNIFICANT CHANGE UP (ref 0.5–1.3)
EOSINOPHIL # BLD AUTO: 0 K/UL — SIGNIFICANT CHANGE UP (ref 0–0.5)
EOSINOPHIL NFR BLD AUTO: 0 % — SIGNIFICANT CHANGE UP (ref 0–6)
EOSINOPHIL NFR FLD: 0 % — SIGNIFICANT CHANGE UP (ref 0–6)
GAS PNL BLDV: 136 MMOL/L — SIGNIFICANT CHANGE UP (ref 136–146)
GIANT PLATELETS BLD QL SMEAR: PRESENT — SIGNIFICANT CHANGE UP
GLUCOSE BLDV-MCNC: 133 MG/DL — HIGH (ref 70–99)
GLUCOSE SERPL-MCNC: 163 MG/DL — HIGH (ref 70–99)
HCG SERPL-ACNC: < 5 MIU/ML — SIGNIFICANT CHANGE UP
HCG SERPL-ACNC: < 5 MIU/ML — SIGNIFICANT CHANGE UP
HCO3 BLDV-SCNC: 19 MMOL/L — LOW (ref 20–27)
HCT VFR BLD CALC: 27.2 % — LOW (ref 34.5–45)
HCT VFR BLDV CALC: 28.5 % — LOW (ref 34.5–45)
HGB BLD-MCNC: 9.1 G/DL — LOW (ref 11.5–15.5)
HGB BLDV-MCNC: 9.2 G/DL — LOW (ref 11.5–15.5)
IMM GRANULOCYTES NFR BLD AUTO: 1.6 % — HIGH (ref 0–1.5)
LACTATE BLDV-MCNC: 1.1 MMOL/L — SIGNIFICANT CHANGE UP (ref 0.5–2)
LIDOCAIN IGE QN: 110.9 U/L — HIGH (ref 7–60)
LYMPHOCYTES # BLD AUTO: 0.3 K/UL — LOW (ref 1–3.3)
LYMPHOCYTES # BLD AUTO: 1.3 % — LOW (ref 13–44)
LYMPHOCYTES NFR SPEC AUTO: 0 % — LOW (ref 13–44)
MCHC RBC-ENTMCNC: 27.3 PG — SIGNIFICANT CHANGE UP (ref 27–34)
MCHC RBC-ENTMCNC: 33.5 % — SIGNIFICANT CHANGE UP (ref 32–36)
MCV RBC AUTO: 81.7 FL — SIGNIFICANT CHANGE UP (ref 80–100)
METAMYELOCYTES # FLD: 0 % — SIGNIFICANT CHANGE UP (ref 0–1)
MICROCYTES BLD QL: SIGNIFICANT CHANGE UP
MONOCYTES # BLD AUTO: 0.9 K/UL — SIGNIFICANT CHANGE UP (ref 0–0.9)
MONOCYTES NFR BLD AUTO: 3.9 % — SIGNIFICANT CHANGE UP (ref 2–14)
MONOCYTES NFR BLD: 0 % — LOW (ref 2–9)
MYELOCYTES NFR BLD: 0.9 % — HIGH (ref 0–0)
NEUTROPHIL AB SER-ACNC: 99.1 % — HIGH (ref 43–77)
NEUTROPHILS # BLD AUTO: 21.66 K/UL — HIGH (ref 1.8–7.4)
NEUTROPHILS NFR BLD AUTO: 93 % — HIGH (ref 43–77)
NEUTS BAND # BLD: 0 % — SIGNIFICANT CHANGE UP (ref 0–6)
NRBC # FLD: 0.05 K/UL — SIGNIFICANT CHANGE UP (ref 0–0)
OTHER - HEMATOLOGY %: 0 — SIGNIFICANT CHANGE UP
OVALOCYTES BLD QL SMEAR: SLIGHT — SIGNIFICANT CHANGE UP
PCO2 BLDV: 36 MMHG — LOW (ref 41–51)
PH BLDV: 7.33 PH — SIGNIFICANT CHANGE UP (ref 7.32–7.43)
PLATELET # BLD AUTO: 252 K/UL — SIGNIFICANT CHANGE UP (ref 150–400)
PLATELET COUNT - ESTIMATE: NORMAL — SIGNIFICANT CHANGE UP
PMV BLD: 10.6 FL — SIGNIFICANT CHANGE UP (ref 7–13)
PO2 BLDV: 104 MMHG — HIGH (ref 35–40)
POIKILOCYTOSIS BLD QL AUTO: SIGNIFICANT CHANGE UP
POLYCHROMASIA BLD QL SMEAR: SLIGHT — SIGNIFICANT CHANGE UP
POTASSIUM BLDV-SCNC: 3.2 MMOL/L — LOW (ref 3.4–4.5)
POTASSIUM SERPL-MCNC: 3.4 MMOL/L — LOW (ref 3.5–5.3)
POTASSIUM SERPL-SCNC: 3.4 MMOL/L — LOW (ref 3.5–5.3)
PROMYELOCYTES # FLD: 0 % — SIGNIFICANT CHANGE UP (ref 0–0)
PROT SERPL-MCNC: 7.6 G/DL — SIGNIFICANT CHANGE UP (ref 6–8.3)
RBC # BLD: 3.33 M/UL — LOW (ref 3.8–5.2)
RBC # FLD: 24.5 % — HIGH (ref 10.3–14.5)
SAO2 % BLDV: 97.5 % — HIGH (ref 60–85)
SODIUM SERPL-SCNC: 142 MMOL/L — SIGNIFICANT CHANGE UP (ref 135–145)
VARIANT LYMPHS # BLD: 0 % — SIGNIFICANT CHANGE UP
WBC # BLD: 23.28 K/UL — HIGH (ref 3.8–10.5)
WBC # FLD AUTO: 23.28 K/UL — HIGH (ref 3.8–10.5)

## 2019-09-05 PROCEDURE — 99223 1ST HOSP IP/OBS HIGH 75: CPT | Mod: AI,GC

## 2019-09-05 PROCEDURE — 76700 US EXAM ABDOM COMPLETE: CPT | Mod: 26

## 2019-09-05 PROCEDURE — 99222 1ST HOSP IP/OBS MODERATE 55: CPT | Mod: GC

## 2019-09-05 RX ORDER — ONDANSETRON 8 MG/1
4 TABLET, FILM COATED ORAL ONCE
Refills: 0 | Status: COMPLETED | OUTPATIENT
Start: 2019-09-05 | End: 2019-09-05

## 2019-09-05 RX ORDER — ONDANSETRON 8 MG/1
4 TABLET, FILM COATED ORAL EVERY 8 HOURS
Refills: 0 | Status: DISCONTINUED | OUTPATIENT
Start: 2019-09-05 | End: 2019-09-12

## 2019-09-05 RX ORDER — ACETAMINOPHEN 500 MG
1000 TABLET ORAL ONCE
Refills: 0 | Status: COMPLETED | OUTPATIENT
Start: 2019-09-05 | End: 2019-09-06

## 2019-09-05 RX ORDER — HYDROMORPHONE HYDROCHLORIDE 2 MG/ML
1 INJECTION INTRAMUSCULAR; INTRAVENOUS; SUBCUTANEOUS ONCE
Refills: 0 | Status: DISCONTINUED | OUTPATIENT
Start: 2019-09-05 | End: 2019-09-05

## 2019-09-05 RX ORDER — PANTOPRAZOLE SODIUM 20 MG/1
40 TABLET, DELAYED RELEASE ORAL
Refills: 0 | Status: DISCONTINUED | OUTPATIENT
Start: 2019-09-05 | End: 2019-09-06

## 2019-09-05 RX ORDER — SODIUM CHLORIDE 9 MG/ML
1000 INJECTION INTRAMUSCULAR; INTRAVENOUS; SUBCUTANEOUS ONCE
Refills: 0 | Status: COMPLETED | OUTPATIENT
Start: 2019-09-05 | End: 2019-09-05

## 2019-09-05 RX ORDER — SODIUM CHLORIDE 9 MG/ML
1000 INJECTION INTRAMUSCULAR; INTRAVENOUS; SUBCUTANEOUS
Refills: 0 | Status: COMPLETED | OUTPATIENT
Start: 2019-09-05 | End: 2019-09-07

## 2019-09-05 RX ORDER — HYDROMORPHONE HYDROCHLORIDE 2 MG/ML
0.5 INJECTION INTRAMUSCULAR; INTRAVENOUS; SUBCUTANEOUS ONCE
Refills: 0 | Status: DISCONTINUED | OUTPATIENT
Start: 2019-09-05 | End: 2019-09-05

## 2019-09-05 RX ORDER — ACETAMINOPHEN 500 MG
750 TABLET ORAL ONCE
Refills: 0 | Status: DISCONTINUED | OUTPATIENT
Start: 2019-09-05 | End: 2019-09-05

## 2019-09-05 RX ORDER — MORPHINE SULFATE 50 MG/1
4 CAPSULE, EXTENDED RELEASE ORAL ONCE
Refills: 0 | Status: DISCONTINUED | OUTPATIENT
Start: 2019-09-05 | End: 2019-09-05

## 2019-09-05 RX ORDER — ACETAMINOPHEN 500 MG
650 TABLET ORAL EVERY 6 HOURS
Refills: 0 | Status: DISCONTINUED | OUTPATIENT
Start: 2019-09-05 | End: 2019-09-08

## 2019-09-05 RX ORDER — PIPERACILLIN AND TAZOBACTAM 4; .5 G/20ML; G/20ML
3.38 INJECTION, POWDER, LYOPHILIZED, FOR SOLUTION INTRAVENOUS ONCE
Refills: 0 | Status: COMPLETED | OUTPATIENT
Start: 2019-09-05 | End: 2019-09-05

## 2019-09-05 RX ORDER — PIPERACILLIN AND TAZOBACTAM 4; .5 G/20ML; G/20ML
3.38 INJECTION, POWDER, LYOPHILIZED, FOR SOLUTION INTRAVENOUS EVERY 8 HOURS
Refills: 0 | Status: COMPLETED | OUTPATIENT
Start: 2019-09-05 | End: 2019-09-12

## 2019-09-05 RX ORDER — HYDROMORPHONE HYDROCHLORIDE 2 MG/ML
0.5 INJECTION INTRAMUSCULAR; INTRAVENOUS; SUBCUTANEOUS EVERY 4 HOURS
Refills: 0 | Status: DISCONTINUED | OUTPATIENT
Start: 2019-09-05 | End: 2019-09-06

## 2019-09-05 RX ORDER — ACETAMINOPHEN 500 MG
1000 TABLET ORAL ONCE
Refills: 0 | Status: DISCONTINUED | OUTPATIENT
Start: 2019-09-06 | End: 2019-09-06

## 2019-09-05 RX ADMIN — HYDROMORPHONE HYDROCHLORIDE 1 MILLIGRAM(S): 2 INJECTION INTRAMUSCULAR; INTRAVENOUS; SUBCUTANEOUS at 09:30

## 2019-09-05 RX ADMIN — PIPERACILLIN AND TAZOBACTAM 200 GRAM(S): 4; .5 INJECTION, POWDER, LYOPHILIZED, FOR SOLUTION INTRAVENOUS at 06:18

## 2019-09-05 RX ADMIN — PIPERACILLIN AND TAZOBACTAM 25 GRAM(S): 4; .5 INJECTION, POWDER, LYOPHILIZED, FOR SOLUTION INTRAVENOUS at 21:19

## 2019-09-05 RX ADMIN — SODIUM CHLORIDE 100 MILLILITER(S): 9 INJECTION INTRAMUSCULAR; INTRAVENOUS; SUBCUTANEOUS at 13:43

## 2019-09-05 RX ADMIN — HYDROMORPHONE HYDROCHLORIDE 1 MILLIGRAM(S): 2 INJECTION INTRAMUSCULAR; INTRAVENOUS; SUBCUTANEOUS at 08:32

## 2019-09-05 RX ADMIN — MORPHINE SULFATE 4 MILLIGRAM(S): 50 CAPSULE, EXTENDED RELEASE ORAL at 04:53

## 2019-09-05 RX ADMIN — PIPERACILLIN AND TAZOBACTAM 25 GRAM(S): 4; .5 INJECTION, POWDER, LYOPHILIZED, FOR SOLUTION INTRAVENOUS at 13:39

## 2019-09-05 RX ADMIN — HYDROMORPHONE HYDROCHLORIDE 0.5 MILLIGRAM(S): 2 INJECTION INTRAMUSCULAR; INTRAVENOUS; SUBCUTANEOUS at 14:02

## 2019-09-05 RX ADMIN — ONDANSETRON 4 MILLIGRAM(S): 8 TABLET, FILM COATED ORAL at 08:32

## 2019-09-05 RX ADMIN — HYDROMORPHONE HYDROCHLORIDE 0.5 MILLIGRAM(S): 2 INJECTION INTRAMUSCULAR; INTRAVENOUS; SUBCUTANEOUS at 19:07

## 2019-09-05 RX ADMIN — ONDANSETRON 4 MILLIGRAM(S): 8 TABLET, FILM COATED ORAL at 17:44

## 2019-09-05 RX ADMIN — HYDROMORPHONE HYDROCHLORIDE 0.5 MILLIGRAM(S): 2 INJECTION INTRAMUSCULAR; INTRAVENOUS; SUBCUTANEOUS at 13:39

## 2019-09-05 RX ADMIN — HYDROMORPHONE HYDROCHLORIDE 0.5 MILLIGRAM(S): 2 INJECTION INTRAMUSCULAR; INTRAVENOUS; SUBCUTANEOUS at 22:28

## 2019-09-05 RX ADMIN — ONDANSETRON 4 MILLIGRAM(S): 8 TABLET, FILM COATED ORAL at 04:52

## 2019-09-05 RX ADMIN — SODIUM CHLORIDE 1000 MILLILITER(S): 9 INJECTION INTRAMUSCULAR; INTRAVENOUS; SUBCUTANEOUS at 08:33

## 2019-09-05 RX ADMIN — HYDROMORPHONE HYDROCHLORIDE 0.5 MILLIGRAM(S): 2 INJECTION INTRAMUSCULAR; INTRAVENOUS; SUBCUTANEOUS at 22:50

## 2019-09-05 RX ADMIN — HYDROMORPHONE HYDROCHLORIDE 0.5 MILLIGRAM(S): 2 INJECTION INTRAMUSCULAR; INTRAVENOUS; SUBCUTANEOUS at 20:01

## 2019-09-05 RX ADMIN — HYDROMORPHONE HYDROCHLORIDE 0.5 MILLIGRAM(S): 2 INJECTION INTRAMUSCULAR; INTRAVENOUS; SUBCUTANEOUS at 06:18

## 2019-09-05 NOTE — CONSULT NOTE ADULT - ASSESSMENT
Impression:    1. Hyperbilrubinemia, but w normal CBD on US and otherwise nomral LFTs. 23 on presentation, was 13 on hospital d/c. Ddx is hemolysis and cholestasis, less likely biliary obstruction given recent, stent, o/w normal LFTs.    2. 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 Mirrizi syndrome s/p plastic biliary stent    Recommendation:   -obtain an MRCP to confirm no CBD stones  -obtain a direct bilirubin, hemolysis markers

## 2019-09-05 NOTE — H&P ADULT - PROBLEM SELECTOR PLAN 3
-Patient takes folic acid at home, will continue home meds   -Hgb 9.1, likely secondary to hemolysis . Anemia is normocytic  -Haptoglobin 07/19 < 20  -Will continue to monitor   -Maintain active type and screen  -Transfuse Hgb < 7

## 2019-09-05 NOTE — ED PROVIDER NOTE - PHYSICAL EXAMINATION
general: uncomfortable appearing female, moderate distress   heent: normocephalic, atraumatic   respiratory: normal work of breathing, lungs clear to auscultation bilaterally  cardiac: regular rate and rhythm   abdomen: soft, RLQ tenderness to palpation   msk: no swelling or tenderness of lower extremities   skin: warm, dry   neuro: A&Ox3

## 2019-09-05 NOTE — H&P ADULT - HISTORY OF PRESENT ILLNESS
37 Yo f hx of hereditary spherocytosis, Mirizzi syndrome, gallstone pancreatitis, cholecystitis presenting w/ progressively worsening abdominal pain x 1 week. Per patient, pain is sharp, 10/10 in severity and located in the right upper quadrant w/ radiation to the epigastric region and back. ROS positive for nausea w/ associated vomiting ("green w/ mucus"), decreased appetite and fatigue. Pt denies fevers, chills, CP, SOB, const/diarrhea, urinary changes, weight loss, skin rashes/lesions, recent travel sick contact. Of note patient was recently admitted at this facility 07/2019 for acute cholecystitis and gallstone pancreatitis. Patient underwent ERCP w/  sphincterotomy and stent placement, w/ balloon sweep and removal of multiple dark stones.  Cholangiogram sig for mild CHD stricture but no fistula. 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    In the E.D vitals were sig for tachycardia to 106. Labs sig for WBC to 23.28. Elevated T bili to 23.5. Direct bili 1.6. Lipase 110.9. AB US sig for acute cholecystitis. Patient given 1L NS started on zosyn. 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 Per patient, pain is sharp, 10/10 in severity and located in the right upper quadrant w/ radiation to the epigastric region and back. ROS positive for nausea w/ associated vomiting ("green w/ mucus"), decreased appetite and fatigue. Pt denies fevers, chills, CP, SOB, const/diarrhea, urinary changes, weight loss, skin rashes/lesions, recent travel sick contact. Of note patient was recently admitted at this facility 07/2019 for acute cholecystitis and gallstone pancreatitis. Patient underwent ERCP w/  sphincterotomy and stent placement, w/ balloon sweep and removal of multiple dark stones.  Cholangiogram sig for mild CHD stricture but no fistula. 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    In the E.D vitals were sig for tachycardia to 106. Labs sig for WBC to 23.28. Elevated T bili to 23.5. Direct bili 1.6. Lipase 110.9. AB US sig for acute cholecystitis. Patient given 1L NS started on zosyn.

## 2019-09-05 NOTE — ED PROVIDER NOTE - CLINICAL SUMMARY MEDICAL DECISION MAKING FREE TEXT BOX
39F presenting with nausea, vomiting and abdominal pain. symptoms similar to previous episode of pancreatitis. no chest pain. rLQ tenderness on exam so concern for pancreatitis vs appendicitis. plan for labs, ct abdomen and pelvis, pain control. will reassess.

## 2019-09-05 NOTE — H&P ADULT - NSHPLABSRESULTS_GEN_ALL_CORE
9.1    23.28 )-----------( 252      ( 05 Sep 2019 04:30 )             27.2     Auto Eosinophil # 0.00  / Auto Eosinophil % 0.0   / Auto Neutrophil # 21.66 / Auto Neutrophil % 93.0  / BANDS % 0        09-05    142  |  102  |  9   ----------------------------<  163<H>  3.4<L>   |  22  |  0.50    Ca    9.9      05 Sep 2019 04:30  TPro  x   /  Alb  x   /  TBili  x   /  DBili  1.6<H>  /  AST  x   /  ALT  x   /  AlkPhos  x   09-05  TPro  7.6  /  Alb  5.1<H>  /  TBili  23.5<H>  /  DBili  x   /  AST  13  /  ALT  10  /  AlkPhos  73  09-05                RADIOLOGY & IMAGING:    ABDOMINAL U/S (09/05/2019):    IMPRESSION:     Acute cholecystitis.    Splenomegaly.

## 2019-09-05 NOTE — ED PROVIDER NOTE - ATTENDING CONTRIBUTION TO CARE
Afebrile. Awake and Alert. Lungs CTA. Heart RRR. Abdomen soft, +RUQ TTP and epigastric TTP, ND. CN II-XII grossly intact. Moves all extremities without lateralization.

## 2019-09-05 NOTE — H&P ADULT - ASSESSMENT
40 Yo f hx of hereditary spherocytosis, Mirizzi syndrome, gallstone pancreatitis, cholecystitis presenting w/ progressively worsening abdominal pain x 1 week, admitted for acute cholecystitis

## 2019-09-05 NOTE — ED ADULT TRIAGE NOTE - CHIEF COMPLAINT QUOTE
Patient developed abdominal pain and 3 episode of vomiting since 2130 yesterday.  Denies fever or diarrhea.  She was seen in July at [facility] for acute pancreatitis and states her symptoms are similar.

## 2019-09-05 NOTE — H&P ADULT - PROBLEM SELECTOR PLAN 1
-Admission 07/2019 for acute cholecystitis and gallstone pancreatitis. Underwent ERCP w/  sphincterotomy and stent placement, w/ balloon sweep and removal of multiple dark stones.  Cholangiogram sig for mild CHD stricture but no fistula. 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  -AB US at this time sig for acute cholecystitis   -c/w zosyn (09/05-  -c/w tylenol PRN  -c/w dilaudid 0.5 q4 PRN  -c/w gentle hydration  -GI consulted  -Surgery consulted  -Will get MRCP  -Will trend bilirubin; Total bili elevated to 23.5. Direct bili 1.6  -NPO for now

## 2019-09-05 NOTE — H&P ADULT - NSHPPHYSICALEXAM_GEN_ALL_CORE
GENERAL APPEARANCE: NAD, Middle aged female lying uncomfortably in bed   HEENT:  PERRL, EOMI. Conjunctiva clear   NECK: Neck supple, non-tender without lymphadenopathy  CARDIAC: RRR, S1S2 normal, no murmurs, rubs or gallops  LUNGS: CTAB, no wheezing, no rales  ABDOMEN: TTP RUQ, + Bowel sounds, Negative Wheeler's sign, No guarding or rebound. No peritoneal signs  MUSCULOSKELETAL: ROM intact  EXTREMITIES: No significant deformity or joint abnormality. No edema. Peripheral pulses intact.   NEUROLOGICAL: Strength and sensation symmetric and intact throughout.   SKIN: Warm and dry.  PSYCHIATRIC: AOx3. Normal affect and behavior.

## 2019-09-05 NOTE — CONSULT NOTE ADULT - SUBJECTIVE AND OBJECTIVE BOX
CC: 39y old Female admitted with a chief complaint of abdominal pain and emesis    HPI:  39yoF with h/o hereditary spherocytosis admitted here in August for gallstone pancreatitis, Mirizzi syndrome s/p ERCP with stent placement with removal of gallstones returns with 3 days of worsening RUQ abd pain and emesis x 2 days. Patient denies HA, CP, SOB, diarrhea, or any other complaints at this time.     PMHx: IUP (intrauterine pregnancy), incidental  Mucous polyp of cervix  Hereditary spherocytosis  Mirizzi syndrome   Gallstone pancreatitis Aug '19    PSHx:  history    Allergies: No Known Allergies  Family Hx: Family history of hypertension  Family history of hereditary spherocytosis      Physical Exam  T(C): 37.2  HR: 89 (89 - 106)  BP: 125/68 (109/66 - 140/63)  RR: 18 (16 - 18)  SpO2: 100% (97% - 100%)  Tmax: T(C): , Max: 37.3 (19 @ 06:14)    General: well developed, well nourished, NAD  Neuro: alert and oriented, no focal deficits, moves all extremities spontaneously  HEENT: NCAT, EOMI, anicteric, mucosa moist  Respiratory: airway patent, respirations unlabored  CVS: regular rate and rhythm  Abdomen: soft, nondistended, RUQ tender to palpation  Extremities: no edema, sensation and movement grossly intact  Skin: warm, dry, appropriate color    Labs:                        9.1    23.28 )-----------( 252      ( 05 Sep 2019 04:30 )             27.2           142  |  102  |  9   ----------------------------<  163<H>  3.4<L>   |  22  |  0.50    Ca    9.9      05 Sep 2019 04:30    TPro  7.6  /  Alb  5.1<H>  /  TBili  23.5<H>  /  DBili  x   /  AST  13  /  ALT  10  /  AlkPhos  73  -      Imaging and other studies:  Abd U/S: Acute cholecystitis.  Splenomegaly.    A/P 39F with h/o Mirizzi syndrome returns one month later with acute cholecystitis, elevated bilirubin to 23 (from 12 upon discharge in August)  - NPO  - IVF  - rec GI to be called by primary team  - will discuss surgery plan when Bilirubin level decreases  - will follow as a consult patient under B team Surgery/Dr. Collado pager #[phone]

## 2019-09-05 NOTE — CHART NOTE - NSCHARTNOTEFT_GEN_A_CORE
Provider contacted by nursing staff for persistent pain. Pt seen and examined at bedside. Pt endorses R sided abdominal pain and nausea. Denies emesis, SOB, chest pain, lightheadedness/dizziness. Requesting additional pain medication    VS: T 37.6, , /70, RR 16, SpO2 100% RA  Gen: resting comfortably in bed, NAD  Resp: breathing comfortably  Abd: soft, very tender in RUQ and R mid abdomen, ND. No rebound/guarding     40 Yo f hx of hereditary spherocytosis, Mirizzi syndrome, gallstone pancreatitis, cholecystitis presenting w/ progressively worsening abdominal pain x 1 week, admitted for acute cholecystitis, awaiting MRCP to r/o CBD stones.     - Serial abd exams  - NPO, IVF  - Abx  - Pending MRCP per GI recs  - Will administer additional 0.5mg dilaudid x1 and start standing IV tylenol     Jatin Zheng, PGY-1

## 2019-09-05 NOTE — ED ADULT NURSE NOTE - CHIEF COMPLAINT QUOTE
Patient developed abdominal pain and 3 episode of vomiting since 2130 yesterday.  Denies fever or diarrhea.  She was seen in July at [facility] for acute pancreatitis and states her symptoms are similar.

## 2019-09-05 NOTE — H&P ADULT - PROBLEM SELECTOR PLAN 2
-Pt met SIRS criteria on admission w/ tachycardia to 106 and leukocytosis to 23.28. Likely 2/2 acute cholecystitis.   -Elevated lipase likely 2/2 cholecystitis  -c/w zosyn for now  -F/u blood cultures x 2  -F/u UA and urine culture  -c/w gentle hydration  -Trend CBCs

## 2019-09-05 NOTE — PATIENT PROFILE ADULT - NSPROEXTENSIONSOFSELF_GEN_A_NUR
none Airway patent, Nasal mucosa clear. Mouth with normal mucosa. Throat has no vesicles, no oropharyngeal exudates and uvula is midline.

## 2019-09-05 NOTE — ED ADULT NURSE REASSESSMENT NOTE - NS ED NURSE REASSESS COMMENT FT1
Report received from night RN. Pt AxOx3. Pt complaining of back and abdominal pain at this time. Pt actively vomiting. Medications given as per MD orders. IVL clear and patent. Surgery at bedside with patient at this time. Will continue to monitor.

## 2019-09-05 NOTE — ED PROVIDER NOTE - PROGRESS NOTE DETAILS
Jonathan Weil, PGY3 - US c/w cholecystitis. General surgery consulted. Abx given before US. Jonathan Weil, PGY3 - gen surg declined to admit to their service on concern that the elevated bili indicates recurrent biliary tree obstruction, though notably her LFTs are not changed. Will admit to medicine.

## 2019-09-05 NOTE — ED PROVIDER NOTE - OBJECTIVE STATEMENT
39F, pmh of gallstone pancreatitis, hereditary spherocytosis presenting with abdominal pain. patient reports nausea, vomiting back and abdominal pain. symptoms similar to when she had pancreatitis. denies fever, chest pain, difficulty breathing, pain or burning with urination, pain or swelling of lower extremities.

## 2019-09-05 NOTE — H&P ADULT - NSHPSOCIALHISTORY_GEN_ALL_CORE
Socially drinks , but reports low drinking tolerance. Denies smoking ( only in high school) has quit over 10 years ago. Denies illicit drug use. Smokes marijuana at times. Socially drinks , but reports low drinking tolerance. Denies smoking (only in high school) has quit over 10 years ago. Denies illicit drug use. Smokes marijuana at times.

## 2019-09-05 NOTE — ED ADULT NURSE NOTE - OBJECTIVE STATEMENT
38y/o female aaox4 and ambulatory c/o abdominal pain. Pt states she was recently hospitalized at [facility] for acute pancreatitis; pt states the symptoms are similar. Pt describes pain as R sided intermittent sharp pains with radiation to the R lower back accompanied by N/V; denies blood in vomit. Pt denies chest pain, sOB, palpitations, diaphoresis, urinary urgency/frequency, diarrhea. Vital signs as noted. 22g in R hand; labs collected and sent as per MD order. PT medicated as per MD order. Call light within reach. Will continue to monitor.

## 2019-09-05 NOTE — H&P ADULT - NSHPREVIEWOFSYSTEMS_GEN_ALL_CORE
CONSTITUTIONAL:  +generalized weakness/fatigue, No weight loss, fever, chills  HEENT:  No visual loss, blurred vision, no sneezing, congestion, runny nose or sore throat.  CARDIOVASCULAR:  No chest pain, chest pressure or chest discomfort. No palpitations.  RESPIRATORY:  No shortness of breath, cough or sputum.  GASTROINTESTINAL:  + Vomiting, + Nausea, + Abdominal pain, No constipation or diarrhea   MUSCULOSKELETAL:  No muscle, back pain, joint pain or stiffness.  NEUROLOGICAL:  No headache, dizziness, syncope  GENITOURINARY:  Denies hematuria, dysuria.   SKIN:  No rash or itching, or changes in skin color

## 2019-09-05 NOTE — CONSULT NOTE ADULT - SUBJECTIVE AND OBJECTIVE BOX
Chief Complaint:  Patient is a 39y old  Female who presents with a chief complaint of Cholecystitis (05 Sep 2019 12:07)      HPI:  This is a 39 year old female with hereditary spherocytosis, recent biliary stent for Mirrizi syndrome who presented for 2 days of RUQ pain radiating to the back. She denies fevers or chills but had nausea and vomiting. She had an admission in late July/early August for Mirrizi syndrome and on  had an ERCP which showed Mirrizi syndrome, a possible CHD stricture and stones which were removed (none remained). She also had presumed gallstone pancreatitis that admission and saw Dr. Collado in the office following discharge with plan for eventual lap quincy, but now her pain brings her to the hospital.  Her only home medicine is folic acid.    Allergies:  No Known Allergies      Home Medications:    Hospital Medications:  acetaminophen   Tablet .. 650 milliGRAM(s) Oral every 6 hours PRN  HYDROmorphone  Injectable 0.5 milliGRAM(s) IV Push every 4 hours PRN  ondansetron Injectable 4 milliGRAM(s) IV Push every 8 hours PRN  pantoprazole    Tablet 40 milliGRAM(s) Oral before breakfast  piperacillin/tazobactam IVPB.. 3.375 Gram(s) IV Intermittent every 8 hours  sodium chloride 0.9%. 1000 milliLiter(s) IV Continuous <Continuous>      PMHX/PSHX:  IUP (intrauterine pregnancy), incidental  Mucous polyp of cervix  Hereditary spherocytosis   history      Family history:  Family history of hypertension  Family history of hereditary spherocytosis      Social History:     ROS:     General:  No wt loss, fevers, chills, night sweats, fatigue,   Eyes:  Good vision, no reported pain  ENT:  No sore throat, pain, runny nose, dysphagia  CV:  No pain, palpitations, hypo/hypertension  Resp:  No dyspnea, cough, tachypnea, wheezing  GI:  See HPI  :  No pain, bleeding, incontinence, nocturia  Muscle:  No pain, weakness  Neuro:  No weakness, tingling, memory problems  Psych:  No fatigue, insomnia, mood problems, depression  Endocrine:  No polyuria, polydipsia, cold/heat intolerance  Heme:  No petechiae, ecchymosis, easy bruisability  Skin:  No rash, edema      PHYSICAL EXAM:     GENERAL:  Appears stated age, well-groomed, well-nourished, no distress  HEENT:  NC/AT,  conjunctivae clear and pink,  no JVD, icterus  CHEST:  Full & symmetric excursion, no increased effort, breath sounds clear  HEART:  Regular rhythm, S1, S2, no murmur/rub/S3/S4, no abdominal bruit, no edema  ABDOMEN:  Soft, RUQ-tender, non-distended, normoactive bowel sounds,  no masses , no hepatosplenomegaly  EXTREMITIES:  no cyanosis,clubbing or edema  SKIN:  No rash/erythema/ecchymoses/petechiae/wounds/abscess/warm/dry  NEURO:  Alert, oriented    Vital Signs:  Vital Signs Last 24 Hrs  T(C): 37.3 (05 Sep 2019 13:38), Max: 37.3 (05 Sep 2019 06:14)  T(F): 99.2 (05 Sep 2019 13:38), Max: 99.2 (05 Sep 2019 11:20)  HR: 97 (05 Sep 2019 13:38) (89 - 110)  BP: 110/72 (05 Sep 2019 13:38) (109/66 - 140/63)  BP(mean): --  RR: 17 (05 Sep 2019 13:38) (16 - 19)  SpO2: 100% (05 Sep 2019 13:38) (97% - 100%)  Daily Height in cm: 162.56 (05 Sep 2019 12:29)    Daily     LABS:                        9.1    23.28 )-----------( 252      ( 05 Sep 2019 04:30 )             27.2     -    142  |  102  |  9   ----------------------------<  163<H>  3.4<L>   |  22  |  0.50    Ca    9.9      05 Sep 2019 04:30    TPro  x   /  Alb  x   /  TBili  x   /  DBili  1.6<H>  /  AST  x   /  ALT  x   /  AlkPhos  x   09-    LIVER FUNCTIONS - ( 05 Sep 2019 04:30 )  Alb: 5.1 g/dL / Pro: 7.6 g/dL / ALK PHOS: 73 u/L / ALT: 10 u/L / AST: 13 u/L / GGT: x               Amylase Serum--      Lipase vafup356.9       Ammonia--      Imaging:

## 2019-09-05 NOTE — ED ADULT NURSE NOTE - NSIMPLEMENTINTERV_GEN_ALL_ED
Implemented All Universal Safety Interventions:  [place] to call system. Call bell, personal items and telephone within reach. Instruct patient to call for assistance. Room bathroom lighting operational. Non-slip footwear when patient is off stretcher. Physically safe environment: no spills, clutter or unnecessary equipment. Stretcher in lowest position, wheels locked, appropriate side rails in place.

## 2019-09-06 DIAGNOSIS — E80.6 OTHER DISORDERS OF BILIRUBIN METABOLISM: ICD-10-CM

## 2019-09-06 LAB
ALBUMIN SERPL ELPH-MCNC: 3.2 G/DL — LOW (ref 3.3–5)
ALBUMIN SERPL ELPH-MCNC: 3.2 G/DL — LOW (ref 3.3–5)
ALP SERPL-CCNC: 56 U/L — SIGNIFICANT CHANGE UP (ref 40–120)
ALP SERPL-CCNC: 56 U/L — SIGNIFICANT CHANGE UP (ref 40–120)
ALT FLD-CCNC: 15 U/L — SIGNIFICANT CHANGE UP (ref 4–33)
ALT FLD-CCNC: 15 U/L — SIGNIFICANT CHANGE UP (ref 4–33)
ANION GAP SERPL CALC-SCNC: 15 MMO/L — HIGH (ref 7–14)
ANION GAP SERPL CALC-SCNC: 15 MMO/L — HIGH (ref 7–14)
APTT BLD: 30 SEC — SIGNIFICANT CHANGE UP (ref 27.5–36.3)
APTT BLD: 30 SEC — SIGNIFICANT CHANGE UP (ref 27.5–36.3)
AST SERPL-CCNC: 16 U/L — SIGNIFICANT CHANGE UP (ref 4–32)
AST SERPL-CCNC: 16 U/L — SIGNIFICANT CHANGE UP (ref 4–32)
BASOPHILS # BLD AUTO: 0.03 K/UL — SIGNIFICANT CHANGE UP (ref 0–0.2)
BASOPHILS NFR BLD AUTO: 0.1 % — SIGNIFICANT CHANGE UP (ref 0–2)
BILIRUB DIRECT SERPL-MCNC: 5 MG/DL — HIGH (ref 0.1–0.2)
BILIRUB SERPL-MCNC: 19.6 MG/DL — HIGH (ref 0.2–1.2)
BILIRUB SERPL-MCNC: 19.6 MG/DL — HIGH (ref 0.2–1.2)
BLD GP AB SCN SERPL QL: NEGATIVE — SIGNIFICANT CHANGE UP
BUN SERPL-MCNC: 7 MG/DL — SIGNIFICANT CHANGE UP (ref 7–23)
BUN SERPL-MCNC: 7 MG/DL — SIGNIFICANT CHANGE UP (ref 7–23)
CALCIUM SERPL-MCNC: 7.1 MG/DL — LOW (ref 8.4–10.5)
CALCIUM SERPL-MCNC: 7.1 MG/DL — LOW (ref 8.4–10.5)
CHLORIDE SERPL-SCNC: 102 MMOL/L — SIGNIFICANT CHANGE UP (ref 98–107)
CHLORIDE SERPL-SCNC: 102 MMOL/L — SIGNIFICANT CHANGE UP (ref 98–107)
CO2 SERPL-SCNC: 20 MMOL/L — LOW (ref 22–31)
CO2 SERPL-SCNC: 20 MMOL/L — LOW (ref 22–31)
CREAT SERPL-MCNC: 0.37 MG/DL — LOW (ref 0.5–1.3)
CREAT SERPL-MCNC: 0.37 MG/DL — LOW (ref 0.5–1.3)
EOSINOPHIL # BLD AUTO: 0.02 K/UL — SIGNIFICANT CHANGE UP (ref 0–0.5)
EOSINOPHIL NFR BLD AUTO: 0.1 % — SIGNIFICANT CHANGE UP (ref 0–6)
FACT II CIRC INHIB PPP QL: 14.5 SEC — HIGH (ref 9.8–13.1)
FACT II CIRC INHIB PPP QL: SIGNIFICANT CHANGE UP SEC (ref 27.5–37.4)
FERRITIN SERPL-MCNC: 570.1 NG/ML — HIGH (ref 15–150)
GLUCOSE SERPL-MCNC: 348 MG/DL — HIGH (ref 70–99)
GLUCOSE SERPL-MCNC: 348 MG/DL — HIGH (ref 70–99)
HAPTOGLOB SERPL-MCNC: 31 MG/DL — LOW (ref 34–200)
HBA1C BLD-MCNC: 3.5 % — LOW (ref 4–5.6)
HCT VFR BLD CALC: 22.2 % — LOW (ref 34.5–45)
HCT VFR BLD CALC: 22.6 % — LOW (ref 34.5–45)
HGB BLD-MCNC: 7.1 G/DL — LOW (ref 11.5–15.5)
HGB BLD-MCNC: 7.1 G/DL — LOW (ref 11.5–15.5)
IMM GRANULOCYTES NFR BLD AUTO: 1.4 % — SIGNIFICANT CHANGE UP (ref 0–1.5)
INR BLD: 2.25 — HIGH (ref 0.88–1.17)
INR BLD: 2.25 — HIGH (ref 0.88–1.17)
IRON SATN MFR SERPL: 119 UG/DL — LOW (ref 140–530)
IRON SATN MFR SERPL: 22 UG/DL — LOW (ref 30–160)
LDH SERPL L TO P-CCNC: 199 U/L — SIGNIFICANT CHANGE UP (ref 135–225)
LYMPHOCYTES # BLD AUTO: 0.29 K/UL — LOW (ref 1–3.3)
LYMPHOCYTES # BLD AUTO: 1.2 % — LOW (ref 13–44)
MAGNESIUM SERPL-MCNC: 1.3 MG/DL — LOW (ref 1.6–2.6)
MANUAL SMEAR VERIFICATION: SIGNIFICANT CHANGE UP
MCHC RBC-ENTMCNC: 26.6 PG — LOW (ref 27–34)
MCHC RBC-ENTMCNC: 26.8 PG — LOW (ref 27–34)
MCHC RBC-ENTMCNC: 31.4 % — LOW (ref 32–36)
MCHC RBC-ENTMCNC: 32 % — SIGNIFICANT CHANGE UP (ref 32–36)
MCV RBC AUTO: 83.1 FL — SIGNIFICANT CHANGE UP (ref 80–100)
MCV RBC AUTO: 85.3 FL — SIGNIFICANT CHANGE UP (ref 80–100)
MONOCYTES # BLD AUTO: 1.12 K/UL — HIGH (ref 0–0.9)
MONOCYTES NFR BLD AUTO: 4.7 % — SIGNIFICANT CHANGE UP (ref 2–14)
NEUTROPHILS # BLD AUTO: 22.2 K/UL — HIGH (ref 1.8–7.4)
NEUTROPHILS NFR BLD AUTO: 92.5 % — HIGH (ref 43–77)
NRBC # FLD: 0.06 K/UL — SIGNIFICANT CHANGE UP (ref 0–0)
NRBC # FLD: 0.07 K/UL — SIGNIFICANT CHANGE UP (ref 0–0)
PHOSPHATE SERPL-MCNC: 1.9 MG/DL — LOW (ref 2.5–4.5)
PLATELET # BLD AUTO: 211 K/UL — SIGNIFICANT CHANGE UP (ref 150–400)
PLATELET # BLD AUTO: 215 K/UL — SIGNIFICANT CHANGE UP (ref 150–400)
PMV BLD: 10.5 FL — SIGNIFICANT CHANGE UP (ref 7–13)
PMV BLD: 10.7 FL — SIGNIFICANT CHANGE UP (ref 7–13)
POTASSIUM SERPL-MCNC: 2.9 MMOL/L — CRITICAL LOW (ref 3.5–5.3)
POTASSIUM SERPL-MCNC: 2.9 MMOL/L — CRITICAL LOW (ref 3.5–5.3)
POTASSIUM SERPL-SCNC: 2.9 MMOL/L — CRITICAL LOW (ref 3.5–5.3)
POTASSIUM SERPL-SCNC: 2.9 MMOL/L — CRITICAL LOW (ref 3.5–5.3)
PROT SERPL-MCNC: 5.4 G/DL — LOW (ref 6–8.3)
PROT SERPL-MCNC: 5.4 G/DL — LOW (ref 6–8.3)
PROTHROM AB SERPL-ACNC: 26.3 SEC — HIGH (ref 9.8–13.1)
PROTHROM AB SERPL-ACNC: 26.3 SEC — HIGH (ref 9.8–13.1)
PROTHROMBIN TIME/NOMAL: 11.8 SEC — SIGNIFICANT CHANGE UP (ref 9.8–13.1)
PT INHIB SC 2 HR: 16.5 SEC — HIGH (ref 9.8–13.1)
RBC # BLD: 2.65 M/UL — LOW (ref 3.8–5.2)
RBC # BLD: 2.67 M/UL — LOW (ref 3.8–5.2)
RBC # FLD: 25.1 % — HIGH (ref 10.3–14.5)
RBC # FLD: 26.1 % — HIGH (ref 10.3–14.5)
RETICS #: 380 K/UL — HIGH (ref 25–125)
RETICS/RBC NFR: 14.4 % — HIGH (ref 0.5–2.5)
RH IG SCN BLD-IMP: POSITIVE — SIGNIFICANT CHANGE UP
SODIUM SERPL-SCNC: 137 MMOL/L — SIGNIFICANT CHANGE UP (ref 135–145)
SODIUM SERPL-SCNC: 137 MMOL/L — SIGNIFICANT CHANGE UP (ref 135–145)
SPECIMEN SOURCE: SIGNIFICANT CHANGE UP
SPECIMEN SOURCE: SIGNIFICANT CHANGE UP
UIBC SERPL-MCNC: 96.5 UG/DL — LOW (ref 110–370)
WBC # BLD: 24 K/UL — HIGH (ref 3.8–10.5)
WBC # BLD: 27.69 K/UL — HIGH (ref 3.8–10.5)
WBC # FLD AUTO: 24 K/UL — HIGH (ref 3.8–10.5)
WBC # FLD AUTO: 27.69 K/UL — HIGH (ref 3.8–10.5)

## 2019-09-06 PROCEDURE — 93010 ELECTROCARDIOGRAM REPORT: CPT

## 2019-09-06 PROCEDURE — 47490 INCISION OF GALLBLADDER: CPT | Mod: GC

## 2019-09-06 PROCEDURE — 99223 1ST HOSP IP/OBS HIGH 75: CPT

## 2019-09-06 PROCEDURE — 99232 SBSQ HOSP IP/OBS MODERATE 35: CPT | Mod: GC

## 2019-09-06 PROCEDURE — 99233 SBSQ HOSP IP/OBS HIGH 50: CPT | Mod: GC

## 2019-09-06 PROCEDURE — 74181 MRI ABDOMEN W/O CONTRAST: CPT | Mod: 26

## 2019-09-06 RX ORDER — POTASSIUM CHLORIDE 20 MEQ
10 PACKET (EA) ORAL
Refills: 0 | Status: COMPLETED | OUTPATIENT
Start: 2019-09-06 | End: 2019-09-06

## 2019-09-06 RX ORDER — PANTOPRAZOLE SODIUM 20 MG/1
40 TABLET, DELAYED RELEASE ORAL DAILY
Refills: 0 | Status: DISCONTINUED | OUTPATIENT
Start: 2019-09-06 | End: 2019-09-06

## 2019-09-06 RX ORDER — ACETAMINOPHEN 500 MG
1000 TABLET ORAL ONCE
Refills: 0 | Status: COMPLETED | OUTPATIENT
Start: 2019-09-07 | End: 2019-09-07

## 2019-09-06 RX ORDER — MAGNESIUM SULFATE 500 MG/ML
1 VIAL (ML) INJECTION ONCE
Refills: 0 | Status: COMPLETED | OUTPATIENT
Start: 2019-09-06 | End: 2019-09-06

## 2019-09-06 RX ORDER — ACETAMINOPHEN 500 MG
1000 TABLET ORAL ONCE
Refills: 0 | Status: COMPLETED | OUTPATIENT
Start: 2019-09-06 | End: 2019-09-06

## 2019-09-06 RX ORDER — POTASSIUM PHOSPHATE, MONOBASIC POTASSIUM PHOSPHATE, DIBASIC 236; 224 MG/ML; MG/ML
15 INJECTION, SOLUTION INTRAVENOUS ONCE
Refills: 0 | Status: COMPLETED | OUTPATIENT
Start: 2019-09-06 | End: 2019-09-06

## 2019-09-06 RX ORDER — PANTOPRAZOLE SODIUM 20 MG/1
40 TABLET, DELAYED RELEASE ORAL DAILY
Refills: 0 | Status: DISCONTINUED | OUTPATIENT
Start: 2019-09-06 | End: 2019-09-12

## 2019-09-06 RX ORDER — SODIUM CHLORIDE 9 MG/ML
1000 INJECTION INTRAMUSCULAR; INTRAVENOUS; SUBCUTANEOUS ONCE
Refills: 0 | Status: COMPLETED | OUTPATIENT
Start: 2019-09-06 | End: 2019-09-06

## 2019-09-06 RX ORDER — POTASSIUM CHLORIDE 20 MEQ
20 PACKET (EA) ORAL
Refills: 0 | Status: DISCONTINUED | OUTPATIENT
Start: 2019-09-06 | End: 2019-09-06

## 2019-09-06 RX ORDER — CALCIUM GLUCONATE 100 MG/ML
1 VIAL (ML) INTRAVENOUS ONCE
Refills: 0 | Status: COMPLETED | OUTPATIENT
Start: 2019-09-06 | End: 2019-09-06

## 2019-09-06 RX ORDER — HYDROMORPHONE HYDROCHLORIDE 2 MG/ML
1 INJECTION INTRAMUSCULAR; INTRAVENOUS; SUBCUTANEOUS EVERY 4 HOURS
Refills: 0 | Status: DISCONTINUED | OUTPATIENT
Start: 2019-09-06 | End: 2019-09-08

## 2019-09-06 RX ADMIN — Medication 100 MILLIEQUIVALENT(S): at 13:45

## 2019-09-06 RX ADMIN — PIPERACILLIN AND TAZOBACTAM 25 GRAM(S): 4; .5 INJECTION, POWDER, LYOPHILIZED, FOR SOLUTION INTRAVENOUS at 06:13

## 2019-09-06 RX ADMIN — HYDROMORPHONE HYDROCHLORIDE 1 MILLIGRAM(S): 2 INJECTION INTRAMUSCULAR; INTRAVENOUS; SUBCUTANEOUS at 14:11

## 2019-09-06 RX ADMIN — HYDROMORPHONE HYDROCHLORIDE 1 MILLIGRAM(S): 2 INJECTION INTRAMUSCULAR; INTRAVENOUS; SUBCUTANEOUS at 13:43

## 2019-09-06 RX ADMIN — HYDROMORPHONE HYDROCHLORIDE 1 MILLIGRAM(S): 2 INJECTION INTRAMUSCULAR; INTRAVENOUS; SUBCUTANEOUS at 18:09

## 2019-09-06 RX ADMIN — Medication 100 MILLIEQUIVALENT(S): at 13:46

## 2019-09-06 RX ADMIN — HYDROMORPHONE HYDROCHLORIDE 1 MILLIGRAM(S): 2 INJECTION INTRAMUSCULAR; INTRAVENOUS; SUBCUTANEOUS at 21:54

## 2019-09-06 RX ADMIN — Medication 200 GRAM(S): at 13:49

## 2019-09-06 RX ADMIN — Medication 100 MILLIEQUIVALENT(S): at 10:38

## 2019-09-06 RX ADMIN — Medication 1000 MILLIGRAM(S): at 16:46

## 2019-09-06 RX ADMIN — SODIUM CHLORIDE 100 MILLILITER(S): 9 INJECTION INTRAMUSCULAR; INTRAVENOUS; SUBCUTANEOUS at 18:10

## 2019-09-06 RX ADMIN — HYDROMORPHONE HYDROCHLORIDE 1 MILLIGRAM(S): 2 INJECTION INTRAMUSCULAR; INTRAVENOUS; SUBCUTANEOUS at 22:10

## 2019-09-06 RX ADMIN — HYDROMORPHONE HYDROCHLORIDE 0.5 MILLIGRAM(S): 2 INJECTION INTRAMUSCULAR; INTRAVENOUS; SUBCUTANEOUS at 11:18

## 2019-09-06 RX ADMIN — SODIUM CHLORIDE 1000 MILLILITER(S): 9 INJECTION INTRAMUSCULAR; INTRAVENOUS; SUBCUTANEOUS at 23:25

## 2019-09-06 RX ADMIN — Medication 1000 MILLIGRAM(S): at 02:15

## 2019-09-06 RX ADMIN — PANTOPRAZOLE SODIUM 40 MILLIGRAM(S): 20 TABLET, DELAYED RELEASE ORAL at 13:48

## 2019-09-06 RX ADMIN — HYDROMORPHONE HYDROCHLORIDE 0.5 MILLIGRAM(S): 2 INJECTION INTRAMUSCULAR; INTRAVENOUS; SUBCUTANEOUS at 07:00

## 2019-09-06 RX ADMIN — HYDROMORPHONE HYDROCHLORIDE 0.5 MILLIGRAM(S): 2 INJECTION INTRAMUSCULAR; INTRAVENOUS; SUBCUTANEOUS at 10:37

## 2019-09-06 RX ADMIN — Medication 100 GRAM(S): at 16:09

## 2019-09-06 RX ADMIN — SODIUM CHLORIDE 100 MILLILITER(S): 9 INJECTION INTRAMUSCULAR; INTRAVENOUS; SUBCUTANEOUS at 21:54

## 2019-09-06 RX ADMIN — HYDROMORPHONE HYDROCHLORIDE 1 MILLIGRAM(S): 2 INJECTION INTRAMUSCULAR; INTRAVENOUS; SUBCUTANEOUS at 18:33

## 2019-09-06 RX ADMIN — PIPERACILLIN AND TAZOBACTAM 25 GRAM(S): 4; .5 INJECTION, POWDER, LYOPHILIZED, FOR SOLUTION INTRAVENOUS at 21:54

## 2019-09-06 RX ADMIN — Medication 400 MILLIGRAM(S): at 01:58

## 2019-09-06 RX ADMIN — PANTOPRAZOLE SODIUM 40 MILLIGRAM(S): 20 TABLET, DELAYED RELEASE ORAL at 06:13

## 2019-09-06 RX ADMIN — Medication 400 MILLIGRAM(S): at 23:43

## 2019-09-06 RX ADMIN — Medication 400 MILLIGRAM(S): at 16:06

## 2019-09-06 RX ADMIN — HYDROMORPHONE HYDROCHLORIDE 0.5 MILLIGRAM(S): 2 INJECTION INTRAMUSCULAR; INTRAVENOUS; SUBCUTANEOUS at 06:13

## 2019-09-06 RX ADMIN — PIPERACILLIN AND TAZOBACTAM 25 GRAM(S): 4; .5 INJECTION, POWDER, LYOPHILIZED, FOR SOLUTION INTRAVENOUS at 13:49

## 2019-09-06 NOTE — CONSULT NOTE ADULT - SUBJECTIVE AND OBJECTIVE BOX
Hematology/Oncology Consult Note    HPI:  40 Yo f hx of hereditary spherocytosis, Mirizzi syndrome, gallstone pancreatitis, cholecystitis presenting w/ progressively worsening abdominal pain x 1 week and was admitted for acute cholecystitis. Hematology was consulted for hemolytic anemia and hereditary spherocytosis.             Per patient, pain is sharp, 10/10 in severity and located in the right upper quadrant w/ radiation to the epigastric region and back. ROS positive for nausea w/ associated vomiting ("green w/ mucus"), decreased appetite and fatigue. Pt denies fevers, chills, CP, SOB, const/diarrhea, urinary changes, weight loss, skin rashes/lesions, recent travel sick contact. Of note patient was recently admitted at this facility 2019 for acute cholecystitis and gallstone pancreatitis. Patient underwent ERCP w/  sphincterotomy and stent placement, w/ balloon sweep and removal of multiple dark stones.  Cholangiogram sig for mild CHD stricture but no fistula. 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    In the E.D vitals were sig for tachycardia to 106. Labs sig for WBC to 23.28. Elevated T bili to 23.5. Direct bili 1.6. Lipase 110.9. AB US sig for acute cholecystitis. Patient given 1L NS started on zosyn. (05 Sep 2019 12:07)      PAST MEDICAL & SURGICAL HISTORY:  IUP (intrauterine pregnancy), incidental: 16 weeks  Mucous polyp of cervix  Hereditary spherocytosis   history:       FAMILY HISTORY:  Family history of hypertension  Family history of hereditary spherocytosis      MEDICATIONS  (STANDING):  calcium gluconate IVPB 1 Gram(s) IV Intermittent once  magnesium sulfate  IVPB 1 Gram(s) IV Intermittent once  pantoprazole  Injectable 40 milliGRAM(s) IV Push daily  piperacillin/tazobactam IVPB.. 3.375 Gram(s) IV Intermittent every 8 hours  potassium chloride  10 mEq/100 mL IVPB 10 milliEquivalent(s) IV Intermittent every 1 hour  potassium chloride  10 mEq/100 mL IVPB 10 milliEquivalent(s) IV Intermittent every 1 hour  potassium phosphate IVPB 15 milliMole(s) IV Intermittent once  sodium chloride 0.9%. 1000 milliLiter(s) (100 mL/Hr) IV Continuous <Continuous>    MEDICATIONS  (PRN):  acetaminophen   Tablet .. 650 milliGRAM(s) Oral every 6 hours PRN Temp greater or equal to 38C (100.4F), Mild Pain (1 - 3)  HYDROmorphone  Injectable 0.5 milliGRAM(s) IV Push every 4 hours PRN Moderate and Severe Pain (4 - 10)  ondansetron Injectable 4 milliGRAM(s) IV Push every 8 hours PRN Nausea and/or Vomiting      Allergies    No Known Allergies    Intolerances        SOCIAL HISTORY:   EtOH:  Tobacco:    REVIEW OF SYSTEMS:  General: Negative for weakness, fatigue, malaise, chills, fever, night sweats, unintentional weight change.  Head: Negative for HA  Eyes: Negative for vision changes, diplopia, impaired vision.  ENT: Negative for tinnitus, vertigo, hearing impairment, postnasal drip, sore throat.  Respiratory: Negative for shortness of breath, cugh, sputum.  Cardiovascular: Negative for chest pain, dyspnea on exertion, orthopnea, PND, palpations, edema.  Gastrointestinal: Negative for dysphagia, nausea, vomiting, hematemesis, abdominal pain, diarrhea, constipation, hematochezia, tarry stool.  MSK: Negative for myalgia, joint pain, neck stiffness, weakness, back pain.  Neuro: Negative for weakness, loss of balance.   Skin: Negative for rash, erythema.  Psych: Negative for anxiety, depression, sleep disturbance.    VITAL SIGNS:  Height (cm): 162.6 ( @ 12:29)  Weight (kg): 67.8 ( @ :29)  BMI (kg/m2): 25.6 (:)  BSA (m2): 1.73 ( @ 12:29)  T(F): 98.8 (19 @ 06:11), Max: 99.7 (19 @ 21:50)  HR: 109 (19 @ 06:11)  BP: 114/65 (19 @ 06:11)  RR: 16 (19 @ 06:11)  SpO2: 100% (19 @ 06:11)  Wt(kg): --    PHYSICAL EXAMINATION:  Constitutional: NAD   Eyes: PERRLA, EOMI, sclera non-icteric, conjunctiva non-anemia  Neck: supple, no masses, no elevated JVP  Mouth: No mucosisit, no exudate, no ulcer.   Respiratory: CTA b/l  Cardiovascular: Normal rate regular rhythm. normal S1S2, no murmur  Gastrointestinal: soft and flat, no tenderness to palpation, no masses palpable, normoactive bowel sound, no HSM  Extremities:  No c/c/e  MSK: No joint swelling, erythema, or tenderness   Neurological: AOx3, Cranial nervess II-XII grossly intact  Skin: No rash  Psych: Normal affect    LABS:                        7.1    24.00 )-----------( 215      ( 06 Sep 2019 07:35 )             22.6         137  |  102  |  7   ----------------------------<  348<H>  2.9<LL>   |  20<L>  |  0.37<L>    Ca    7.1<L>      06 Sep 2019 07:35  Phos  1.9       Mg     1.3         TPro  5.4<L>  /  Alb  3.2<L>  /  TBili  19.6<H>  /  DBili  5.0<H>  /  AST  16  /  ALT  15  /  AlkPhos  56      PT/INR - ( 06 Sep 2019 07:35 )   PT: 26.3 SEC;   INR: 2.25          PTT - ( 06 Sep 2019 07:35 )  PTT:30.0 SEC Lactate Dehydrogenase, Serum: 199 U/L ( @ 07:35)  Haptoglobin, Serum: 31 mg/dL ( @ 07:35)  Phosphorus Level, Serum: 1.9 mg/dL ( @ 07:35)  Magnesium, Serum: 1.3 mg/dL ( @ 07:35)        RADIOLOGY & ADDITIONAL TESTS:  < from: US Abdomen Complete (19 @ 06:59) >  Gallbladder: Cholelithiasis and biliary sludge. There is gallbladder wall   thickening to 5 mm. Trace pericholecystic fluid is questioned.   Sonographic Wheeler's sign is reported as positive.    Pancreas: Visualized portions are within normal limits.    Spleen: Splenomegaly, as at recent MR. The spleen measures 18.3 cm at   sonography.    < end of copied text > Hematology/Oncology Consult Note    HPI:  40 Yo f hx of hereditary spherocytosis, Mirizzi syndrome, gallstone pancreatitis s/p sphincterotomy and stent placement, presenting w/ progressively worsening abdominal pain x 1 week and was admitted for acute cholecystitis. Hematology was consulted for hemolytic anemia and hereditary spherocytosis.     she was originally diagnosed when she was 8-9 years old. Since then, she has not seen any hematoliogist until  when she saw Dr. Solorio. At that time, she was noted to have splenomegaly and cholelithiasis but asymptomatic. She saw another hematologist in [facility] about 3 years ago and also at that time she was completely asymptomatic and there was not treatment recommendations besides continuing to folic acid. She lost follow-up with hematologist after that and she presented to gall stone pancreatitis in 2019. She had sphincterotomy and stent placement and was discharged. Now she was admitted for cholecystitis.     She was never admitted to hospital for this issue and had any complications until 2019.    PAST MEDICAL & SURGICAL HISTORY:  IUP (intrauterine pregnancy), incidental: 16 weeks  Mucous polyp of cervix  Hereditary spherocytosis   history:       FAMILY HISTORY:  Family history of hypertension  Family history of hereditary spherocytosis      MEDICATIONS  (STANDING):  calcium gluconate IVPB 1 Gram(s) IV Intermittent once  magnesium sulfate  IVPB 1 Gram(s) IV Intermittent once  pantoprazole  Injectable 40 milliGRAM(s) IV Push daily  piperacillin/tazobactam IVPB.. 3.375 Gram(s) IV Intermittent every 8 hours  potassium chloride  10 mEq/100 mL IVPB 10 milliEquivalent(s) IV Intermittent every 1 hour  potassium chloride  10 mEq/100 mL IVPB 10 milliEquivalent(s) IV Intermittent every 1 hour  potassium phosphate IVPB 15 milliMole(s) IV Intermittent once  sodium chloride 0.9%. 1000 milliLiter(s) (100 mL/Hr) IV Continuous <Continuous>    MEDICATIONS  (PRN):  acetaminophen   Tablet .. 650 milliGRAM(s) Oral every 6 hours PRN Temp greater or equal to 38C (100.4F), Mild Pain (1 - 3)  HYDROmorphone  Injectable 0.5 milliGRAM(s) IV Push every 4 hours PRN Moderate and Severe Pain (4 - 10)  ondansetron Injectable 4 milliGRAM(s) IV Push every 8 hours PRN Nausea and/or Vomiting      Allergies    No Known Allergies      REVIEW OF SYSTEMS:  General: Negative for night sweats. POSITIVE FOR fatigue and fever.  Head: Negative for HA  Eyes: Negative for vision changes, diplopia, impaired vision.  ENT: Negative for tinnitus, vertigo, hearing impairment, postnasal drip, sore throat.  Respiratory: Negative for shortness of breath  Cardiovascular: Negative for chest pain, dyspnea on exertion.  Gastrointestinal: Negative for dysphagia, Positive for nausea and abdominal pain.   MSK: Negative for myalgia, joint pain, neck stiffness, weakness, back pain.  Neuro: Negative for weakness, loss of balance.   Skin: Negative for rash, erythema.  Psych: Negative for anxiety, depression, sleep disturbance.    VITAL SIGNS:  Height (cm): 162.6 ( @ 12:29)  Weight (kg): 67.8 (:)  BMI (kg/m2): 25.6 ( @ :)  BSA (m2): 1.73 ( 12:29)  T(F): 98.8 (19 @ 06:11), Max: 99.7 (19 @ 21:50)  HR: 109 (19 @ 06:11)  BP: 114/65 (19 @ 06:11)  RR: 16 (19 @ 06:11)  SpO2: 100% (19 @ 06:11)  Wt(kg): --    PHYSICAL EXAMINATION:  Constitutional: NAD   Eyes: sclera-icteric, conjunctiva-anemia  Neck: supple, no masses, no elevated JVP  Mouth: Dry oral mucosa.   Respiratory: CTA b/l  Cardiovascular: Normal rate regular rhythm. no murmur  Gastrointestinal: soft and flat, tenderness on RUQ. +SM  Extremities:  No c/c/e  MSK: No joint swelling, erythema, or tenderness   Neurological: AOx3, Cranial nerves II-XII grossly intact  Skin: No rash  Psych: Normal affect    LABS:                        7.1    24.00 )-----------( 215      ( 06 Sep 2019 07:35 )             22.6         137  |  102  |  7   ----------------------------<  348<H>  2.9<LL>   |  20<L>  |  0.37<L>    Ca    7.1<L>      06 Sep 2019 07:35  Phos  1.9       Mg     1.3         TPro  5.4<L>  /  Alb  3.2<L>  /  TBili  19.6<H>  /  DBili  5.0<H>  /  AST  16  /  ALT  15  /  AlkPhos  56  -    PT/INR - ( 06 Sep 2019 07:35 )   PT: 26.3 SEC;   INR: 2.25          PTT - ( 06 Sep 2019 07:35 )  PTT:30.0 SEC Lactate Dehydrogenase, Serum: 199 U/L ( @ 07:35)  Haptoglobin, Serum: 31 mg/dL ( @ 07:35)  Phosphorus Level, Serum: 1.9 mg/dL ( @ 07:35)  Magnesium, Serum: 1.3 mg/dL ( @ 07:35)        RADIOLOGY & ADDITIONAL TESTS:  < from: US Abdomen Complete (19 @ 06:59) >  Gallbladder: Cholelithiasis and biliary sludge. There is gallbladder wall   thickening to 5 mm. Trace pericholecystic fluid is questioned.   Sonographic Wheeler's sign is reported as positive.    Pancreas: Visualized portions are within normal limits.    Spleen: Splenomegaly, as at recent MR. The spleen measures 18.3 cm at   sonography.    < end of copied text >

## 2019-09-06 NOTE — PROGRESS NOTE ADULT - SUBJECTIVE AND OBJECTIVE BOX
Interventional Radiology Brief- Operative Note    Procedure: Image guided percutaneous cholecystostomy tube placement.    Operators: Tee Luna; Narayan Mcneill     Anesthesia (type): Local     Contrast:  None    EBL: Minimal    Findings/Follow up Plan of Care:  100 cc of purulent fluid were aspirated.     Specimens Removed: 100 cc purulent fluid    Implants: 8.5 Ukrainian pigtail catheter.    Complications: None    Condition/Disposition: Stable to recovery.    Please call Interventional Radiology x [phone] with any questions, concerns, or issues.

## 2019-09-06 NOTE — PROGRESS NOTE ADULT - ASSESSMENT
38 Yo f hx of hereditary spherocytosis, Mirizzi syndrome, gallstone pancreatitis, cholecystitis presenting w/ progressively worsening abdominal pain x 1 week, admitted for acute cholecystitis

## 2019-09-06 NOTE — PROGRESS NOTE ADULT - PROBLEM SELECTOR PLAN 3
-Patient takes folic acid at home, will continue home meds   -Hgb 9.1, likely secondary to hemolysis . Anemia is normocytic  -Haptoglobin 07/19 < 20  -F/u LDH, Haptoglobin and reticulocyte count  -Maintain active type and screen  -Transfuse Hgb < 7 -Patient takes folic acid at home, will continue home meds   -Hgb 9.1, likely secondary to hemolysis . Anemia is normocytic  -Haptoglobin 31, reticulocytes inc  -Hematology consulted; appreciate recs   -Maintain active type and screen  -Transfuse Hgb < 7

## 2019-09-06 NOTE — PROGRESS NOTE ADULT - PROBLEM SELECTOR PLAN 1
-Admission 07/2019 for acute cholecystitis and gallstone pancreatitis. Underwent ERCP w/  sphincterotomy and stent placement, w/ balloon sweep and removal of multiple dark stones.  Cholangiogram sig for mild CHD stricture but no fistula. 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  -AB US at this time sig for acute cholecystitis   -c/w zosyn (09/05-  -c/w tylenol PRN  -c/w dilaudid 0.5 q4 PRN  -c/w gentle hydration  -GI consulted; pending MRCP to r/o CBD stones  -Surgery consulted  -Will trend bilirubin; Total bili elevated to 23.5. Direct bili 1.6  -NPO for now -Admission 07/2019 for acute cholecystitis and gallstone pancreatitis. Underwent ERCP w/  sphincterotomy and stent placement, w/ balloon sweep and removal of multiple dark stones.  Cholangiogram sig for mild CHD stricture but no fistula. 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  -AB US at this time sig for acute cholecystitis   -c/w zosyn (09/05-  -c/w tylenol PRN  -c/w dilaudid 0.5 q4 PRN  -c/w gentle hydration  -GI consulted; pending MRCP to r/o CBD stones  -Surgery consulted  -Will trend bilirubin;  -NPO for now -Admission 07/2019 for acute cholecystitis and gallstone pancreatitis. Underwent ERCP w/  sphincterotomy and stent placement, w/ balloon sweep and removal of multiple dark stones.  Cholangiogram sig for mild CHD stricture but no fistula. 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  -AB US at this time sig for acute cholecystitis   -S/p MRCP today  -c/w zosyn (09/05-  -c/w tylenol PRN  -c/w dilaudid 0.5 q4 PRN  -c/w gentle hydration  -F/u GI recs  -F/u surgery recs  -Total bilirubin downtrending. T bili however uptrending concerning for CBD obstruction. May require percutaneous cholecystostomy tube for decompression/source control   -NPO for now

## 2019-09-06 NOTE — CHART NOTE - NSCHARTNOTEFT_GEN_A_CORE
Pre-Interventional Radiology Procedure Note    THEO GARBER | [id number]    19 @ 17:55    Interventional Radiology Attending Physician: Dr. Rodrigues    Ordering Attending Physician: Khalida Milligan    Diagnosis/Indication: Patient is a 39y old  Female who presents with a chief complaint of Cholecystitis (06 Sep 2019 11:46)      Procedure: Perc Megha    39y    Female    PAST MEDICAL & SURGICAL HISTORY:  IUP (intrauterine pregnancy), incidental: 16 weeks  Mucous polyp of cervix  Hereditary spherocytosis   history:        CBC Full  -  ( 06 Sep 2019 16:40 )  WBC Count : 27.69 K/uL  RBC Count : 2.67 M/uL  Hemoglobin : 7.1 g/dL  Hematocrit : 22.2 %  Platelet Count - Automated : 211 K/uL  Mean Cell Volume : 83.1 fL  Mean Cell Hemoglobin : 26.6 pg  Mean Cell Hemoglobin Concentration : 32.0 %  Auto Neutrophil # : x  Auto Lymphocyte # : x  Auto Monocyte # : x  Auto Eosinophil # : x  Auto Basophil # : x  Auto Neutrophil % : x  Auto Lymphocyte % : x  Auto Monocyte % : x  Auto Eosinophil % : x  Auto Basophil % : x        137  |  102  |  7   ----------------------------<  348<H>  2.9<LL>   |  20<L>  |  0.37<L>    Ca    7.1<L>      06 Sep 2019 07:35  Phos  1.9       Mg     1.3         TPro  5.4<L>  /  Alb  3.2<L>  /  TBili  19.6<H>  /  DBili  5.0<H>  /  AST  16  /  ALT  15  /  AlkPhos  56  09-06    PT/INR - ( 06 Sep 2019 07:35 )   PT: 26.3 SEC;   INR: 2.25          PTT - ( 06 Sep 2019 07:35 )  PTT:30.0 SEC

## 2019-09-06 NOTE — PROGRESS NOTE ADULT - ASSESSMENT
38 yo female with acute cholecystitis complicated by hyperbilirubinemia (T. bili 23.5, direct 1.6) likely secondary to hemolysis from hereditary spherocytosis     - will discuss surgery plan when Bilirubin level decreases  - continue antibiotic (IV zosyn), NPO and IVF, if bilirubin persistently elevated, may consider IR for percutaneous cholecystostomy tube for decompression/source control

## 2019-09-06 NOTE — PROGRESS NOTE ADULT - SUBJECTIVE AND OBJECTIVE BOX
Surgery B Team Daily Progress Note    SUBJECTIVE: Patient seen and examined during the morning round, still endorsing pain but denies fever     OBJECTIVE:   Vital Signs Last 24 Hrs  T(C): 37.1 (06 Sep 2019 06:11), Max: 37.6 (05 Sep 2019 21:50)  T(F): 98.8 (06 Sep 2019 06:11), Max: 99.7 (05 Sep 2019 21:50)  HR: 109 (06 Sep 2019 06:11) (88 - 115)  BP: 114/65 (06 Sep 2019 06:11) (110/72 - 138/76)  BP(mean): --  RR: 16 (06 Sep 2019 06:11) (16 - 19)  SpO2: 100% (06 Sep 2019 06:11) (100% - 100%)    PHYSICAL EXAM:  Constitutional: resting in bed with no acute distress  Respiratory:  unlabored breathing on room air   Gastrointestinal: Abdomen soft, non distended, tender over right upper quadrant   Extremities:  No edema, no calf tenderness    RADIOLOGY & ADDITIONAL STUDIES: no new studies performed

## 2019-09-06 NOTE — PROGRESS NOTE ADULT - SUBJECTIVE AND OBJECTIVE BOX
Skye Harrington MD  PGY 1 Department of Internal Medicine  Pager: [phone]    39y old  Female who presents with a chief complaint of Cholecystitis       SUBJECTIVE / OVERNIGHT EVENTS: Pt seen and examined. Complaining of pain overnight. Tachy to 115. Started on standing tylenol IV overnight.    [  ]Fever [  ]Chills  [  ]Nausea  [  ]Vomiting  [  ]Chest Pain [  ]SOB  [  ]Palpitations  [  ]Abdominal Pain  [  ]Numbness/tingling  [   ]Diarrhea   [   ]Constipation   [  ]New joint aches     MEDICATIONS  (STANDING):  acetaminophen  IVPB .. 1000 milliGRAM(s) IV Intermittent once  pantoprazole    Tablet 40 milliGRAM(s) Oral before breakfast  piperacillin/tazobactam IVPB.. 3.375 Gram(s) IV Intermittent every 8 hours  sodium chloride 0.9%. 1000 milliLiter(s) (100 mL/Hr) IV Continuous <Continuous>    MEDICATIONS  (PRN):  acetaminophen   Tablet .. 650 milliGRAM(s) Oral every 6 hours PRN Temp greater or equal to 38C (100.4F), Mild Pain (1 - 3)  HYDROmorphone  Injectable 0.5 milliGRAM(s) IV Push every 4 hours PRN Moderate and Severe Pain (4 - 10)  ondansetron Injectable 4 milliGRAM(s) IV Push every 8 hours PRN Nausea and/or Vomiting      I&O's Summary    05 Sep 2019 07:01  -  06 Sep 2019 06:24  --------------------------------------------------------  IN: 200 mL / OUT: 0 mL / NET: 200 mL        Vital Signs Last 24 Hrs  T(C): 37.1 (06 Sep 2019 06:11), Max: 37.6 (05 Sep 2019 21:50)  T(F): 98.8 (06 Sep 2019 06:11), Max: 99.7 (05 Sep 2019 21:50)  HR: 109 (06 Sep 2019 06:11) (88 - 115)  BP: 114/65 (06 Sep 2019 06:11) (110/72 - 138/76)  BP(mean): --  RR: 16 (06 Sep 2019 06:11) (16 - 19)  SpO2: 100% (06 Sep 2019 06:11) (100% - 100%)    CAPILLARY BLOOD GLUCOSE                      PHYSICAL EXAM:              GENERAL APPEARANCE: NAD  	HEENT:  PERRL, EOMI. Conjunctiva clear   	NECK: Neck supple, non-tender without lymphadenopathy  	CARDIAC: RRR, S1S2 normal, no murmurs, rubs or gallops  	LUNGS: CTAB, no wheezing, no rales  	ABDOMEN: TTP RUQ, + Bowel sounds, Negative Wheeler's sign, No guarding or rebound. No peritoneal signs  	MUSCULOSKELETAL: ROM intact  	EXTREMITIES: No significant deformity or joint abnormality. No edema. Peripheral pulses intact.   	NEUROLOGICAL: Strength and sensation symmetric and intact throughout.   	SKIN: Warm and dry.               PSYCHIATRIC: AOx3. Normal affect and behavior.       LABS:                        9.1    23.28 )-----------( 252      ( 05 Sep 2019 04:30 )             27.2     Auto Eosinophil # 0.00  / Auto Eosinophil % 0.0   / Auto Neutrophil # 21.66 / Auto Neutrophil % 93.0  / BANDS % 0        09-05    142  |  102  |  9   ----------------------------<  163<H>  3.4<L>   |  22  |  0.50    Ca    9.9      05 Sep 2019 04:30  TPro  x   /  Alb  x   /  TBili  x   /  DBili  1.6<H>  /  AST  x   /  ALT  x   /  AlkPhos  x   09-05  TPro  7.6  /  Alb  5.1<H>  /  TBili  23.5<H>  /  DBili  x   /  AST  13  /  ALT  10  /  AlkPhos  73  09-05                  RADIOLOGY & ADDITIONAL TESTS:    Imaging Personally Reviewed:    Consultant(s) Notes Reviewed:      Care Discussed with Consultants/Other Providers: Skye Harrington MD  PGY 1 Department of Internal Medicine  Pager: [phone]    39y old  Female who presents with a chief complaint of Cholecystitis       SUBJECTIVE / OVERNIGHT EVENTS: Pt seen and examined. Complaining of pain overnight. Tachy to 115. Started on standing tylenol IV overnight. This AM reports abdominal pain, mildly improved from admission. Denies further episodes of nausea/vomiting. ROS otherwise negative.    ROS:     [  ]Fever [  ]Chills  [  ]Nausea  [  ]Vomiting  [  ]Chest Pain [  ]SOB  [  ]Palpitations  [  ]Abdominal Pain  [  ]Numbness/tingling  [   ]Diarrhea   [   ]Constipation   [  ]New joint aches     MEDICATIONS  (STANDING):  acetaminophen  IVPB .. 1000 milliGRAM(s) IV Intermittent once  pantoprazole    Tablet 40 milliGRAM(s) Oral before breakfast  piperacillin/tazobactam IVPB.. 3.375 Gram(s) IV Intermittent every 8 hours  sodium chloride 0.9%. 1000 milliLiter(s) (100 mL/Hr) IV Continuous <Continuous>    MEDICATIONS  (PRN):  acetaminophen   Tablet .. 650 milliGRAM(s) Oral every 6 hours PRN Temp greater or equal to 38C (100.4F), Mild Pain (1 - 3)  HYDROmorphone  Injectable 0.5 milliGRAM(s) IV Push every 4 hours PRN Moderate and Severe Pain (4 - 10)  ondansetron Injectable 4 milliGRAM(s) IV Push every 8 hours PRN Nausea and/or Vomiting      I&O's Summary    05 Sep 2019 07:01  -  06 Sep 2019 06:24  --------------------------------------------------------  IN: 200 mL / OUT: 0 mL / NET: 200 mL        Vital Signs Last 24 Hrs  T(C): 37.1 (06 Sep 2019 06:11), Max: 37.6 (05 Sep 2019 21:50)  T(F): 98.8 (06 Sep 2019 06:11), Max: 99.7 (05 Sep 2019 21:50)  HR: 109 (06 Sep 2019 06:11) (88 - 115)  BP: 114/65 (06 Sep 2019 06:11) (110/72 - 138/76)  BP(mean): --  RR: 16 (06 Sep 2019 06:11) (16 - 19)  SpO2: 100% (06 Sep 2019 06:11) (100% - 100%)    CAPILLARY BLOOD GLUCOSE                      PHYSICAL EXAM:              GENERAL APPEARANCE: NAD  	HEENT:  PERRL, EOMI. Conjunctiva clear   	NECK: Neck supple, non-tender without lymphadenopathy  	CARDIAC: RRR, S1S2 normal, no murmurs, rubs or gallops  	LUNGS: CTAB, no wheezing, no rales  	ABDOMEN: TTP RUQ, + Bowel sounds, Negative Wheeler's sign, No guarding or rebound. No peritoneal signs  	MUSCULOSKELETAL: ROM intact  	EXTREMITIES: No significant deformity or joint abnormality. No edema. Peripheral pulses intact.   	NEUROLOGICAL: Strength and sensation symmetric and intact throughout.   	SKIN: Warm and dry.               PSYCHIATRIC: AOx3. Normal affect and behavior.       LABS:                        9.1    23.28 )-----------( 252      ( 05 Sep 2019 04:30 )             27.2     Auto Eosinophil # 0.00  / Auto Eosinophil % 0.0   / Auto Neutrophil # 21.66 / Auto Neutrophil % 93.0  / BANDS % 0        09-05    142  |  102  |  9   ----------------------------<  163<H>  3.4<L>   |  22  |  0.50    Ca    9.9      05 Sep 2019 04:30  TPro  x   /  Alb  x   /  TBili  x   /  DBili  1.6<H>  /  AST  x   /  ALT  x   /  AlkPhos  x   09-05  TPro  7.6  /  Alb  5.1<H>  /  TBili  23.5<H>  /  DBili  x   /  AST  13  /  ALT  10  /  AlkPhos  73  09-05                  RADIOLOGY & ADDITIONAL TESTS:    Imaging Personally Reviewed:    Consultant(s) Notes Reviewed:      Care Discussed with Consultants/Other Providers: Skye Harrington MD  PGY 1 Department of Internal Medicine  Pager: [phone]    39y old  Female who presents with a chief complaint of Cholecystitis       SUBJECTIVE / OVERNIGHT EVENTS: Pt seen and examined. Complaining of pain overnight. Tachy to 115. Started on standing tylenol IV overnight. This AM reports abdominal pain, mildly improved from admission. Denies further episodes of nausea/vomiting. ROS otherwise negative.    ROS:     [  ]Fever [  ]Chills  [  ]Nausea  [  ]Vomiting  [  ]Chest Pain [  ]SOB  [  ]Palpitations  [  ]Abdominal Pain  [  ]Numbness/tingling  [   ]Diarrhea   [   ]Constipation   [  ]New joint aches     MEDICATIONS  (STANDING):  acetaminophen  IVPB .. 1000 milliGRAM(s) IV Intermittent once  pantoprazole    Tablet 40 milliGRAM(s) Oral before breakfast  piperacillin/tazobactam IVPB.. 3.375 Gram(s) IV Intermittent every 8 hours  sodium chloride 0.9%. 1000 milliLiter(s) (100 mL/Hr) IV Continuous <Continuous>    MEDICATIONS  (PRN):  acetaminophen   Tablet .. 650 milliGRAM(s) Oral every 6 hours PRN Temp greater or equal to 38C (100.4F), Mild Pain (1 - 3)  HYDROmorphone  Injectable 0.5 milliGRAM(s) IV Push every 4 hours PRN Moderate and Severe Pain (4 - 10)  ondansetron Injectable 4 milliGRAM(s) IV Push every 8 hours PRN Nausea and/or Vomiting      I&O's Summary    05 Sep 2019 07:01  -  06 Sep 2019 06:24  --------------------------------------------------------  IN: 200 mL / OUT: 0 mL / NET: 200 mL        Vital Signs Last 24 Hrs  T(C): 37.1 (06 Sep 2019 06:11), Max: 37.6 (05 Sep 2019 21:50)  T(F): 98.8 (06 Sep 2019 06:11), Max: 99.7 (05 Sep 2019 21:50)  HR: 109 (06 Sep 2019 06:11) (88 - 115)  BP: 114/65 (06 Sep 2019 06:11) (110/72 - 138/76)  BP(mean): --  RR: 16 (06 Sep 2019 06:11) (16 - 19)  SpO2: 100% (06 Sep 2019 06:11) (100% - 100%)    CAPILLARY BLOOD GLUCOSE                      PHYSICAL EXAM:              GENERAL APPEARANCE: NAD  	HEENT:  PERRL, EOMI. + scleral icterus  	NECK: Neck supple, non-tender without lymphadenopathy  	CARDIAC: RRR, S1S2 normal, no murmurs, rubs or gallops  	LUNGS: CTAB, no wheezing, no rales  	ABDOMEN: TTP RUQ, + Bowel sounds, Negative Wheeler's sign, No guarding or rebound. No peritoneal signs  	MUSCULOSKELETAL: ROM intact  	EXTREMITIES: No significant deformity or joint abnormality. No edema. Peripheral pulses intact.   	NEUROLOGICAL: Strength and sensation symmetric and intact throughout.   	SKIN: Warm and dry.               PSYCHIATRIC: AOx3. Normal affect and behavior.       LABS:                        9.1    23.28 )-----------( 252      ( 05 Sep 2019 04:30 )             27.2     Auto Eosinophil # 0.00  / Auto Eosinophil % 0.0   / Auto Neutrophil # 21.66 / Auto Neutrophil % 93.0  / BANDS % 0        09-05    142  |  102  |  9   ----------------------------<  163<H>  3.4<L>   |  22  |  0.50    Ca    9.9      05 Sep 2019 04:30  TPro  x   /  Alb  x   /  TBili  x   /  DBili  1.6<H>  /  AST  x   /  ALT  x   /  AlkPhos  x   09-05  TPro  7.6  /  Alb  5.1<H>  /  TBili  23.5<H>  /  DBili  x   /  AST  13  /  ALT  10  /  AlkPhos  73  09-05                  RADIOLOGY & ADDITIONAL TESTS:    Imaging Personally Reviewed:    Consultant(s) Notes Reviewed:      Care Discussed with Consultants/Other Providers:

## 2019-09-06 NOTE — PROGRESS NOTE ADULT - ASSESSMENT
Impression:    1. Hyperbilrubinemia, but w normal CBD on US and otherwise nomral LFTs. 23 on presentation, was 13 on hospital d/c. Ddx is hemolysis and cholestasis, less likely biliary obstruction given recent, stent, o/w normal LFTs.    2. 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 Hereditary spherocytosis    Recommendation:   -obtain an MRCP to confirm no CBD stones  -obtain a direct bilirubin, hemolysis markers

## 2019-09-06 NOTE — PROGRESS NOTE ADULT - SUBJECTIVE AND OBJECTIVE BOX
Patient is a 39y old  Female who presents with a chief complaint of Cholecystitis (06 Sep 2019 06:24)      SUBJECTIVE / OVERNIGHT EVENTS:  RUQ pain  MEDICATIONS  (STANDING):  acetaminophen  IVPB .. 1000 milliGRAM(s) IV Intermittent once  pantoprazole    Tablet 40 milliGRAM(s) Oral before breakfast  piperacillin/tazobactam IVPB.. 3.375 Gram(s) IV Intermittent every 8 hours  sodium chloride 0.9%. 1000 milliLiter(s) (100 mL/Hr) IV Continuous <Continuous>    MEDICATIONS  (PRN):  acetaminophen   Tablet .. 650 milliGRAM(s) Oral every 6 hours PRN Temp greater or equal to 38C (100.4F), Mild Pain (1 - 3)  HYDROmorphone  Injectable 0.5 milliGRAM(s) IV Push every 4 hours PRN Moderate and Severe Pain (4 - 10)  ondansetron Injectable 4 milliGRAM(s) IV Push every 8 hours PRN Nausea and/or Vomiting              PHYSICAL EXAM:  GENERAL: NAD, well-developed  HEAD:  Atraumatic, Normocephalic  EYES: EOMI, PERRLA, conjunctiva and sclera icteric  NECK: Supple, No JVD  CHEST/LUNG: Clear to auscultation bilaterally; No wheeze  HEART: Regular rate and rhythm; No murmurs, rubs, or gallops  ABDOMEN: Soft, RUQ tender, Nondistended; Bowel sounds present, no hepatosplenomegaly, no rebound or guarding  EXTREMITIES:  2+ Peripheral Pulses, No clubbing, cyanosis, or edema  PSYCH: AAOx3  NEUROLOGY: non-focal, no asterixis  SKIN: No rashes or lesion    LABS:                        9.1    23.28 )-----------( 252      ( 05 Sep 2019 04:30 )             27.2     09-05    142  |  102  |  9   ----------------------------<  163<H>  3.4<L>   |  22  |  0.50    Ca    9.9      05 Sep 2019 04:30    TPro  x   /  Alb  x   /  TBili  x   /  DBili  1.6<H>  /  AST  x   /  ALT  x   /  AlkPhos  x   09-05    LIVER FUNCTIONS - ( 05 Sep 2019 04:30 )  Alb: 5.1 g/dL / Pro: 7.6 g/dL / ALK PHOS: 73 u/L / ALT: 10 u/L / AST: 13 u/L / GGT: x                     RADIOLOGY & ADDITIONAL TESTS:

## 2019-09-06 NOTE — CONSULT NOTE ADULT - ASSESSMENT
# Hereditary spherocytosis     # Hemolysis   Splenomegaly 18.3cm by ultrasound          # Cholecystitis 38 Yo f hx of hereditary spherocytosis, Mirizzi syndrome, gallstone pancreatitis s/p sphincterotomy and stent placement, presenting w/ progressively worsening abdominal pain x 1 week and was admitted for acute cholecystitis. Hematology was consulted for hemolytic anemia and hereditary spherocytosis.     # Hereditary spherocytosis   # Cholelithiasis   # Splenomegaly  # Hemolysis   Splenomegaly 18.3cm by ultrasound. She has not have any complications until 7/2019 when she had gall stone pancreatitis and now she has cholecystitis. Labs are consistent with hemolysis and peripheral smear shows polychromasia and micro spherocytes although the quality of slides was not good. She reported that she had URI symptoms but most likely this cholecystitis was a trigger of her hemolysis.     -Continue supportive measure; IVF, folic acid, transfusion PRN  -Check CBC and hemolysis labs (LDH, reticulocyte, uric acid, P) daily  -No additional work-ups at this moment  -She will need elective splenectomy once infection resolves (at this moment, not during this admission)  -Need close follow-up with hematologist after discharge    # Cholecystitis  She had pancreatitis recently and now has cholecystitis. This is most likely related to her hereditary spherocytosis and hemolysis.   -Abx per primary  -Cholecystectomy?    The case was discussed with Dr. Mike Rachel MD, MPH  Hematology/Oncology Fellow  Pager: [phone]

## 2019-09-06 NOTE — CONSULT NOTE ADULT - ATTENDING COMMENTS
Ms. Bal was seen during hematology consultation on study. She is known patient with hereditary spherocytosis who was admitted recently with pancreatitis from a gallstone. She is now admitted with acute cholecystitis with a markedly elevated white blood cell count. She has pain in the right upper quadrant at this time and has fevers. She is on antibiotics. She is not followed by a hematologist. Her primary care doctor prescribes her folate acid for her.    On physical examination, she is lying still because of pain. The lungs are clear. The eyes or jaundice. Left side the abdomen is nontender. The extremities are normal.    The peripheral blood smear was reviewed, and this is consistent with hereditary spherocytosis with marked numbers of spherocytes and significant polychromasia. Her reticulocyte count is robust. She should continue on folic acid supplementation. I agree with the assessment and plan as stated in the note above from Dr. Rachel

## 2019-09-06 NOTE — PROGRESS NOTE ADULT - PROBLEM SELECTOR PLAN 4
Likely 2/2 hemolysis given history of HS and GB disease  -Total bili elevated to 23.5. Direct bili 1.6 on admission  -Will trend  -c/w GB workup (see above) -Likely 2/2 hemolysis given history of HS and GB disease  -Total bilirubin downtrending. T bili however uptrending concerning for CBD obstruction. May require percutaneous cholecystostomy tube for decompression/source control   -c/w GB workup (see above)

## 2019-09-06 NOTE — PROGRESS NOTE ADULT - ATTENDING COMMENTS
follow up cultures - continue with zosyn.  T bili decreasing but DBili increasing, pt with persistent RUQ pain - still feels nauseous but no vomiting - continue with NPO - would follow up MRCP, discuss with GI.  John pisano pending.  will repeat bmp.

## 2019-09-07 DIAGNOSIS — R19.7 DIARRHEA, UNSPECIFIED: ICD-10-CM

## 2019-09-07 DIAGNOSIS — D64.9 ANEMIA, UNSPECIFIED: ICD-10-CM

## 2019-09-07 LAB
ALBUMIN SERPL ELPH-MCNC: 3.4 G/DL — SIGNIFICANT CHANGE UP (ref 3.3–5)
ALP SERPL-CCNC: 73 U/L — SIGNIFICANT CHANGE UP (ref 40–120)
ALT FLD-CCNC: 29 U/L — SIGNIFICANT CHANGE UP (ref 4–33)
ANION GAP SERPL CALC-SCNC: 11 MMO/L — SIGNIFICANT CHANGE UP (ref 7–14)
APPEARANCE UR: SIGNIFICANT CHANGE UP
APTT BLD: 29.9 SEC — SIGNIFICANT CHANGE UP (ref 27.5–36.3)
AST SERPL-CCNC: 22 U/L — SIGNIFICANT CHANGE UP (ref 4–32)
BACTERIA # UR AUTO: NEGATIVE — SIGNIFICANT CHANGE UP
BILIRUB DIRECT SERPL-MCNC: 12.3 MG/DL — HIGH (ref 0.1–0.2)
BILIRUB SERPL-MCNC: 31.2 MG/DL — HIGH (ref 0.2–1.2)
BILIRUB UR-MCNC: HIGH
BLOOD UR QL VISUAL: SIGNIFICANT CHANGE UP
BUN SERPL-MCNC: 8 MG/DL — SIGNIFICANT CHANGE UP (ref 7–23)
CALCIUM SERPL-MCNC: 9.2 MG/DL — SIGNIFICANT CHANGE UP (ref 8.4–10.5)
CHLORIDE SERPL-SCNC: 103 MMOL/L — SIGNIFICANT CHANGE UP (ref 98–107)
CO2 SERPL-SCNC: 23 MMOL/L — SIGNIFICANT CHANGE UP (ref 22–31)
COLOR SPEC: SIGNIFICANT CHANGE UP
CREAT SERPL-MCNC: 0.4 MG/DL — LOW (ref 0.5–1.3)
GLUCOSE SERPL-MCNC: 146 MG/DL — HIGH (ref 70–99)
GLUCOSE UR-MCNC: NEGATIVE — SIGNIFICANT CHANGE UP
GRAM STN WND: SIGNIFICANT CHANGE UP
HCT VFR BLD CALC: 20.1 % — CRITICAL LOW (ref 34.5–45)
HCT VFR BLD CALC: 20.3 % — CRITICAL LOW (ref 34.5–45)
HCT VFR BLD CALC: 21.9 % — LOW (ref 34.5–45)
HGB BLD-MCNC: 6.4 G/DL — CRITICAL LOW (ref 11.5–15.5)
HGB BLD-MCNC: 6.7 G/DL — CRITICAL LOW (ref 11.5–15.5)
HGB BLD-MCNC: 7.2 G/DL — LOW (ref 11.5–15.5)
HYALINE CASTS # UR AUTO: NEGATIVE — SIGNIFICANT CHANGE UP
INR BLD: 1.8 — HIGH (ref 0.88–1.17)
KETONES UR-MCNC: SIGNIFICANT CHANGE UP
LEUKOCYTE ESTERASE UR-ACNC: SIGNIFICANT CHANGE UP
MAGNESIUM SERPL-MCNC: 1.9 MG/DL — SIGNIFICANT CHANGE UP (ref 1.6–2.6)
MCHC RBC-ENTMCNC: 26.6 PG — LOW (ref 27–34)
MCHC RBC-ENTMCNC: 27 PG — SIGNIFICANT CHANGE UP (ref 27–34)
MCHC RBC-ENTMCNC: 27.3 PG — SIGNIFICANT CHANGE UP (ref 27–34)
MCHC RBC-ENTMCNC: 31.8 % — LOW (ref 32–36)
MCHC RBC-ENTMCNC: 32.9 % — SIGNIFICANT CHANGE UP (ref 32–36)
MCHC RBC-ENTMCNC: 33 % — SIGNIFICANT CHANGE UP (ref 32–36)
MCV RBC AUTO: 81.9 FL — SIGNIFICANT CHANGE UP (ref 80–100)
MCV RBC AUTO: 83 FL — SIGNIFICANT CHANGE UP (ref 80–100)
MCV RBC AUTO: 83.4 FL — SIGNIFICANT CHANGE UP (ref 80–100)
NITRITE UR-MCNC: NEGATIVE — SIGNIFICANT CHANGE UP
NRBC # FLD: 0.04 K/UL — SIGNIFICANT CHANGE UP (ref 0–0)
NRBC # FLD: 0.05 K/UL — SIGNIFICANT CHANGE UP (ref 0–0)
NRBC # FLD: 0.08 K/UL — SIGNIFICANT CHANGE UP (ref 0–0)
PH UR: 6.5 — SIGNIFICANT CHANGE UP (ref 5–8)
PHOSPHATE SERPL-MCNC: 1.9 MG/DL — LOW (ref 2.5–4.5)
PLATELET # BLD AUTO: 165 K/UL — SIGNIFICANT CHANGE UP (ref 150–400)
PLATELET # BLD AUTO: 178 K/UL — SIGNIFICANT CHANGE UP (ref 150–400)
PLATELET # BLD AUTO: 207 K/UL — SIGNIFICANT CHANGE UP (ref 150–400)
PMV BLD: 10.3 FL — SIGNIFICANT CHANGE UP (ref 7–13)
PMV BLD: 10.3 FL — SIGNIFICANT CHANGE UP (ref 7–13)
PMV BLD: 10.5 FL — SIGNIFICANT CHANGE UP (ref 7–13)
POTASSIUM SERPL-MCNC: 3.9 MMOL/L — SIGNIFICANT CHANGE UP (ref 3.5–5.3)
POTASSIUM SERPL-SCNC: 3.9 MMOL/L — SIGNIFICANT CHANGE UP (ref 3.5–5.3)
PROT SERPL-MCNC: 6 G/DL — SIGNIFICANT CHANGE UP (ref 6–8.3)
PROT UR-MCNC: 30 — SIGNIFICANT CHANGE UP
PROTHROM AB SERPL-ACNC: 20.4 SEC — HIGH (ref 9.8–13.1)
RBC # BLD: 2.41 M/UL — LOW (ref 3.8–5.2)
RBC # BLD: 2.48 M/UL — LOW (ref 3.8–5.2)
RBC # BLD: 2.64 M/UL — LOW (ref 3.8–5.2)
RBC # FLD: 19.9 % — HIGH (ref 10.3–14.5)
RBC # FLD: 22.1 % — HIGH (ref 10.3–14.5)
RBC # FLD: 25.3 % — HIGH (ref 10.3–14.5)
RBC CASTS # UR COMP ASSIST: SIGNIFICANT CHANGE UP (ref 0–?)
SODIUM SERPL-SCNC: 137 MMOL/L — SIGNIFICANT CHANGE UP (ref 135–145)
SP GR SPEC: 1.03 — SIGNIFICANT CHANGE UP (ref 1–1.04)
SPECIMEN SOURCE: SIGNIFICANT CHANGE UP
SQUAMOUS # UR AUTO: SIGNIFICANT CHANGE UP
UROBILINOGEN FLD QL: HIGH
WBC # BLD: 11.06 K/UL — HIGH (ref 3.8–10.5)
WBC # BLD: 13.27 K/UL — HIGH (ref 3.8–10.5)
WBC # BLD: 22.03 K/UL — HIGH (ref 3.8–10.5)
WBC # FLD AUTO: 11.06 K/UL — HIGH (ref 3.8–10.5)
WBC # FLD AUTO: 13.27 K/UL — HIGH (ref 3.8–10.5)
WBC # FLD AUTO: 22.03 K/UL — HIGH (ref 3.8–10.5)
WBC UR QL: SIGNIFICANT CHANGE UP (ref 0–?)

## 2019-09-07 PROCEDURE — 99233 SBSQ HOSP IP/OBS HIGH 50: CPT

## 2019-09-07 PROCEDURE — 93010 ELECTROCARDIOGRAM REPORT: CPT

## 2019-09-07 RX ORDER — POTASSIUM PHOSPHATE, MONOBASIC POTASSIUM PHOSPHATE, DIBASIC 236; 224 MG/ML; MG/ML
15 INJECTION, SOLUTION INTRAVENOUS ONCE
Refills: 0 | Status: COMPLETED | OUTPATIENT
Start: 2019-09-07 | End: 2019-09-07

## 2019-09-07 RX ORDER — POTASSIUM CHLORIDE 20 MEQ
20 PACKET (EA) ORAL
Refills: 0 | Status: COMPLETED | OUTPATIENT
Start: 2019-09-07 | End: 2019-09-07

## 2019-09-07 RX ORDER — POTASSIUM CHLORIDE 20 MEQ
10 PACKET (EA) ORAL
Refills: 0 | Status: COMPLETED | OUTPATIENT
Start: 2019-09-07 | End: 2019-09-07

## 2019-09-07 RX ORDER — FOLIC ACID 0.8 MG
1 TABLET ORAL DAILY
Refills: 0 | Status: DISCONTINUED | OUTPATIENT
Start: 2019-09-07 | End: 2019-09-12

## 2019-09-07 RX ADMIN — HYDROMORPHONE HYDROCHLORIDE 1 MILLIGRAM(S): 2 INJECTION INTRAMUSCULAR; INTRAVENOUS; SUBCUTANEOUS at 15:27

## 2019-09-07 RX ADMIN — HYDROMORPHONE HYDROCHLORIDE 1 MILLIGRAM(S): 2 INJECTION INTRAMUSCULAR; INTRAVENOUS; SUBCUTANEOUS at 21:15

## 2019-09-07 RX ADMIN — HYDROMORPHONE HYDROCHLORIDE 1 MILLIGRAM(S): 2 INJECTION INTRAMUSCULAR; INTRAVENOUS; SUBCUTANEOUS at 20:57

## 2019-09-07 RX ADMIN — HYDROMORPHONE HYDROCHLORIDE 1 MILLIGRAM(S): 2 INJECTION INTRAMUSCULAR; INTRAVENOUS; SUBCUTANEOUS at 15:43

## 2019-09-07 RX ADMIN — Medication 1000 MILLIGRAM(S): at 00:00

## 2019-09-07 RX ADMIN — SODIUM CHLORIDE 100 MILLILITER(S): 9 INJECTION INTRAMUSCULAR; INTRAVENOUS; SUBCUTANEOUS at 11:47

## 2019-09-07 RX ADMIN — Medication 100 MILLIEQUIVALENT(S): at 03:55

## 2019-09-07 RX ADMIN — SODIUM CHLORIDE 100 MILLILITER(S): 9 INJECTION INTRAMUSCULAR; INTRAVENOUS; SUBCUTANEOUS at 06:25

## 2019-09-07 RX ADMIN — ONDANSETRON 4 MILLIGRAM(S): 8 TABLET, FILM COATED ORAL at 20:52

## 2019-09-07 RX ADMIN — PIPERACILLIN AND TAZOBACTAM 25 GRAM(S): 4; .5 INJECTION, POWDER, LYOPHILIZED, FOR SOLUTION INTRAVENOUS at 07:00

## 2019-09-07 RX ADMIN — Medication 1000 MILLIGRAM(S): at 06:40

## 2019-09-07 RX ADMIN — Medication 100 MILLIEQUIVALENT(S): at 00:41

## 2019-09-07 RX ADMIN — PIPERACILLIN AND TAZOBACTAM 25 GRAM(S): 4; .5 INJECTION, POWDER, LYOPHILIZED, FOR SOLUTION INTRAVENOUS at 22:19

## 2019-09-07 RX ADMIN — PANTOPRAZOLE SODIUM 40 MILLIGRAM(S): 20 TABLET, DELAYED RELEASE ORAL at 11:43

## 2019-09-07 RX ADMIN — POTASSIUM PHOSPHATE, MONOBASIC POTASSIUM PHOSPHATE, DIBASIC 62.5 MILLIMOLE(S): 236; 224 INJECTION, SOLUTION INTRAVENOUS at 11:39

## 2019-09-07 RX ADMIN — Medication 1 MILLIGRAM(S): at 11:47

## 2019-09-07 RX ADMIN — Medication 100 MILLIEQUIVALENT(S): at 01:59

## 2019-09-07 RX ADMIN — Medication 400 MILLIGRAM(S): at 06:24

## 2019-09-07 RX ADMIN — PIPERACILLIN AND TAZOBACTAM 25 GRAM(S): 4; .5 INJECTION, POWDER, LYOPHILIZED, FOR SOLUTION INTRAVENOUS at 15:27

## 2019-09-07 RX ADMIN — Medication 650 MILLIGRAM(S): at 10:20

## 2019-09-07 RX ADMIN — Medication 650 MILLIGRAM(S): at 11:00

## 2019-09-07 NOTE — PROGRESS NOTE ADULT - ASSESSMENT
40 yo female with acute cholecystitis complicated by hyperbilirubinemia (T. bili 23.5, direct 1.6) likely secondary to hemolysis from hereditary spherocytosis     - s/p perc quincy placement  -will discuss surgery plan for interval lap quincy  - continue antibiotic (IV zosyn)  - care per primary team     Please page for any further questions     B Team surgery [id number]

## 2019-09-07 NOTE — PROGRESS NOTE ADULT - PROBLEM SELECTOR PLAN 7
DVT Prophylaxis  -IMPROVE Score 0  -NPO for now, continue gentle hydration. Will advance diet as tolerated DVT Prophylaxis  -IMPROVE Score 0

## 2019-09-07 NOTE — PROGRESS NOTE ADULT - PROBLEM SELECTOR PLAN 1
-Recent admission 07/2019 for acute cholecystitis and gallstone pancreatitis  Underwent ERCP w/  sphincterotomy and stent placement, w/ balloon sweep and removal of multiple dark stones.  Cholangiogram sig for mild CHD stricture but no fistula. 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  -AB U/S on this admission sig for acute cholecystitis   -MRCP 9/6 sig for acute cholecystitis w/ Mirrizzi syndrome. And possible pancreatitis  -S/p IR Perc quincy tube placement 9/6 w/ 100cc purulent fluid drained  -Gram stain from drainage sig for gram positive cocci in pairs  -can c/w zosyn for now (09/05-  -F/u sensitivities  -c/w tylenol PRN  -c/w dilaudid 1mg q4 PRN  -c/w gentle hydration  -F/u GI recs  -F/u surgery recs  -NPO for now, will advance diet as tolerated -Recent admission 07/2019 for acute cholecystitis and gallstone pancreatitis  Underwent ERCP w/  sphincterotomy and stent placement, w/ balloon sweep and removal of multiple dark stones.  Cholangiogram sig for mild CHD stricture but no fistula. 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  -AB U/S on this admission sig for acute cholecystitis   -MRCP 9/6 sig for acute cholecystitis w/ Mirrizzi syndrome. And possible pancreatitis  -S/p IR Perc quincy tube placement 9/6 w/ 100cc purulent fluid drained  -Gram stain from drainage sig for gram positive cocci in pairs  -can c/w zosyn for now (09/05-  -F/u sensitivities  -c/w tylenol PRN  -c/w dilaudid 1mg q4 PRN  -c/w gentle hydration  -F/u GI recs  -F/u surgery recs, no surgery for now pending, thinks elevated the direct bilirubin due to increase in overall bilirubin and more conversion  -advancing diet as tolerates

## 2019-09-07 NOTE — PROGRESS NOTE ADULT - ATTENDING COMMENTS
pt with anemia likely due to infectious etiology causing stress leading to hemolysis from hereditary spherocytosis.  continue with antibiotics and follow up cultures from perc quincy.  monitor lfts.

## 2019-09-07 NOTE — PROGRESS NOTE ADULT - PROBLEM SELECTOR PLAN 6
DVT Prophylaxis  -IMPROVE Score 0  -NPO for now, continue gentle hydration. Will advance diet as tolerated -Likely 2/2 hemolysis given history of HS and GB disease and triggered from recent per quincy placement  -Total bilirubin uptrending along w/ direct T bili  -s/p perc quincy tube placement 09/7 still draining and f/u I/Os and whether IR needs to do a tube check  -Will need elective splenectomy per Heme  -c/w GB workup (see above)

## 2019-09-07 NOTE — PROGRESS NOTE ADULT - PROBLEM SELECTOR PLAN 3
-Normocytic anemia  -Hgb 9.1 on admission now downtrending, likely secondary to hemolysis .   -Haptoglobin 31, reticulocytes inc  -Hematology consulted; recs elective splenectomy  -c/w home folic acid  -Maintain active type and screen  -Transfuse Hgb < 7 -Normocytic anemia  -Hgb 9.1 on admission now downtrending, likely secondary to hemolysis .   -Hgb 6.4 today, will transfuse 1 unit  -F/u post transfusion CBC  -Hematology consulted; recs elective splenectomy  -c/w home folic acid  -Maintain active type and screen  -Transfuse Hgb < 7 -Normocytic anemia  -Hgb 9.1 on admission now downtrending, likely secondary to hemolysis .   -Hgb 6.4 today, will transfuse 1 unit  -F/u post transfusion CBC  -Hematology consulted; recs elective splenectomy following resolution of active infection  -c/w home folic acid  -Maintain active type and screen  -Transfuse Hgb < 7 -Normocytic anemia  -Hgb 9.1 on admission now downtrending, likely secondary to hemolysis .   -Hgb 6.4 today, will transfuse 2 units  -F/u post transfusion CBC  -Hematology consulted; recs elective splenectomy following resolution of active infection  -c/w home folic acid  -Maintain active type and screen  -Transfuse Hgb < 7 Having now 2-3 episodes of nonbloody but painful diarrhea  -f/u C. diff, GI PCR and stool cultures and Ova and Parasites  -c/w Zosyn for now  -unclear if possible MAHA, HUS, TTP but not thrombocytopenic and coags aren't too abnormal yet and no signs of confusion  -c/w stool counts and monitoring for any differences  -if worsening may need CTAP and possible heme recs

## 2019-09-07 NOTE — PROGRESS NOTE ADULT - PROBLEM SELECTOR PLAN 2
-Pt met SIRS criteria on admission w/ tachycardia to 106 and leukocytosis to 23.28. Likely 2/2 acute cholecystitis/ pancreatitis  -Leukocytosis up trending  -Pt persistently tachycardic, though sinus  -c/w zosyn 09/05-  -Blood cultures NGTD   -Will send UA/Urine culture  -c/w gentle hydration  -Trend CBCs -Pt met SIRS criteria on admission w/ tachycardia to 106 and leukocytosis to 23.28. Likely 2/2 acute cholecystitis/ pancreatitis  -Leukocytosis downtrended  -Pt persistently tachycardic, though EKG showing sinus tachycardia  -c/w zosyn 09/05-  -Blood cultures NGTD   -Will send UA/Urine culture  -c/w gentle hydration  -Trend CBCs

## 2019-09-07 NOTE — PROGRESS NOTE ADULT - ASSESSMENT
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 S/p IR Perc quincy tube placement 9/6

## 2019-09-07 NOTE — PROGRESS NOTE ADULT - PROBLEM SELECTOR PLAN 5
DVT Prophylaxis  -IMPROVE Score 0  -NPO for now, continue gentle hydration. Will advance diet as tolerated -Likely 2/2 hemolysis given history of HS and GB disease  -Total bilirubin downtrending. T bili however up trending  -s/p perc quincy tube placement 09/7 (see above)  -Will need elective splenectomy per Heme  -c/w GB workup (see above) -Normocytic anemia  -Hgb 9.1 on admission now downtrending, likely secondary to hemolysis .   -Hgb 6.4 today, will transfuse 2 units  -F/u post transfusion CBC  -Hematology consulted; recs elective splenectomy following resolution of active infection  -c/w home folic acid  -Maintain active type and screen  -Transfuse Hgb < 7

## 2019-09-07 NOTE — PROGRESS NOTE ADULT - PROBLEM SELECTOR PLAN 4
-Likely 2/2 hemolysis given history of HS and GB disease  -Total bilirubin downtrending. T bili however up trending  -s/p perc quincy tube placement 09/7 (see above)  -Will need elective splenectomy per Heme  -c/w GB workup (see above) -Normocytic anemia  -Hgb 9.1 on admission now downtrending, likely secondary to hemolysis .   -Hgb 6.4 today, will transfuse 2 units  -F/u post transfusion CBC  -Hematology consulted; recs elective splenectomy following resolution of active infection  -c/w home folic acid  -Maintain active type and screen  -Transfuse Hgb < 7 Most likely hemolyzing from HS and increased stress from recent per quincy placement  -f/u post-transfusion CBC and monitor  -keep Active T&S  -transfuse >7 Hgb  -trend LDH, bilirubin (direct and indirect), haptoglobin daily for now  -f/u heme recs Most likely hemolyzing from HS and increased stress from recent per quincy placement  -f/u post-transfusion CBC and monitor  -keep Active T&S  -transfuse >7 Hgb  -trend LDH, bilirubin (direct and indirect), haptoglobin daily for now  -f/u heme recs  -may need CTAP angio if still persistent and for any GIB

## 2019-09-07 NOTE — PROGRESS NOTE ADULT - SUBJECTIVE AND OBJECTIVE BOX
Surgery Progress Note    S: Patient seen and examined. S/p perc quincy tube but IR yesterday. Pain well controlled. Denies nausea or vomiting.     O:  Vital Signs Last 24 Hrs  T(C): 37.4 (07 Sep 2019 06:23), Max: 38.1 (06 Sep 2019 13:43)  T(F): 99.3 (07 Sep 2019 06:23), Max: 100.5 (06 Sep 2019 13:43)  HR: 118 (07 Sep 2019 06:23) (118 - 130)  BP: 107/55 (07 Sep 2019 06:23) (105/62 - 118/67)  BP(mean): --  RR: 18 (07 Sep 2019 06:23) (17 - 19)  SpO2: 96% (07 Sep 2019 06:23) (95% - 100%)    I&O's Detail    06 Sep 2019 07:01  -  07 Sep 2019 07:00  --------------------------------------------------------  IN:    IV PiggyBack: 200 mL    Sodium Chloride 0.9% IV Bolus: 1000 mL    sodium chloride 0.9%.: 300 mL  Total IN: 1500 mL    OUT:    Drain: 200 mL  Total OUT: 200 mL    Total NET: 1300 mL          MEDICATIONS  (STANDING):  folic acid 1 milliGRAM(s) Oral daily  pantoprazole  Injectable 40 milliGRAM(s) IV Push daily  piperacillin/tazobactam IVPB.. 3.375 Gram(s) IV Intermittent every 8 hours  potassium phosphate IVPB 15 milliMole(s) IV Intermittent once  sodium chloride 0.9%. 1000 milliLiter(s) (100 mL/Hr) IV Continuous <Continuous>    MEDICATIONS  (PRN):  acetaminophen   Tablet .. 650 milliGRAM(s) Oral every 6 hours PRN Temp greater or equal to 38C (100.4F), Mild Pain (1 - 3)  HYDROmorphone  Injectable 1 milliGRAM(s) IV Push every 4 hours PRN Moderate Pain (4 - 6), Severe Pain (7-10)  ondansetron Injectable 4 milliGRAM(s) IV Push every 8 hours PRN Nausea and/or Vomiting                            6.4    22.03 )-----------( 207      ( 07 Sep 2019 06:00 )             20.1       09-07    137  |  103  |  8   ----------------------------<  146<H>  3.9   |  23  |  0.40<L>    Ca    9.2      07 Sep 2019 06:00  Phos  1.9     09-07  Mg     1.9     09-07    TPro  6.0  /  Alb  3.4  /  TBili  31.2<H>  /  DBili  x   /  AST  22  /  ALT  29  /  AlkPhos  73  09-07      Physical Exam:  Gen: Laying in bed, NAD  Resp: Unlabored breathing  Abd: soft, mild TTP in RUQ and LUQ, perc quincy tube with bilious output  Ext: WWP  Skin: No rashes

## 2019-09-07 NOTE — PROGRESS NOTE ADULT - SUBJECTIVE AND OBJECTIVE BOX
Skye Harrington MD  PGY 1 Department of Internal Medicine  Pager: [phone]    39y old  Female who presents with a chief complaint of Cholecystitis      SUBJECTIVE / OVERNIGHT EVENTS: Pt seen and examined. s/p perc quincy tube placement yesterday by KRISTINA Radford to 130s overnight. Sinus on EKG. Given IV tylenol, 1L NS w/o resolution of tachycardia.    [  ]Fever [  ]Chills  [  ]Nausea  [  ]Vomiting  [  ]Chest Pain [  ]SOB  [  ]Palpitations  [  ]Abdominal Pain  [  ]Numbness/tingling  [   ]Diarrhea   [   ]Constipation   [  ]New joint aches     MEDICATIONS  (STANDING):  acetaminophen  IVPB .. 1000 milliGRAM(s) IV Intermittent once  pantoprazole  Injectable 40 milliGRAM(s) IV Push daily  piperacillin/tazobactam IVPB.. 3.375 Gram(s) IV Intermittent every 8 hours  sodium chloride 0.9%. 1000 milliLiter(s) (100 mL/Hr) IV Continuous <Continuous>    MEDICATIONS  (PRN):  acetaminophen   Tablet .. 650 milliGRAM(s) Oral every 6 hours PRN Temp greater or equal to 38C (100.4F), Mild Pain (1 - 3)  HYDROmorphone  Injectable 1 milliGRAM(s) IV Push every 4 hours PRN Moderate Pain (4 - 6), Severe Pain (7-10)  ondansetron Injectable 4 milliGRAM(s) IV Push every 8 hours PRN Nausea and/or Vomiting      I&O's Summary    05 Sep 2019 07:01  -  06 Sep 2019 07:00  --------------------------------------------------------  IN: 450 mL / OUT: 0 mL / NET: 450 mL    06 Sep 2019 07:01  -  07 Sep 2019 06:19  --------------------------------------------------------  IN: 1500 mL / OUT: 100 mL / NET: 1400 mL        Vital Signs Last 24 Hrs  T(C): 37.8 (07 Sep 2019 00:23), Max: 38.1 (06 Sep 2019 13:43)  T(F): 100 (07 Sep 2019 00:23), Max: 100.5 (06 Sep 2019 13:43)  HR: 130 (07 Sep 2019 00:23) (120 - 130)  BP: 107/50 (07 Sep 2019 00:23) (105/62 - 118/67)  BP(mean): --  RR: 18 (07 Sep 2019 00:23) (17 - 19)  SpO2: 95% (07 Sep 2019 00:23) (95% - 100%)    CAPILLARY BLOOD GLUCOSE                      PHYSICAL EXAM:              GENERAL APPEARANCE: NAD  	HEENT:  PERRL, EOMI. + scleral icterus  	NECK: Neck supple, non-tender without lymphadenopathy  	CARDIAC: RRR, S1S2 normal, no murmurs, rubs or gallops  	LUNGS: CTAB, no wheezing, no rales  	ABDOMEN: TTP RUQ, + Bowel sounds, Negative Wheeler's sign, No guarding or rebound. No peritoneal signs  	MUSCULOSKELETAL: ROM intact  	EXTREMITIES: No significant deformity or joint abnormality. No edema. Peripheral pulses intact.   	NEUROLOGICAL: Strength and sensation symmetric and intact throughout.   	SKIN: Warm and dry.              PSYCHIATRIC: AOx3. Normal affect and behaviour       LABS:                        7.1    27.69 )-----------( 211      ( 06 Sep 2019 16:40 )             22.2     Auto Eosinophil # x     / Auto Eosinophil % x     / Auto Neutrophil # x     / Auto Neutrophil % x     / BANDS % x                            7.1    24.00 )-----------( 215      ( 06 Sep 2019 07:35 )             22.6     Auto Eosinophil # 0.02  / Auto Eosinophil % 0.1   / Auto Neutrophil # 22.20 / Auto Neutrophil % 92.5  / BANDS % x        09-06    137  |  102  |  7   ----------------------------<  348<H>  2.9<LL>   |  20<L>  |  0.37<L>    Ca    7.1<L>      06 Sep 2019 07:35  Mg     1.3     09-06  Phos  1.9     09-06  TPro  5.4<L>  /  Alb  3.2<L>  /  TBili  19.6<H>  /  DBili  5.0<H>  /  AST  16  /  ALT  15  /  AlkPhos  56  09-06    PT/INR - ( 06 Sep 2019 07:35 )   PT: 26.3 SEC;   INR: 2.25          PTT - ( 06 Sep 2019 07:35 )  PTT:30.0 SEC              RADIOLOGY & ADDITIONAL TESTS:      MR MRCP No CON (09/06/2019):    FINDINGS:    LOWER CHEST: Trace bilateral pleural effusions.    LIVER: Within normal limits.   BILE DUCTS: Redemonstrated compression of the common hepatic duct. Common   bile duct stent is present with resolved intrahepatic biliary ductal   dilatation.  GALLBLADDER: Cholelithiasis with large stone at the gallbladder neck,   measuring 1.5 cm. Layering biliary sludge. Gallbladder is distended with   mural thickening and edema.  SPLEEN: Enlarged, measuring up to 16.7 cm.  PANCREAS: Edema within the body of the pancreas.  ADRENALS: Within normal limits.  KIDNEYS/URETERS: Within normal limits.      IMPRESSION:     Acute cholecystitis with Mirizzi syndrome. Interval placement of common   bile duct stent with resolved biliary ductal dilatation.    Probable pancreatitis involving the body of the pancreas.    Splenomegaly.    Imaging Personally Reviewed:    Consultant(s) Notes Reviewed:      Care Discussed with Consultants/Other Providers: Skye Harrington MD  PGY 1 Department of Internal Medicine  Pager: [phone]    39y old  Female who presents with a chief complaint of Cholecystitis      SUBJECTIVE / OVERNIGHT EVENTS: Pt seen and examined. s/p perc quincy tube placement yesterday by IR. Tachy to 130s overnight. Sinus on EKG. Given IV tylenol, 1L NS w/o resolution of tachycardia. This AM c/o pain/discomfort/fatigue. Hgb 6.4 today. Will transfuse 1 u        MEDICATIONS  (STANDING):  acetaminophen  IVPB .. 1000 milliGRAM(s) IV Intermittent once  pantoprazole  Injectable 40 milliGRAM(s) IV Push daily  piperacillin/tazobactam IVPB.. 3.375 Gram(s) IV Intermittent every 8 hours  sodium chloride 0.9%. 1000 milliLiter(s) (100 mL/Hr) IV Continuous <Continuous>    MEDICATIONS  (PRN):  acetaminophen   Tablet .. 650 milliGRAM(s) Oral every 6 hours PRN Temp greater or equal to 38C (100.4F), Mild Pain (1 - 3)  HYDROmorphone  Injectable 1 milliGRAM(s) IV Push every 4 hours PRN Moderate Pain (4 - 6), Severe Pain (7-10)  ondansetron Injectable 4 milliGRAM(s) IV Push every 8 hours PRN Nausea and/or Vomiting      I&O's Summary    05 Sep 2019 07:01  -  06 Sep 2019 07:00  --------------------------------------------------------  IN: 450 mL / OUT: 0 mL / NET: 450 mL    06 Sep 2019 07:01  -  07 Sep 2019 06:19  --------------------------------------------------------  IN: 1500 mL / OUT: 100 mL / NET: 1400 mL        Vital Signs Last 24 Hrs  T(C): 37.8 (07 Sep 2019 00:23), Max: 38.1 (06 Sep 2019 13:43)  T(F): 100 (07 Sep 2019 00:23), Max: 100.5 (06 Sep 2019 13:43)  HR: 130 (07 Sep 2019 00:23) (120 - 130)  BP: 107/50 (07 Sep 2019 00:23) (105/62 - 118/67)  BP(mean): --  RR: 18 (07 Sep 2019 00:23) (17 - 19)  SpO2: 95% (07 Sep 2019 00:23) (95% - 100%)    CAPILLARY BLOOD GLUCOSE                      PHYSICAL EXAM:              GENERAL APPEARANCE: NAD, lethargic, minimally responsive  	HEENT:  PERRL, EOMI. + scleral icterus  	NECK: Neck supple, non-tender without lymphadenopathy  	CARDIAC: RRR, S1S2 normal, no murmurs, rubs or gallops  	LUNGS: CTAB, no wheezing, no rales  	ABDOMEN: severe TTP RUQ,  + guarding. No rebound. + Bowel sounds  	MUSCULOSKELETAL: ROM intact  	EXTREMITIES: No significant deformity or joint abnormality. No edema. Peripheral pulses intact.   	NEUROLOGICAL: Strength and sensation symmetric and intact throughout.   	SKIN: Warm and dry.              PSYCHIATRIC: AOx3. Normal affect and behaviour       LABS:                        7.1    27.69 )-----------( 211      ( 06 Sep 2019 16:40 )             22.2     Auto Eosinophil # x     / Auto Eosinophil % x     / Auto Neutrophil # x     / Auto Neutrophil % x     / BANDS % x                            7.1    24.00 )-----------( 215      ( 06 Sep 2019 07:35 )             22.6     Auto Eosinophil # 0.02  / Auto Eosinophil % 0.1   / Auto Neutrophil # 22.20 / Auto Neutrophil % 92.5  / BANDS % x        09-06    137  |  102  |  7   ----------------------------<  348<H>  2.9<LL>   |  20<L>  |  0.37<L>    Ca    7.1<L>      06 Sep 2019 07:35  Mg     1.3     09-06  Phos  1.9     09-06  TPro  5.4<L>  /  Alb  3.2<L>  /  TBili  19.6<H>  /  DBili  5.0<H>  /  AST  16  /  ALT  15  /  AlkPhos  56  09-06    PT/INR - ( 06 Sep 2019 07:35 )   PT: 26.3 SEC;   INR: 2.25          PTT - ( 06 Sep 2019 07:35 )  PTT:30.0 SEC              RADIOLOGY & ADDITIONAL TESTS:      MR MRCP No CON (09/06/2019):    FINDINGS:    LOWER CHEST: Trace bilateral pleural effusions.    LIVER: Within normal limits.   BILE DUCTS: Redemonstrated compression of the common hepatic duct. Common   bile duct stent is present with resolved intrahepatic biliary ductal   dilatation.  GALLBLADDER: Cholelithiasis with large stone at the gallbladder neck,   measuring 1.5 cm. Layering biliary sludge. Gallbladder is distended with   mural thickening and edema.  SPLEEN: Enlarged, measuring up to 16.7 cm.  PANCREAS: Edema within the body of the pancreas.  ADRENALS: Within normal limits.  KIDNEYS/URETERS: Within normal limits.      IMPRESSION:     Acute cholecystitis with Mirizzi syndrome. Interval placement of common   bile duct stent with resolved biliary ductal dilatation.    Probable pancreatitis involving the body of the pancreas.    Splenomegaly.    Imaging Personally Reviewed:    Consultant(s) Notes Reviewed:      Care Discussed with Consultants/Other Providers: Skye Harrington MD  PGY 1 Department of Internal Medicine  Pager: [phone]    39y old  Female who presents with a chief complaint of Cholecystitis      SUBJECTIVE / OVERNIGHT EVENTS: Pt seen and examined. s/p perc quincy tube placement yesterday by IR. Tachy to 130s overnight. Sinus on EKG. Given IV tylenol, 1L NS w/o resolution of tachycardia. This AM c/o pain/discomfort/fatigue. Hgb 6.4 today. Will transfuse 1 u        MEDICATIONS  (STANDING):  acetaminophen  IVPB .. 1000 milliGRAM(s) IV Intermittent once  pantoprazole  Injectable 40 milliGRAM(s) IV Push daily  piperacillin/tazobactam IVPB.. 3.375 Gram(s) IV Intermittent every 8 hours  sodium chloride 0.9%. 1000 milliLiter(s) (100 mL/Hr) IV Continuous <Continuous>    MEDICATIONS  (PRN):  acetaminophen   Tablet .. 650 milliGRAM(s) Oral every 6 hours PRN Temp greater or equal to 38C (100.4F), Mild Pain (1 - 3)  HYDROmorphone  Injectable 1 milliGRAM(s) IV Push every 4 hours PRN Moderate Pain (4 - 6), Severe Pain (7-10)  ondansetron Injectable 4 milliGRAM(s) IV Push every 8 hours PRN Nausea and/or Vomiting      I&O's Summary    05 Sep 2019 07:01  -  06 Sep 2019 07:00  --------------------------------------------------------  IN: 450 mL / OUT: 0 mL / NET: 450 mL    06 Sep 2019 07:01  -  07 Sep 2019 06:19  --------------------------------------------------------  IN: 1500 mL / OUT: 100 mL / NET: 1400 mL        Vital Signs Last 24 Hrs  T(C): 37.8 (07 Sep 2019 00:23), Max: 38.1 (06 Sep 2019 13:43)  T(F): 100 (07 Sep 2019 00:23), Max: 100.5 (06 Sep 2019 13:43)  HR: 130 (07 Sep 2019 00:23) (120 - 130)  BP: 107/50 (07 Sep 2019 00:23) (105/62 - 118/67)  BP(mean): --  RR: 18 (07 Sep 2019 00:23) (17 - 19)  SpO2: 95% (07 Sep 2019 00:23) (95% - 100%)    CAPILLARY BLOOD GLUCOSE                      PHYSICAL EXAM:              GENERAL APPEARANCE: NAD, lethargic  	HEENT:  PERRL, EOMI. + scleral icterus  	NECK: Neck supple, non-tender without lymphadenopathy  	CARDIAC: RRR, S1S2 normal, no murmurs, rubs or gallops  	LUNGS: CTAB, no wheezing, no rales  	ABDOMEN: severe TTP RUQ,  + guarding. No rebound. + Bowel sounds  	MUSCULOSKELETAL: ROM intact  	EXTREMITIES: No significant deformity or joint abnormality. No edema. Peripheral pulses intact.   	NEUROLOGICAL: Strength and sensation symmetric and intact throughout.   	SKIN: Warm and dry.              PSYCHIATRIC: AOx3. Normal affect and behaviour       LABS:                        7.1    27.69 )-----------( 211      ( 06 Sep 2019 16:40 )             22.2     Auto Eosinophil # x     / Auto Eosinophil % x     / Auto Neutrophil # x     / Auto Neutrophil % x     / BANDS % x                            7.1    24.00 )-----------( 215      ( 06 Sep 2019 07:35 )             22.6     Auto Eosinophil # 0.02  / Auto Eosinophil % 0.1   / Auto Neutrophil # 22.20 / Auto Neutrophil % 92.5  / BANDS % x        09-06    137  |  102  |  7   ----------------------------<  348<H>  2.9<LL>   |  20<L>  |  0.37<L>    Ca    7.1<L>      06 Sep 2019 07:35  Mg     1.3     09-06  Phos  1.9     09-06  TPro  5.4<L>  /  Alb  3.2<L>  /  TBili  19.6<H>  /  DBili  5.0<H>  /  AST  16  /  ALT  15  /  AlkPhos  56  09-06    PT/INR - ( 06 Sep 2019 07:35 )   PT: 26.3 SEC;   INR: 2.25          PTT - ( 06 Sep 2019 07:35 )  PTT:30.0 SEC              RADIOLOGY & ADDITIONAL TESTS:      MR MRCP No CON (09/06/2019):    FINDINGS:    LOWER CHEST: Trace bilateral pleural effusions.    LIVER: Within normal limits.   BILE DUCTS: Redemonstrated compression of the common hepatic duct. Common   bile duct stent is present with resolved intrahepatic biliary ductal   dilatation.  GALLBLADDER: Cholelithiasis with large stone at the gallbladder neck,   measuring 1.5 cm. Layering biliary sludge. Gallbladder is distended with   mural thickening and edema.  SPLEEN: Enlarged, measuring up to 16.7 cm.  PANCREAS: Edema within the body of the pancreas.  ADRENALS: Within normal limits.  KIDNEYS/URETERS: Within normal limits.      IMPRESSION:     Acute cholecystitis with Mirizzi syndrome. Interval placement of common   bile duct stent with resolved biliary ductal dilatation.    Probable pancreatitis involving the body of the pancreas.    Splenomegaly.    Imaging Personally Reviewed:    Consultant(s) Notes Reviewed:      Care Discussed with Consultants/Other Providers:

## 2019-09-08 LAB
ALBUMIN SERPL ELPH-MCNC: 3.4 G/DL — SIGNIFICANT CHANGE UP (ref 3.3–5)
ALP SERPL-CCNC: 72 U/L — SIGNIFICANT CHANGE UP (ref 40–120)
ALT FLD-CCNC: 27 U/L — SIGNIFICANT CHANGE UP (ref 4–33)
ANION GAP SERPL CALC-SCNC: 15 MMO/L — HIGH (ref 7–14)
AST SERPL-CCNC: 15 U/L — SIGNIFICANT CHANGE UP (ref 4–32)
BASOPHILS # BLD AUTO: 0.03 K/UL — SIGNIFICANT CHANGE UP (ref 0–0.2)
BASOPHILS NFR BLD AUTO: 0.3 % — SIGNIFICANT CHANGE UP (ref 0–2)
BILIRUB DIRECT SERPL-MCNC: 15.2 MG/DL — HIGH (ref 0.1–0.2)
BILIRUB SERPL-MCNC: 25.4 MG/DL — HIGH (ref 0.2–1.2)
BUN SERPL-MCNC: 8 MG/DL — SIGNIFICANT CHANGE UP (ref 7–23)
C DIFF TOX GENS STL QL NAA+PROBE: SIGNIFICANT CHANGE UP
CALCIUM SERPL-MCNC: 8.8 MG/DL — SIGNIFICANT CHANGE UP (ref 8.4–10.5)
CHLORIDE SERPL-SCNC: 103 MMOL/L — SIGNIFICANT CHANGE UP (ref 98–107)
CO2 SERPL-SCNC: 23 MMOL/L — SIGNIFICANT CHANGE UP (ref 22–31)
CREAT SERPL-MCNC: 0.44 MG/DL — LOW (ref 0.5–1.3)
EOSINOPHIL # BLD AUTO: 0.07 K/UL — SIGNIFICANT CHANGE UP (ref 0–0.5)
EOSINOPHIL NFR BLD AUTO: 0.7 % — SIGNIFICANT CHANGE UP (ref 0–6)
FOLATE SERPL-MCNC: 11 NG/ML — SIGNIFICANT CHANGE UP (ref 4.7–20)
GLUCOSE SERPL-MCNC: 99 MG/DL — SIGNIFICANT CHANGE UP (ref 70–99)
HAPTOGLOB SERPL-MCNC: 88 MG/DL — SIGNIFICANT CHANGE UP (ref 34–200)
HCT VFR BLD CALC: 24.9 % — LOW (ref 34.5–45)
HGB BLD-MCNC: 7.8 G/DL — LOW (ref 11.5–15.5)
IMM GRANULOCYTES NFR BLD AUTO: 1.5 % — SIGNIFICANT CHANGE UP (ref 0–1.5)
LDH SERPL L TO P-CCNC: 195 U/L — SIGNIFICANT CHANGE UP (ref 135–225)
LYMPHOCYTES # BLD AUTO: 0.63 K/UL — LOW (ref 1–3.3)
LYMPHOCYTES # BLD AUTO: 6.6 % — LOW (ref 13–44)
MAGNESIUM SERPL-MCNC: 1.8 MG/DL — SIGNIFICANT CHANGE UP (ref 1.6–2.6)
MCHC RBC-ENTMCNC: 26.7 PG — LOW (ref 27–34)
MCHC RBC-ENTMCNC: 31.3 % — LOW (ref 32–36)
MCV RBC AUTO: 85.3 FL — SIGNIFICANT CHANGE UP (ref 80–100)
MONOCYTES # BLD AUTO: 0.67 K/UL — SIGNIFICANT CHANGE UP (ref 0–0.9)
MONOCYTES NFR BLD AUTO: 7 % — SIGNIFICANT CHANGE UP (ref 2–14)
NEUTROPHILS # BLD AUTO: 8.03 K/UL — HIGH (ref 1.8–7.4)
NEUTROPHILS NFR BLD AUTO: 83.9 % — HIGH (ref 43–77)
NRBC # FLD: 0.04 K/UL — SIGNIFICANT CHANGE UP (ref 0–0)
PHOSPHATE SERPL-MCNC: 2.3 MG/DL — LOW (ref 2.5–4.5)
PLATELET # BLD AUTO: 176 K/UL — SIGNIFICANT CHANGE UP (ref 150–400)
PMV BLD: 10.9 FL — SIGNIFICANT CHANGE UP (ref 7–13)
POTASSIUM SERPL-MCNC: 3.1 MMOL/L — LOW (ref 3.5–5.3)
POTASSIUM SERPL-SCNC: 3.1 MMOL/L — LOW (ref 3.5–5.3)
PROT SERPL-MCNC: 6.1 G/DL — SIGNIFICANT CHANGE UP (ref 6–8.3)
RBC # BLD: 2.92 M/UL — LOW (ref 3.8–5.2)
RBC # FLD: 21 % — HIGH (ref 10.3–14.5)
RETICS #: 310 K/UL — HIGH (ref 25–125)
RETICS/RBC NFR: 10.6 % — HIGH (ref 0.5–2.5)
SODIUM SERPL-SCNC: 141 MMOL/L — SIGNIFICANT CHANGE UP (ref 135–145)
SPECIMEN SOURCE: SIGNIFICANT CHANGE UP
VIT B12 SERPL-MCNC: 1349 PG/ML — HIGH (ref 200–900)
WBC # BLD: 9.57 K/UL — SIGNIFICANT CHANGE UP (ref 3.8–10.5)
WBC # FLD AUTO: 9.57 K/UL — SIGNIFICANT CHANGE UP (ref 3.8–10.5)

## 2019-09-08 PROCEDURE — 99233 SBSQ HOSP IP/OBS HIGH 50: CPT

## 2019-09-08 RX ORDER — POTASSIUM CHLORIDE 20 MEQ
40 PACKET (EA) ORAL ONCE
Refills: 0 | Status: COMPLETED | OUTPATIENT
Start: 2019-09-08 | End: 2019-09-08

## 2019-09-08 RX ORDER — POTASSIUM CHLORIDE 20 MEQ
40 PACKET (EA) ORAL ONCE
Refills: 0 | Status: DISCONTINUED | OUTPATIENT
Start: 2019-09-08 | End: 2019-09-08

## 2019-09-08 RX ORDER — MORPHINE SULFATE 50 MG/1
4 CAPSULE, EXTENDED RELEASE ORAL EVERY 6 HOURS
Refills: 0 | Status: DISCONTINUED | OUTPATIENT
Start: 2019-09-08 | End: 2019-09-12

## 2019-09-08 RX ORDER — ACETAMINOPHEN 500 MG
650 TABLET ORAL EVERY 6 HOURS
Refills: 0 | Status: DISCONTINUED | OUTPATIENT
Start: 2019-09-08 | End: 2019-09-12

## 2019-09-08 RX ORDER — POTASSIUM CHLORIDE 20 MEQ
10 PACKET (EA) ORAL
Refills: 0 | Status: COMPLETED | OUTPATIENT
Start: 2019-09-08 | End: 2019-09-08

## 2019-09-08 RX ORDER — POTASSIUM PHOSPHATE, MONOBASIC POTASSIUM PHOSPHATE, DIBASIC 236; 224 MG/ML; MG/ML
15 INJECTION, SOLUTION INTRAVENOUS ONCE
Refills: 0 | Status: COMPLETED | OUTPATIENT
Start: 2019-09-08 | End: 2019-09-08

## 2019-09-08 RX ADMIN — Medication 1 MILLIGRAM(S): at 12:37

## 2019-09-08 RX ADMIN — SODIUM CHLORIDE 100 MILLILITER(S): 9 INJECTION INTRAMUSCULAR; INTRAVENOUS; SUBCUTANEOUS at 12:32

## 2019-09-08 RX ADMIN — POTASSIUM PHOSPHATE, MONOBASIC POTASSIUM PHOSPHATE, DIBASIC 62.5 MILLIMOLE(S): 236; 224 INJECTION, SOLUTION INTRAVENOUS at 12:32

## 2019-09-08 RX ADMIN — Medication 100 MILLIEQUIVALENT(S): at 15:16

## 2019-09-08 RX ADMIN — MORPHINE SULFATE 4 MILLIGRAM(S): 50 CAPSULE, EXTENDED RELEASE ORAL at 12:35

## 2019-09-08 RX ADMIN — ONDANSETRON 4 MILLIGRAM(S): 8 TABLET, FILM COATED ORAL at 05:35

## 2019-09-08 RX ADMIN — PANTOPRAZOLE SODIUM 40 MILLIGRAM(S): 20 TABLET, DELAYED RELEASE ORAL at 12:37

## 2019-09-08 RX ADMIN — MORPHINE SULFATE 4 MILLIGRAM(S): 50 CAPSULE, EXTENDED RELEASE ORAL at 12:50

## 2019-09-08 RX ADMIN — Medication 40 MILLIEQUIVALENT(S): at 12:36

## 2019-09-08 RX ADMIN — Medication 100 MILLIEQUIVALENT(S): at 17:41

## 2019-09-08 RX ADMIN — Medication 100 MILLIEQUIVALENT(S): at 16:09

## 2019-09-08 RX ADMIN — PIPERACILLIN AND TAZOBACTAM 25 GRAM(S): 4; .5 INJECTION, POWDER, LYOPHILIZED, FOR SOLUTION INTRAVENOUS at 06:20

## 2019-09-08 RX ADMIN — ONDANSETRON 4 MILLIGRAM(S): 8 TABLET, FILM COATED ORAL at 19:42

## 2019-09-08 RX ADMIN — PIPERACILLIN AND TAZOBACTAM 25 GRAM(S): 4; .5 INJECTION, POWDER, LYOPHILIZED, FOR SOLUTION INTRAVENOUS at 14:29

## 2019-09-08 RX ADMIN — PIPERACILLIN AND TAZOBACTAM 25 GRAM(S): 4; .5 INJECTION, POWDER, LYOPHILIZED, FOR SOLUTION INTRAVENOUS at 22:17

## 2019-09-08 NOTE — PROGRESS NOTE ADULT - PROBLEM SELECTOR PLAN 1
-Recent admission 07/2019 for acute cholecystitis and gallstone pancreatitis  Underwent ERCP w/  sphincterotomy and stent placement, w/ balloon sweep and removal of multiple dark stones.  Cholangiogram sig for mild CHD stricture but no fistula. 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  -AB U/S on this admission sig for acute cholecystitis   -MRCP 9/6 sig for acute cholecystitis w/ Mirrizzi syndrome. And possible pancreatitis  -S/p IR Perc quincy tube placement 9/6 w/ 100cc purulent fluid drained  -Gram stain from drainage sig for gram positive cocci in pairs  -can c/w zosyn for now (09/05-  -F/u sensitivities  -c/w tylenol PRN  -c/w dilaudid 1mg q4 PRN  -c/w gentle hydration  -F/u GI recs  -F/u surgery recs, no surgery for now pending, thinks elevated the direct bilirubin due to increase in overall bilirubin and more conversion  -advancing diet as tolerates -Recent admission 07/2019 for acute cholecystitis and gallstone pancreatitis  Underwent ERCP w/ sphincterotomy and stent placement, w/ balloon sweep and removal of multiple dark stones.  Cholangiogram sig for mild CHD stricture but no fistula. 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  -AB U/S on this admission sig for acute cholecystitis   -MRCP 9/6 sig for acute cholecystitis w/ Mirrizzi syndrome. And possible pancreatitis  -S/p IR Perc quincy tube placement 9/6 w/ 100cc purulent fluid drained  -Gram stain from drainage sig for gram positive cocci in pairs  -can c/w zosyn for now (09/05-  -F/u sensitivities  -c/w tylenol PRN  -c/w dilaudid 1mg q4 PRN  -c/w gentle hydration  -F/u GI recs for ERCP  -F/u surgery recs, no surgery for now pending, thinks elevated the direct bilirubin due to increase in overall bilirubin and more conversion  -advancing diet as tolerates -Recent admission 07/2019 for acute cholecystitis and gallstone pancreatitis  Underwent ERCP w/ sphincterotomy and stent placement, w/ balloon sweep and removal of multiple dark stones.  Cholangiogram sig for mild CHD stricture but no fistula. 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  -AB U/S on this admission sig for acute cholecystitis   -MRCP 9/6 sig for acute cholecystitis w/ Mirrizzi syndrome. And possible pancreatitis  -S/p IR Perc quincy tube placement 9/6 w/ 100cc purulent fluid drained  -Gram stain from drainage sig for gram positive cocci in pairs  -can c/w zosyn for now (09/05-  -F/u sensitivities  -c/w tylenol PRN  -c/w dilaudid 1mg q4 PRN  -c/w gentle hydration  -F/u GI recs for ERCP since stone in the CBD and as per surgery the cholecystectomy won't be able to get the stone out since only the  -per GI fellow today, keep NPO after MN and re-eval by primary GI team tomorrow for ERCP  -F/u surgery recs, no surgery for now pending but would do a lap quincy after the ERCP is done  -advancing diet as tolerates

## 2019-09-08 NOTE — PROGRESS NOTE ADULT - PROBLEM SELECTOR PLAN 6
-Likely 2/2 hemolysis given history of HS and GB disease and triggered from recent per quincy placement  -Total bilirubin uptrending along w/ direct T bili  -s/p perc quincy tube placement 09/7 still draining and f/u I/Os and whether IR needs to do a tube check  -Will need elective splenectomy per Heme  -c/w GB workup (see above) -Likely 2/2 hemolysis given history of HS and GB disease and triggered from recent per quincy placement  -Total bilirubin downtrending but increasing direct T bili  -s/p perc quincy tube placement 09/7 still draining and f/u I/Os and whether IR needs to do a tube check  -Will need elective splenectomy per Heme  -c/w GB workup (see above)

## 2019-09-08 NOTE — PROGRESS NOTE ADULT - PROBLEM SELECTOR PLAN 4
Most likely hemolyzing from HS and increased stress from recent per quincy placement  -f/u post-transfusion CBC 7.2, pt refused anymore transfusions for now  -keep Active T&S  -transfuse >7 Hgb  -trend LDH, bilirubin (direct and indirect), haptoglobin daily for now  -f/u heme recs  -may need CTAP angio if still persistent and for any GIB

## 2019-09-08 NOTE — PROGRESS NOTE ADULT - SUBJECTIVE AND OBJECTIVE BOX
Surgery Progress Note    S: Patient seen and examined. Reports pain improved since admission but still present. Per quincy drain with 185cc of bilious output in 24hrs. Tolerating CLD. Patient refused to let me examine her this morning.     O:  Vital Signs Last 24 Hrs  T(C): 37 (08 Sep 2019 05:14), Max: 37.6 (07 Sep 2019 20:55)  T(F): 98.6 (08 Sep 2019 05:14), Max: 99.6 (07 Sep 2019 20:55)  HR: 100 (08 Sep 2019 05:14) (100 - 129)  BP: 107/70 (08 Sep 2019 05:14) (95/55 - 114/56)  BP(mean): --  RR: 18 (08 Sep 2019 05:14) (16 - 18)  SpO2: 97% (08 Sep 2019 05:14) (97% - 100%)    I&O's Detail    07 Sep 2019 07:01  -  08 Sep 2019 07:00  --------------------------------------------------------  IN:    IV PiggyBack: 100 mL    sodium chloride 0.9%.: 1200 mL  Total IN: 1300 mL    OUT:    Drain: 185 mL  Total OUT: 185 mL    Total NET: 1115 mL          MEDICATIONS  (STANDING):  folic acid 1 milliGRAM(s) Oral daily  pantoprazole  Injectable 40 milliGRAM(s) IV Push daily  piperacillin/tazobactam IVPB.. 3.375 Gram(s) IV Intermittent every 8 hours  sodium chloride 0.9%. 1000 milliLiter(s) (100 mL/Hr) IV Continuous <Continuous>    MEDICATIONS  (PRN):  acetaminophen   Tablet .. 650 milliGRAM(s) Oral every 6 hours PRN Temp greater or equal to 38C (100.4F), Mild Pain (1 - 3), Moderate Pain (4 - 6)  morphine  - Injectable 4 milliGRAM(s) IV Push every 6 hours PRN Severe Pain (7 - 10)  ondansetron Injectable 4 milliGRAM(s) IV Push every 8 hours PRN Nausea and/or Vomiting                            7.2    11.06 )-----------( 165      ( 07 Sep 2019 21:20 )             21.9       09-07    137  |  103  |  8   ----------------------------<  146<H>  3.9   |  23  |  0.40<L>    Ca    9.2      07 Sep 2019 06:00  Phos  1.9     09-07  Mg     1.9     09-07    TPro  6.0  /  Alb  3.4  /  TBili  31.2<H>  /  DBili  12.3<H>  /  AST  22  /  ALT  29  /  AlkPhos  73  09-07      Physical Exam:  Gen: Laying in bed, NAD  Resp: Unlabored breathing  Abd: soft, ND, perc quincy with bilious output, unable to palpate pt. refused   Ext: WWP  Skin: No rashes

## 2019-09-08 NOTE — PROGRESS NOTE ADULT - ASSESSMENT
40 yo female with acute cholecystitis complicated by hyperbilirubinemia (T. 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

## 2019-09-08 NOTE — PROGRESS NOTE ADULT - SUBJECTIVE AND OBJECTIVE BOX
Chadd Michael  PGY3 Internal Medicine  Pager [phone] or [phone]    Patient is a 39y old  Female who presents with a chief complaint of Cholecystitis (08 Sep 2019 07:42)    SUBJECTIVE / OVERNIGHT EVENTS:  No f/n/v/d/CP/SOB    MEDICATIONS  (STANDING):  folic acid 1 milliGRAM(s) Oral daily  pantoprazole  Injectable 40 milliGRAM(s) IV Push daily  piperacillin/tazobactam IVPB.. 3.375 Gram(s) IV Intermittent every 8 hours  sodium chloride 0.9%. 1000 milliLiter(s) (100 mL/Hr) IV Continuous <Continuous>    MEDICATIONS  (PRN):  acetaminophen   Tablet .. 650 milliGRAM(s) Oral every 6 hours PRN Temp greater or equal to 38C (100.4F), Mild Pain (1 - 3), Moderate Pain (4 - 6)  morphine  - Injectable 4 milliGRAM(s) IV Push every 6 hours PRN Severe Pain (7 - 10)  ondansetron Injectable 4 milliGRAM(s) IV Push every 8 hours PRN Nausea and/or Vomiting    T(C): 37 (19 @ 05:14), Max: 37.6 (19 @ 20:55)  HR: 100 (19 @ 05:14) (100 - 129)  BP: 107/70 (19 @ 05:14) (95/55 - 114/56)  RR: 18 (19 @ 05:14) (16 - 18)  SpO2: 97% (19 @ 05:14) (97% - 100%)  CAPILLARY BLOOD GLUCOSE    I&O's Summary    07 Sep 2019 07:01  -  08 Sep 2019 07:00  --------------------------------------------------------  IN: 1300 mL / OUT: 185 mL / NET: 1115 mL    PHYSICAL EXAM:  GENERAL APPEARANCE: NAD, lethargic  HEENT:  PERRL, EOMI. + scleral icterus  NECK: Neck supple, non-tender without lymphadenopathy  CARDIAC: RRR, S1S2 normal, no murmurs, rubs or gallops  LUNGS: CTAB, no wheezing, no rales  ABDOMEN: severe TTP RUQ,  + guarding. No rebound. + Bowel sounds  MUSCULOSKELETAL: ROM intact  EXTREMITIES: No significant deformity or joint abnormality. No edema. Peripheral pulses intact.   NEUROLOGICAL: Strength and sensation symmetric and intact throughout.   SKIN: Warm and dry.  PSYCHIATRIC: AOx3. Normal affect and behaviour     LABS:                        7.2    11.06 )-----------( 165      ( 07 Sep 2019 21:20 )             21.9         137  |  103  |  8   ----------------------------<  146<H>  3.9   |  23  |  0.40<L>    Ca    9.2      07 Sep 2019 06:00  Phos  1.9       Mg     1.9         TPro  6.0  /  Alb  3.4  /  TBili  31.2<H>  /  DBili  12.3<H>  /  AST  22  /  ALT  29  /  AlkPhos  73  -07    PT/INR - ( 07 Sep 2019 08:47 )   PT: 20.4 SEC;   INR: 1.80          PTT - ( 07 Sep 2019 08:47 )  PTT:29.9 SEC      Urinalysis Basic - ( 07 Sep 2019 19:14 )    Color: DARK YELLOW / Appearance: Lt TURBID / S.030 / pH: 6.5  Gluc: NEGATIVE / Ketone: TRACE  / Bili: LARGE / Urobili: MODERATE   Blood: TRACE / Protein: 30 / Nitrite: NEGATIVE   Leuk Esterase: TRACE / RBC: 3-5 / WBC 3-5   Sq Epi: OCC / Non Sq Epi: x / Bacteria: NEGATIVE Chadd Rodriguez  PGY3 Internal Medicine  Pager [phone] or [phone]    Patient is a 39y old  Female who presents with a chief complaint of Cholecystitis (08 Sep 2019 07:42)    SUBJECTIVE / OVERNIGHT EVENTS:  No f/n/v/d/CP/SOB but still persistent RUQ pain. Refused overnight another transfusion and only got 2U pRBCs.    MEDICATIONS  (STANDING):  folic acid 1 milliGRAM(s) Oral daily  pantoprazole  Injectable 40 milliGRAM(s) IV Push daily  piperacillin/tazobactam IVPB.. 3.375 Gram(s) IV Intermittent every 8 hours  sodium chloride 0.9%. 1000 milliLiter(s) (100 mL/Hr) IV Continuous <Continuous>    MEDICATIONS  (PRN):  acetaminophen   Tablet .. 650 milliGRAM(s) Oral every 6 hours PRN Temp greater or equal to 38C (100.4F), Mild Pain (1 - 3), Moderate Pain (4 - 6)  morphine  - Injectable 4 milliGRAM(s) IV Push every 6 hours PRN Severe Pain (7 - 10)  ondansetron Injectable 4 milliGRAM(s) IV Push every 8 hours PRN Nausea and/or Vomiting    T(C): 37 (19 @ 05:14), Max: 37.6 (19 @ 20:55)  HR: 100 (19 @ 05:14) (100 - 129)  BP: 107/70 (19 @ 05:14) (95/55 - 114/56)  RR: 18 (19 @ 05:14) (16 - 18)  SpO2: 97% (19 @ 05:14) (97% - 100%)  CAPILLARY BLOOD GLUCOSE    I&O's Summary    07 Sep 2019 07:01  -  08 Sep 2019 07:00  --------------------------------------------------------  IN: 1300 mL / OUT: 185 mL / NET: 1115 mL    PHYSICAL EXAM:  GENERAL APPEARANCE: NAD, lethargic  HEENT:  PERRL, EOMI. + scleral icterus  NECK: Neck supple, non-tender without lymphadenopathy  CARDIAC: RRR, S1S2 normal, no murmurs, rubs or gallops  LUNGS: CTAB, no wheezing, no rales  ABDOMEN: severe TTP RUQ,  + guarding. No rebound. + Bowel sounds  MUSCULOSKELETAL: ROM intact  EXTREMITIES: No significant deformity or joint abnormality. No edema. Peripheral pulses intact.   NEUROLOGICAL: Strength and sensation symmetric and intact throughout.   SKIN: Warm and dry.  PSYCHIATRIC: AOx3. Normal affect and behaviour     LABS:                        7.2    11.06 )-----------( 165      ( 07 Sep 2019 21:20 )             21.9     09-    137  |  103  |  8   ----------------------------<  146<H>  3.9   |  23  |  0.40<L>    Ca    9.2      07 Sep 2019 06:00  Phos  1.9       Mg     1.9         TPro  6.0  /  Alb  3.4  /  TBili  31.2<H>  /  DBili  12.3<H>  /  AST  22  /  ALT  29  /  AlkPhos  73  09-07    PT/INR - ( 07 Sep 2019 08:47 )   PT: 20.4 SEC;   INR: 1.80          PTT - ( 07 Sep 2019 08:47 )  PTT:29.9 SEC      Urinalysis Basic - ( 07 Sep 2019 19:14 )    Color: DARK YELLOW / Appearance: Lt TURBID / S.030 / pH: 6.5  Gluc: NEGATIVE / Ketone: TRACE  / Bili: LARGE / Urobili: MODERATE   Blood: TRACE / Protein: 30 / Nitrite: NEGATIVE   Leuk Esterase: TRACE / RBC: 3-5 / WBC 3-5   Sq Epi: OCC / Non Sq Epi: x / Bacteria: NEGATIVE Chadd Rodriguez  PGY3 Internal Medicine  Pager [phone] or [phone]    Patient is a 39y old  Female who presents with a chief complaint of Cholecystitis (08 Sep 2019 07:42)    SUBJECTIVE / OVERNIGHT EVENTS:  No f/n/v/d/CP/SOB but still persistent RUQ pain. Refused overnight another transfusion and only got 2U pRBCs but is willing to do another transfusion again today. Very upset at surgery resident this morning and has alot of pain    MEDICATIONS  (STANDING):  folic acid 1 milliGRAM(s) Oral daily  pantoprazole  Injectable 40 milliGRAM(s) IV Push daily  piperacillin/tazobactam IVPB.. 3.375 Gram(s) IV Intermittent every 8 hours  sodium chloride 0.9%. 1000 milliLiter(s) (100 mL/Hr) IV Continuous <Continuous>    MEDICATIONS  (PRN):  acetaminophen   Tablet .. 650 milliGRAM(s) Oral every 6 hours PRN Temp greater or equal to 38C (100.4F), Mild Pain (1 - 3), Moderate Pain (4 - 6)  morphine  - Injectable 4 milliGRAM(s) IV Push every 6 hours PRN Severe Pain (7 - 10)  ondansetron Injectable 4 milliGRAM(s) IV Push every 8 hours PRN Nausea and/or Vomiting    T(C): 37 (19 @ 05:14), Max: 37.6 (19 @ 20:55)  HR: 100 (19 @ 05:14) (100 - 129)  BP: 107/70 (19 @ 05:14) (95/55 - 114/56)  RR: 18 (19 @ 05:14) (16 - 18)  SpO2: 97% (19 @ 05:14) (97% - 100%)  CAPILLARY BLOOD GLUCOSE    I&O's Summary    07 Sep 2019 07:01  -  08 Sep 2019 07:00  --------------------------------------------------------  IN: 1300 mL / OUT: 185 mL / NET: 1115 mL    PHYSICAL EXAM:  GENERAL APPEARANCE: NAD, lethargic  HEENT:  PERRL, EOMI. + scleral icterus  NECK: Neck supple, non-tender without lymphadenopathy  CARDIAC: RRR, S1S2 normal, no murmurs, rubs or gallops  LUNGS: CTAB, no wheezing, no rales  ABDOMEN: severe TTP RUQ,  + guarding. No rebound. + Bowel sounds  MUSCULOSKELETAL: ROM intact  EXTREMITIES: No significant deformity or joint abnormality. No edema. Peripheral pulses intact.   NEUROLOGICAL: Strength and sensation symmetric and intact throughout.   SKIN: Warm and dry.  PSYCHIATRIC: AOx3. Normal affect and behaviour     LABS:                        7.2    11.06 )-----------( 165      ( 07 Sep 2019 21:20 )             21.9     -    137  |  103  |  8   ----------------------------<  146<H>  3.9   |  23  |  0.40<L>    Ca    9.2      07 Sep 2019 06:00  Phos  1.9       Mg     1.9         TPro  6.0  /  Alb  3.4  /  TBili  31.2<H>  /  DBili  12.3<H>  /  AST  22  /  ALT  29  /  AlkPhos  73  09-07    PT/INR - ( 07 Sep 2019 08:47 )   PT: 20.4 SEC;   INR: 1.80          PTT - ( 07 Sep 2019 08:47 )  PTT:29.9 SEC      Urinalysis Basic - ( 07 Sep 2019 19:14 )    Color: DARK YELLOW / Appearance: Lt TURBID / S.030 / pH: 6.5  Gluc: NEGATIVE / Ketone: TRACE  / Bili: LARGE / Urobili: MODERATE   Blood: TRACE / Protein: 30 / Nitrite: NEGATIVE   Leuk Esterase: TRACE / RBC: 3-5 / WBC 3-5   Sq Epi: OCC / Non Sq Epi: x / Bacteria: NEGATIVE Chadd Rodriguez  PGY3 Internal Medicine  Pager [phone] or [phone]    Patient is a 39y old  Female who presents with a chief complaint of Cholecystitis (08 Sep 2019 07:42)    SUBJECTIVE / OVERNIGHT EVENTS:  No f/n/v/d/CP/SOB but still persistent RUQ pain. Refused overnight another transfusion and only got 2U pRBCs but is willing to do another transfusion again today. Very upset at surgery resident this morning and has alot of pain w/ diarrhea and moving.    MEDICATIONS  (STANDING):  folic acid 1 milliGRAM(s) Oral daily  pantoprazole  Injectable 40 milliGRAM(s) IV Push daily  piperacillin/tazobactam IVPB.. 3.375 Gram(s) IV Intermittent every 8 hours  sodium chloride 0.9%. 1000 milliLiter(s) (100 mL/Hr) IV Continuous <Continuous>    MEDICATIONS  (PRN):  acetaminophen   Tablet .. 650 milliGRAM(s) Oral every 6 hours PRN Temp greater or equal to 38C (100.4F), Mild Pain (1 - 3), Moderate Pain (4 - 6)  morphine  - Injectable 4 milliGRAM(s) IV Push every 6 hours PRN Severe Pain (7 - 10)  ondansetron Injectable 4 milliGRAM(s) IV Push every 8 hours PRN Nausea and/or Vomiting    T(C): 37 (19 @ 05:14), Max: 37.6 (19 @ 20:55)  HR: 100 (19 @ 05:14) (100 - 129)  BP: 107/70 (19 @ 05:14) (95/55 - 114/56)  RR: 18 (19 @ 05:14) (16 - 18)  SpO2: 97% (19 @ 05:14) (97% - 100%)  CAPILLARY BLOOD GLUCOSE    I&O's Summary    07 Sep 2019 07:01  -  08 Sep 2019 07:00  --------------------------------------------------------  IN: 1300 mL / OUT: 185 mL / NET: 1115 mL    PHYSICAL EXAM:  GENERAL APPEARANCE: NAD, lethargic  HEENT:  PERRL, EOMI. + scleral icterus  NECK: Neck supple, non-tender without lymphadenopathy  CARDIAC: RRR, S1S2 normal, no murmurs, rubs or gallops  LUNGS: CTAB, no wheezing, no rales  ABDOMEN: severe TTP RUQ,  + guarding. No rebound. + Bowel sounds  MUSCULOSKELETAL: ROM intact  EXTREMITIES: No significant deformity or joint abnormality. No edema. Peripheral pulses intact.   NEUROLOGICAL: Strength and sensation symmetric and intact throughout.   SKIN: Warm and dry.  PSYCHIATRIC: AOx3. Normal affect and behaviour     LABS:                        7.2    11.06 )-----------( 165      ( 07 Sep 2019 21:20 )             21.9         137  |  103  |  8   ----------------------------<  146<H>  3.9   |  23  |  0.40<L>    Ca    9.2      07 Sep 2019 06:00  Phos  1.9       Mg     1.9         TPro  6.0  /  Alb  3.4  /  TBili  31.2<H>  /  DBili  12.3<H>  /  AST  22  /  ALT  29  /  AlkPhos  73  -07    PT/INR - ( 07 Sep 2019 08:47 )   PT: 20.4 SEC;   INR: 1.80          PTT - ( 07 Sep 2019 08:47 )  PTT:29.9 SEC      Urinalysis Basic - ( 07 Sep 2019 19:14 )    Color: DARK YELLOW / Appearance: Lt TURBID / S.030 / pH: 6.5  Gluc: NEGATIVE / Ketone: TRACE  / Bili: LARGE / Urobili: MODERATE   Blood: TRACE / Protein: 30 / Nitrite: NEGATIVE   Leuk Esterase: TRACE / RBC: 3-5 / WBC 3-5   Sq Epi: OCC / Non Sq Epi: x / Bacteria: NEGATIVE

## 2019-09-08 NOTE — PROGRESS NOTE ADULT - PROBLEM SELECTOR PLAN 3
Having now 2-3 episodes of nonbloody but painful diarrhea  -f/u C. diff, GI PCR and stool cultures and Ova and Parasites  -c/w Zosyn for now  -unclear if possible MAHA, HUS, TTP but not thrombocytopenic and coags aren't too abnormal yet and no signs of confusion  -c/w stool counts and monitoring for any differences  -if worsening may need CTAP and possible heme recs

## 2019-09-08 NOTE — PROGRESS NOTE ADULT - ATTENDING COMMENTS
discussed with surgery and GI - aware and will follow.  s/p 2 units PRBC - continue with cbc monitoring, pt asymptomatic but is tachycardic - pt refusing further PRBC at this time.  will monitor closely.

## 2019-09-08 NOTE — PROGRESS NOTE ADULT - PROBLEM SELECTOR PLAN 5
-Normocytic anemia  -Hgb 9.1 on admission now downtrending, likely secondary to hemolysis .   -Hgb 6.4 (6/7) and transfused 2 units, did not respond appropriately to 7.2  -F/u post transfusion CBC  -Hematology consulted; recs elective splenectomy following resolution of active infection  -c/w home folic acid  -Maintain active type and screen  -Transfuse Hgb < 7

## 2019-09-08 NOTE — PROGRESS NOTE ADULT - PROBLEM SELECTOR PLAN 2
-Pt met SIRS criteria on admission w/ tachycardia to 106 and leukocytosis to 23.28. Likely 2/2 acute cholecystitis/ pancreatitis  -Leukocytosis downtrended  -Pt persistently tachycardic, though EKG showing sinus tachycardia  -c/w zosyn 09/05-  -Blood cultures NGTD   -Will send UA/Urine culture  -c/w gentle hydration  -Trend CBCs

## 2019-09-09 LAB
-  AMIKACIN: SIGNIFICANT CHANGE UP
-  AMIKACIN: SIGNIFICANT CHANGE UP
-  AMPICILLIN/SULBACTAM: SIGNIFICANT CHANGE UP
-  AMPICILLIN/SULBACTAM: SIGNIFICANT CHANGE UP
-  AMPICILLIN: SIGNIFICANT CHANGE UP
-  AMPICILLIN: SIGNIFICANT CHANGE UP
-  AZTREONAM: SIGNIFICANT CHANGE UP
-  AZTREONAM: SIGNIFICANT CHANGE UP
-  CEFAZOLIN: SIGNIFICANT CHANGE UP
-  CEFAZOLIN: SIGNIFICANT CHANGE UP
-  CEFEPIME: SIGNIFICANT CHANGE UP
-  CEFEPIME: SIGNIFICANT CHANGE UP
-  CEFOXITIN: SIGNIFICANT CHANGE UP
-  CEFOXITIN: SIGNIFICANT CHANGE UP
-  CEFTAZIDIME: SIGNIFICANT CHANGE UP
-  CEFTAZIDIME: SIGNIFICANT CHANGE UP
-  CEFTRIAXONE: SIGNIFICANT CHANGE UP
-  CLINDAMYCIN: SIGNIFICANT CHANGE UP
-  ERTAPENEM: SIGNIFICANT CHANGE UP
-  ERTAPENEM: SIGNIFICANT CHANGE UP
-  ERYTHROMYCIN: SIGNIFICANT CHANGE UP
-  GENTAMICIN: SIGNIFICANT CHANGE UP
-  GENTAMICIN: SIGNIFICANT CHANGE UP
-  IMIPENEM: SIGNIFICANT CHANGE UP
-  IMIPENEM: SIGNIFICANT CHANGE UP
-  LEVOFLOXACIN: SIGNIFICANT CHANGE UP
-  LEVOFLOXACIN: SIGNIFICANT CHANGE UP
-  MEROPENEM: SIGNIFICANT CHANGE UP
-  MEROPENEM: SIGNIFICANT CHANGE UP
-  PENICILLIN G: SIGNIFICANT CHANGE UP
-  PIPERACILLIN/TAZOBACTAM: SIGNIFICANT CHANGE UP
-  PIPERACILLIN/TAZOBACTAM: SIGNIFICANT CHANGE UP
-  TIGECYCLINE: SIGNIFICANT CHANGE UP
-  TIGECYCLINE: SIGNIFICANT CHANGE UP
-  TOBRAMYCIN: SIGNIFICANT CHANGE UP
-  TOBRAMYCIN: SIGNIFICANT CHANGE UP
-  TRIMETHOPRIM/SULFAMETHOXAZOLE: SIGNIFICANT CHANGE UP
-  TRIMETHOPRIM/SULFAMETHOXAZOLE: SIGNIFICANT CHANGE UP
-  VANCOMYCIN: SIGNIFICANT CHANGE UP
ALBUMIN SERPL ELPH-MCNC: 3.3 G/DL — SIGNIFICANT CHANGE UP (ref 3.3–5)
ALP SERPL-CCNC: 111 U/L — SIGNIFICANT CHANGE UP (ref 40–120)
ALT FLD-CCNC: 22 U/L — SIGNIFICANT CHANGE UP (ref 4–33)
ANION GAP SERPL CALC-SCNC: 18 MMO/L — HIGH (ref 7–14)
ANISOCYTOSIS BLD QL: SIGNIFICANT CHANGE UP
APTT BLD: 29.5 SEC — SIGNIFICANT CHANGE UP (ref 27.5–36.3)
AST SERPL-CCNC: 10 U/L — SIGNIFICANT CHANGE UP (ref 4–32)
BACTERIA UR CULT: SIGNIFICANT CHANGE UP
BASOPHILS # BLD AUTO: 0.04 K/UL — SIGNIFICANT CHANGE UP (ref 0–0.2)
BASOPHILS NFR BLD AUTO: 0.5 % — SIGNIFICANT CHANGE UP (ref 0–2)
BASOPHILS NFR SPEC: 0 % — SIGNIFICANT CHANGE UP (ref 0–2)
BILIRUB DIRECT SERPL-MCNC: 10.1 MG/DL — HIGH (ref 0.1–0.2)
BILIRUB SERPL-MCNC: 16.9 MG/DL — HIGH (ref 0.2–1.2)
BLASTS # FLD: 0 % — SIGNIFICANT CHANGE UP (ref 0–0)
BLD GP AB SCN SERPL QL: NEGATIVE — SIGNIFICANT CHANGE UP
BUN SERPL-MCNC: 6 MG/DL — LOW (ref 7–23)
CALCIUM SERPL-MCNC: 8.6 MG/DL — SIGNIFICANT CHANGE UP (ref 8.4–10.5)
CHLORIDE SERPL-SCNC: 97 MMOL/L — LOW (ref 98–107)
CO2 SERPL-SCNC: 23 MMOL/L — SIGNIFICANT CHANGE UP (ref 22–31)
CREAT SERPL-MCNC: 0.46 MG/DL — LOW (ref 0.5–1.3)
CULTURE - SURGICAL SITE: SIGNIFICANT CHANGE UP
ELLIPTOCYTES BLD QL SMEAR: SLIGHT — SIGNIFICANT CHANGE UP
EOSINOPHIL # BLD AUTO: 0.12 K/UL — SIGNIFICANT CHANGE UP (ref 0–0.5)
EOSINOPHIL NFR BLD AUTO: 1.4 % — SIGNIFICANT CHANGE UP (ref 0–6)
EOSINOPHIL NFR FLD: 0 % — SIGNIFICANT CHANGE UP (ref 0–6)
GI PCR PANEL, STOOL: SIGNIFICANT CHANGE UP
GIANT PLATELETS BLD QL SMEAR: PRESENT — SIGNIFICANT CHANGE UP
GLUCOSE SERPL-MCNC: 107 MG/DL — HIGH (ref 70–99)
GRAM STN WND: SIGNIFICANT CHANGE UP
HCT VFR BLD CALC: 23.9 % — LOW (ref 34.5–45)
HCT VFR BLD CALC: 26 % — LOW (ref 34.5–45)
HGB BLD-MCNC: 8 G/DL — LOW (ref 11.5–15.5)
HGB BLD-MCNC: 8.5 G/DL — LOW (ref 11.5–15.5)
HYPOCHROMIA BLD QL: SLIGHT — SIGNIFICANT CHANGE UP
IMM GRANULOCYTES NFR BLD AUTO: 2.3 % — HIGH (ref 0–1.5)
INR BLD: 1.39 — HIGH (ref 0.88–1.17)
LYMPHOCYTES # BLD AUTO: 0.69 K/UL — LOW (ref 1–3.3)
LYMPHOCYTES # BLD AUTO: 8.3 % — LOW (ref 13–44)
LYMPHOCYTES NFR SPEC AUTO: 17.4 % — SIGNIFICANT CHANGE UP (ref 13–44)
MAGNESIUM SERPL-MCNC: 1.6 MG/DL — SIGNIFICANT CHANGE UP (ref 1.6–2.6)
MANUAL SMEAR VERIFICATION: SIGNIFICANT CHANGE UP
MCHC RBC-ENTMCNC: 26.6 PG — LOW (ref 27–34)
MCHC RBC-ENTMCNC: 27.1 PG — SIGNIFICANT CHANGE UP (ref 27–34)
MCHC RBC-ENTMCNC: 32.7 % — SIGNIFICANT CHANGE UP (ref 32–36)
MCHC RBC-ENTMCNC: 33.5 % — SIGNIFICANT CHANGE UP (ref 32–36)
MCV RBC AUTO: 81 FL — SIGNIFICANT CHANGE UP (ref 80–100)
MCV RBC AUTO: 81.5 FL — SIGNIFICANT CHANGE UP (ref 80–100)
METAMYELOCYTES # FLD: 0.9 % — SIGNIFICANT CHANGE UP (ref 0–1)
METHOD TYPE: SIGNIFICANT CHANGE UP
MICROCYTES BLD QL: SIGNIFICANT CHANGE UP
MONOCYTES # BLD AUTO: 0.69 K/UL — SIGNIFICANT CHANGE UP (ref 0–0.9)
MONOCYTES NFR BLD AUTO: 8.3 % — SIGNIFICANT CHANGE UP (ref 2–14)
MONOCYTES NFR BLD: 2.6 % — SIGNIFICANT CHANGE UP (ref 2–9)
MYELOCYTES NFR BLD: 0 % — SIGNIFICANT CHANGE UP (ref 0–0)
NEUTROPHIL AB SER-ACNC: 76.5 % — SIGNIFICANT CHANGE UP (ref 43–77)
NEUTROPHILS # BLD AUTO: 6.6 K/UL — SIGNIFICANT CHANGE UP (ref 1.8–7.4)
NEUTROPHILS NFR BLD AUTO: 79.2 % — HIGH (ref 43–77)
NEUTS BAND # BLD: 1.7 % — SIGNIFICANT CHANGE UP (ref 0–6)
NRBC # FLD: 0.07 K/UL — SIGNIFICANT CHANGE UP (ref 0–0)
NRBC # FLD: 0.08 K/UL — SIGNIFICANT CHANGE UP (ref 0–0)
NRBC FLD-RTO: 1 — SIGNIFICANT CHANGE UP
ORGANISM # SPEC MICROSCOPIC CNT: SIGNIFICANT CHANGE UP
OTHER - HEMATOLOGY %: 0 — SIGNIFICANT CHANGE UP
OVALOCYTES BLD QL SMEAR: SLIGHT — SIGNIFICANT CHANGE UP
PHOSPHATE SERPL-MCNC: 2.7 MG/DL — SIGNIFICANT CHANGE UP (ref 2.5–4.5)
PLATELET # BLD AUTO: 161 K/UL — SIGNIFICANT CHANGE UP (ref 150–400)
PLATELET # BLD AUTO: 176 K/UL — SIGNIFICANT CHANGE UP (ref 150–400)
PLATELET COUNT - ESTIMATE: NORMAL — SIGNIFICANT CHANGE UP
PMV BLD: 9.3 FL — SIGNIFICANT CHANGE UP (ref 7–13)
PMV BLD: 9.8 FL — SIGNIFICANT CHANGE UP (ref 7–13)
POIKILOCYTOSIS BLD QL AUTO: SIGNIFICANT CHANGE UP
POLYCHROMASIA BLD QL SMEAR: SLIGHT — SIGNIFICANT CHANGE UP
POTASSIUM SERPL-MCNC: 3.3 MMOL/L — LOW (ref 3.5–5.3)
POTASSIUM SERPL-SCNC: 3.3 MMOL/L — LOW (ref 3.5–5.3)
PROMYELOCYTES # FLD: 0 % — SIGNIFICANT CHANGE UP (ref 0–0)
PROT SERPL-MCNC: 6 G/DL — SIGNIFICANT CHANGE UP (ref 6–8.3)
PROTHROM AB SERPL-ACNC: 16 SEC — HIGH (ref 9.8–13.1)
RBC # BLD: 2.95 M/UL — LOW (ref 3.8–5.2)
RBC # BLD: 3.19 M/UL — LOW (ref 3.8–5.2)
RBC # FLD: 18.5 % — HIGH (ref 10.3–14.5)
RBC # FLD: 19.4 % — HIGH (ref 10.3–14.5)
RH IG SCN BLD-IMP: POSITIVE — SIGNIFICANT CHANGE UP
SODIUM SERPL-SCNC: 138 MMOL/L — SIGNIFICANT CHANGE UP (ref 135–145)
SPECIMEN SOURCE: SIGNIFICANT CHANGE UP
SPECIMEN SOURCE: SIGNIFICANT CHANGE UP
VARIANT LYMPHS # BLD: 0.9 % — SIGNIFICANT CHANGE UP
WBC # BLD: 7.75 K/UL — SIGNIFICANT CHANGE UP (ref 3.8–10.5)
WBC # BLD: 8.33 K/UL — SIGNIFICANT CHANGE UP (ref 3.8–10.5)
WBC # FLD AUTO: 7.75 K/UL — SIGNIFICANT CHANGE UP (ref 3.8–10.5)
WBC # FLD AUTO: 8.33 K/UL — SIGNIFICANT CHANGE UP (ref 3.8–10.5)

## 2019-09-09 PROCEDURE — 93010 ELECTROCARDIOGRAM REPORT: CPT

## 2019-09-09 PROCEDURE — 99233 SBSQ HOSP IP/OBS HIGH 50: CPT

## 2019-09-09 PROCEDURE — 74019 RADEX ABDOMEN 2 VIEWS: CPT | Mod: 26

## 2019-09-09 PROCEDURE — 71045 X-RAY EXAM CHEST 1 VIEW: CPT | Mod: 26

## 2019-09-09 RX ORDER — POTASSIUM CHLORIDE 20 MEQ
40 PACKET (EA) ORAL ONCE
Refills: 0 | Status: COMPLETED | OUTPATIENT
Start: 2019-09-09 | End: 2019-09-09

## 2019-09-09 RX ORDER — SODIUM CHLORIDE 9 MG/ML
1000 INJECTION, SOLUTION INTRAVENOUS
Refills: 0 | Status: DISCONTINUED | OUTPATIENT
Start: 2019-09-09 | End: 2019-09-11

## 2019-09-09 RX ORDER — POTASSIUM CHLORIDE 20 MEQ
10 PACKET (EA) ORAL
Refills: 0 | Status: COMPLETED | OUTPATIENT
Start: 2019-09-09 | End: 2019-09-09

## 2019-09-09 RX ADMIN — SODIUM CHLORIDE 100 MILLILITER(S): 9 INJECTION, SOLUTION INTRAVENOUS at 06:50

## 2019-09-09 RX ADMIN — ONDANSETRON 4 MILLIGRAM(S): 8 TABLET, FILM COATED ORAL at 20:49

## 2019-09-09 RX ADMIN — PIPERACILLIN AND TAZOBACTAM 25 GRAM(S): 4; .5 INJECTION, POWDER, LYOPHILIZED, FOR SOLUTION INTRAVENOUS at 22:11

## 2019-09-09 RX ADMIN — PIPERACILLIN AND TAZOBACTAM 25 GRAM(S): 4; .5 INJECTION, POWDER, LYOPHILIZED, FOR SOLUTION INTRAVENOUS at 06:17

## 2019-09-09 RX ADMIN — Medication 100 MILLIEQUIVALENT(S): at 07:46

## 2019-09-09 RX ADMIN — Medication 1 MILLIGRAM(S): at 13:03

## 2019-09-09 RX ADMIN — Medication 100 MILLIEQUIVALENT(S): at 06:43

## 2019-09-09 RX ADMIN — PANTOPRAZOLE SODIUM 40 MILLIGRAM(S): 20 TABLET, DELAYED RELEASE ORAL at 13:02

## 2019-09-09 RX ADMIN — PIPERACILLIN AND TAZOBACTAM 25 GRAM(S): 4; .5 INJECTION, POWDER, LYOPHILIZED, FOR SOLUTION INTRAVENOUS at 13:02

## 2019-09-09 RX ADMIN — Medication 100 MILLIEQUIVALENT(S): at 09:22

## 2019-09-09 NOTE — PROVIDER CONTACT NOTE (OTHER) - ACTION/TREATMENT ORDERED:
Will f/u with MD.
As per MD, RN administered IV abx and prn IVP dilaudid as ordered. No need to culture pt as pt had blood cultures drawn yesterday. No need for further assessment/treatment as per MD.
As per MD, RN administered prn IVP dilaudid as ordered.  EKG from today shows sinus tachycardia. No need for further assessment/treatment as per MD.
EKG ordered and done will f/u with MD to evaluate EKG.
No need for STAT CBC at this time as there is no hypotension.  Administer zofran.  Continue to monitor for deterioration in patient status.  No further interventions at this time.
Pain meds given , no orderers made. Will recheck hr again.
Potassium IV given as ordered. NS @ 100cc/hr in progress. EKG done. Pt resting comfortaby at this time. Will monitor
Pt seen by MD, NS 1li bolus given, IV tylenol also given as ordered. will continue to monitor
STAT CBC ordered.
tylenols IVP given. hot packs also offered.
tylenols IVP given. hot packs also offered.

## 2019-09-09 NOTE — PROVIDER CONTACT NOTE (OTHER) - REASON
Need for stat CBC
Pt. c/o dizziness & nausea after bloody BM
Temp 100.5 and 
increase HR
increased HR and temp
pt with c/o sob chest pain
pt with high HR
py 
py 
pt with high hr

## 2019-09-09 NOTE — PROGRESS NOTE ADULT - SUBJECTIVE AND OBJECTIVE BOX
Skye Harrington MD  PGY 1 Department of Internal Medicine  Pager: [phone]    39y old  Female who presents with a chief complaint of Cholecystitis     SUBJECTIVE / OVERNIGHT EVENTS: Pt seen and examined. No acute overnight events.    [  ]Fever [  ]Chills  [  ]Nausea  [  ]Vomiting  [  ]Chest Pain [  ]SOB  [  ]Palpitations  [  ]Abdominal Pain  [  ]Numbness/tingling  [   ]Diarrhea   [   ]Constipation   [  ]New joint aches     MEDICATIONS  (STANDING):  folic acid 1 milliGRAM(s) Oral daily  pantoprazole  Injectable 40 milliGRAM(s) IV Push daily  piperacillin/tazobactam IVPB.. 3.375 Gram(s) IV Intermittent every 8 hours  potassium chloride    Tablet ER 40 milliEquivalent(s) Oral once  potassium chloride  10 mEq/100 mL IVPB 10 milliEquivalent(s) IV Intermittent every 1 hour    MEDICATIONS  (PRN):  acetaminophen   Tablet .. 650 milliGRAM(s) Oral every 6 hours PRN Temp greater or equal to 38C (100.4F), Mild Pain (1 - 3), Moderate Pain (4 - 6)  morphine  - Injectable 4 milliGRAM(s) IV Push every 6 hours PRN Severe Pain (7 - 10)  ondansetron Injectable 4 milliGRAM(s) IV Push every 8 hours PRN Nausea and/or Vomiting      I&O's Summary    07 Sep 2019 07:  -  08 Sep 2019 07:00  --------------------------------------------------------  IN: 1300 mL / OUT: 185 mL / NET: 1115 mL    08 Sep 2019 07:  -  09 Sep 2019 06:10  --------------------------------------------------------  IN: 0 mL / OUT: 50 mL / NET: -50 mL        Vital Signs Last 24 Hrs  T(C): 37.1 (09 Sep 2019 01:55), Max: 37.7 (08 Sep 2019 16:31)  T(F): 98.8 (09 Sep 2019 01:55), Max: 99.8 (08 Sep 2019 16:31)  HR: 103 (09 Sep 2019 01:55) (90 - 125)  BP: 107/62 (09 Sep 2019 01:55) (100/54 - 121/73)  BP(mean): --  RR: 18 (09 Sep 2019 01:55) (16 - 18)  SpO2: 96% (09 Sep 2019 01:55) (96% - 100%)    CAPILLARY BLOOD GLUCOSE          PHYSICAL EXAM:  GENERAL APPEARANCE: NAD, lethargic  HEENT:  PERRL, EOMI. + scleral icterus  NECK: Neck supple, non-tender without lymphadenopathy  CARDIAC: RRR, S1S2 normal, no murmurs, rubs or gallops  LUNGS: CTAB, no wheezing, no rales  ABDOMEN: severe TTP RUQ,  + guarding. No rebound. + Bowel sounds  MUSCULOSKELETAL: ROM intact  EXTREMITIES: No significant deformity or joint abnormality. No edema. Peripheral pulses intact.   NEUROLOGICAL: Strength and sensation symmetric and intact throughout.   SKIN: Warm and dry.  PSYCHIATRIC: AOx3. Normal affect and behaviour       LABS:                        8.0    8.33  )-----------( 161      ( 09 Sep 2019 03:00 )             23.9     Auto Eosinophil # 0.12  / Auto Eosinophil % 1.4   / Auto Neutrophil # 6.60  / Auto Neutrophil % 79.2  / BANDS % 1.7                          7.8    9.57  )-----------( 176      ( 08 Sep 2019 06:15 )             24.9     Auto Eosinophil # 0.07  / Auto Eosinophil % 0.7   / Auto Neutrophil # 8.03  / Auto Neutrophil % 83.9  / BANDS % x                            7.2    11.06 )-----------( 165      ( 07 Sep 2019 21:20 )             21.9     Auto Eosinophil # x     / Auto Eosinophil % x     / Auto Neutrophil # x     / Auto Neutrophil % x     / BANDS % x            138  |  97<L>  |  6<L>  ----------------------------<  107<H>  3.3<L>   |  23  |  0.46<L>      141  |  103  |  8   ----------------------------<  99  3.1<L>   |  23  |  0.44<L>    Ca    8.6      09 Sep 2019 03:00  Mg     1.6       Phos  2.7       TPro  6.0  /  Alb  3.3  /  TBili  16.9<H>  /  DBili  10.1<H>  /  AST  10  /  ALT  22  /  AlkPhos  111    TPro  6.1  /  Alb  3.4  /  TBili  25.4<H>  /  DBili  15.2<H>  /  AST  15  /  ALT  27  /  AlkPhos  72      PT/INR - ( 09 Sep 2019 03:00 )   PT: 16.0 SEC;   INR: 1.39          PTT - ( 09 Sep 2019 03:00 )  PTT:29.5 SEC      Urinalysis Basic - ( 07 Sep 2019 19:14 )    Color: DARK YELLOW / Appearance: Lt TURBID / S.030 / pH: 6.5  Gluc: NEGATIVE / Ketone: TRACE  / Bili: LARGE / Urobili: MODERATE   Blood: TRACE / Protein: 30 / Nitrite: NEGATIVE   Leuk Esterase: TRACE / RBC: 3-5 / WBC 3-5   Sq Epi: OCC / Non Sq Epi: x / Bacteria: NEGATIVE            RADIOLOGY & ADDITIONAL TESTS:    Imaging Personally Reviewed:    Consultant(s) Notes Reviewed:      Care Discussed with Consultants/Other Providers: Skye Harrington MD  PGY 1 Department of Internal Medicine  Pager: [phone]    39y old  Female who presents with a chief complaint of Cholecystitis     SUBJECTIVE / OVERNIGHT EVENTS: Pt seen and examined. No acute overnight events. This AM endorsing abdominal pain (RUQ) as well as diarrhea 2-3 BMs/day per patient, non bloody. Also endorsing pain with bowel movements.     [  ]Fever [  ]Chills  [  ]Nausea  [  ]Vomiting  [  ]Chest Pain [  ]SOB  [  ]Palpitations  [  ]Abdominal Pain  [  ]Numbness/tingling  [   ]Diarrhea   [   ]Constipation   [  ]New joint aches     MEDICATIONS  (STANDING):  folic acid 1 milliGRAM(s) Oral daily  pantoprazole  Injectable 40 milliGRAM(s) IV Push daily  piperacillin/tazobactam IVPB.. 3.375 Gram(s) IV Intermittent every 8 hours  potassium chloride    Tablet ER 40 milliEquivalent(s) Oral once  potassium chloride  10 mEq/100 mL IVPB 10 milliEquivalent(s) IV Intermittent every 1 hour    MEDICATIONS  (PRN):  acetaminophen   Tablet .. 650 milliGRAM(s) Oral every 6 hours PRN Temp greater or equal to 38C (100.4F), Mild Pain (1 - 3), Moderate Pain (4 - 6)  morphine  - Injectable 4 milliGRAM(s) IV Push every 6 hours PRN Severe Pain (7 - 10)  ondansetron Injectable 4 milliGRAM(s) IV Push every 8 hours PRN Nausea and/or Vomiting      I&O's Summary    07 Sep 2019 07:  -  08 Sep 2019 07:00  --------------------------------------------------------  IN: 1300 mL / OUT: 185 mL / NET: 1115 mL    08 Sep 2019 07:  -  09 Sep 2019 06:10  --------------------------------------------------------  IN: 0 mL / OUT: 50 mL / NET: -50 mL        Vital Signs Last 24 Hrs  T(C): 37.1 (09 Sep 2019 01:55), Max: 37.7 (08 Sep 2019 16:31)  T(F): 98.8 (09 Sep 2019 01:55), Max: 99.8 (08 Sep 2019 16:31)  HR: 103 (09 Sep 2019 01:55) (90 - 125)  BP: 107/62 (09 Sep 2019 01:55) (100/54 - 121/73)  BP(mean): --  RR: 18 (09 Sep 2019 01:) (16 - 18)  SpO2: 96% (09 Sep 2019 01:55) (96% - 100%)    CAPILLARY BLOOD GLUCOSE          PHYSICAL EXAM:  GENERAL APPEARANCE: NAD, lethargic  HEENT:  PERRL, EOMI. conjunctiva clear  NECK: Neck supple, non-tender without lymphadenopathy  CARDIAC: RRR, S1S2 normal, no murmurs, rubs or gallops  LUNGS: CTAB, no wheezing, no rales  ABDOMEN: severe TTP RUQ,  + guarding. No rebound. + Bowel sounds  MUSCULOSKELETAL: ROM intact  EXTREMITIES: No significant deformity or joint abnormality. No edema. Peripheral pulses intact.   NEUROLOGICAL: Strength and sensation symmetric and intact throughout.   SKIN: Warm and dry.  PSYCHIATRIC: AOx3. Normal affect and behaviour       LABS:                        8.0    8.33  )-----------( 161      ( 09 Sep 2019 03:00 )             23.9     Auto Eosinophil # 0.12  / Auto Eosinophil % 1.4   / Auto Neutrophil # 6.60  / Auto Neutrophil % 79.2  / BANDS % 1.7                          7.8    9.57  )-----------( 176      ( 08 Sep 2019 06:15 )             24.9     Auto Eosinophil # 0.07  / Auto Eosinophil % 0.7   / Auto Neutrophil # 8.03  / Auto Neutrophil % 83.9  / BANDS % x                            7.2    11.06 )-----------( 165      ( 07 Sep 2019 21:20 )             21.9     Auto Eosinophil # x     / Auto Eosinophil % x     / Auto Neutrophil # x     / Auto Neutrophil % x     / BANDS % x            138  |  97<L>  |  6<L>  ----------------------------<  107<H>  3.3<L>   |  23  |  0.46<L>      141  |  103  |  8   ----------------------------<  99  3.1<L>   |  23  |  0.44<L>    Ca    8.6      09 Sep 2019 03:00  Mg     1.6       Phos  2.7       TPro  6.0  /  Alb  3.3  /  TBili  16.9<H>  /  DBili  10.1<H>  /  AST  10  /  ALT  22  /  AlkPhos  111    TPro  6.1  /  Alb  3.4  /  TBili  25.4<H>  /  DBili  15.2<H>  /  AST  15  /  ALT  27  /  AlkPhos  72      PT/INR - ( 09 Sep 2019 03:00 )   PT: 16.0 SEC;   INR: 1.39          PTT - ( 09 Sep 2019 03:00 )  PTT:29.5 SEC      Urinalysis Basic - ( 07 Sep 2019 19:14 )    Color: DARK YELLOW / Appearance: Lt TURBID / S.030 / pH: 6.5  Gluc: NEGATIVE / Ketone: TRACE  / Bili: LARGE / Urobili: MODERATE   Blood: TRACE / Protein: 30 / Nitrite: NEGATIVE   Leuk Esterase: TRACE / RBC: 3-5 / WBC 3-5   Sq Epi: OCC / Non Sq Epi: x / Bacteria: NEGATIVE            RADIOLOGY & ADDITIONAL TESTS:    Imaging Personally Reviewed:    Consultant(s) Notes Reviewed:      Care Discussed with Consultants/Other Providers: Skye Harrington MD  PGY 1 Department of Internal Medicine  Pager: [phone]    39y old  Female who presents with a chief complaint of Cholecystitis     SUBJECTIVE / OVERNIGHT EVENTS: Pt seen and examined. No acute overnight events. This AM endorsing abdominal pain (RUQ) as well as diarrhea 2-3 BMs/day per patient, non bloody. Also endorsing pain with bowel movements.       MEDICATIONS  (STANDING):  folic acid 1 milliGRAM(s) Oral daily  pantoprazole  Injectable 40 milliGRAM(s) IV Push daily  piperacillin/tazobactam IVPB.. 3.375 Gram(s) IV Intermittent every 8 hours  potassium chloride    Tablet ER 40 milliEquivalent(s) Oral once  potassium chloride  10 mEq/100 mL IVPB 10 milliEquivalent(s) IV Intermittent every 1 hour    MEDICATIONS  (PRN):  acetaminophen   Tablet .. 650 milliGRAM(s) Oral every 6 hours PRN Temp greater or equal to 38C (100.4F), Mild Pain (1 - 3), Moderate Pain (4 - 6)  morphine  - Injectable 4 milliGRAM(s) IV Push every 6 hours PRN Severe Pain (7 - 10)  ondansetron Injectable 4 milliGRAM(s) IV Push every 8 hours PRN Nausea and/or Vomiting      I&O's Summary    07 Sep 2019 07:  -  08 Sep 2019 07:00  --------------------------------------------------------  IN: 1300 mL / OUT: 185 mL / NET: 1115 mL    08 Sep 2019 07:  -  09 Sep 2019 06:10  --------------------------------------------------------  IN: 0 mL / OUT: 50 mL / NET: -50 mL        Vital Signs Last 24 Hrs  T(C): 37.1 (09 Sep 2019 01:55), Max: 37.7 (08 Sep 2019 16:31)  T(F): 98.8 (09 Sep 2019 01:55), Max: 99.8 (08 Sep 2019 16:31)  HR: 103 (09 Sep 2019 01:55) (90 - 125)  BP: 107/62 (09 Sep 2019 01:55) (100/54 - 121/73)  BP(mean): --  RR: 18 (09 Sep 2019 01:55) (16 - 18)  SpO2: 96% (09 Sep 2019 01:55) (96% - 100%)    CAPILLARY BLOOD GLUCOSE          PHYSICAL EXAM:  GENERAL APPEARANCE: NAD, lethargic  HEENT:   EOMI. conjunctiva clear  NECK: Neck supple, non-tender without lymphadenopathy  CARDIAC: RRR, S1S2 normal, no murmurs, rubs or gallops  LUNGS: CTAB, no wheezing, no rales  ABDOMEN: severe TTP RUQ,  + guarding. No rebound. + Bowel sounds  MUSCULOSKELETAL: ROM intact  EXTREMITIES: No significant deformity or joint abnormality. No edema. Peripheral pulses intact.   NEUROLOGICAL: Strength and sensation symmetric and intact throughout.   SKIN: Warm and dry.  PSYCHIATRIC: AOx3. Normal affect and behaviour       LABS:                        8.0    8.33  )-----------( 161      ( 09 Sep 2019 03:00 )             23.9     Auto Eosinophil # 0.12  / Auto Eosinophil % 1.4   / Auto Neutrophil # 6.60  / Auto Neutrophil % 79.2  / BANDS % 1.7                          7.8    9.57  )-----------( 176      ( 08 Sep 2019 06:15 )             24.9     Auto Eosinophil # 0.07  / Auto Eosinophil % 0.7   / Auto Neutrophil # 8.03  / Auto Neutrophil % 83.9  / BANDS % x                            7.2    11.06 )-----------( 165      ( 07 Sep 2019 21:20 )             21.9     Auto Eosinophil # x     / Auto Eosinophil % x     / Auto Neutrophil # x     / Auto Neutrophil % x     / BANDS % x            138  |  97<L>  |  6<L>  ----------------------------<  107<H>  3.3<L>   |  23  |  0.46<L>      141  |  103  |  8   ----------------------------<  99  3.1<L>   |  23  |  0.44<L>    Ca    8.6      09 Sep 2019 03:00  Mg     1.6       Phos  2.7       TPro  6.0  /  Alb  3.3  /  TBili  16.9<H>  /  DBili  10.1<H>  /  AST  10  /  ALT  22  /  AlkPhos  111    TPro  6.1  /  Alb  3.4  /  TBili  25.4<H>  /  DBili  15.2<H>  /  AST  15  /  ALT  27  /  AlkPhos  72      PT/INR - ( 09 Sep 2019 03:00 )   PT: 16.0 SEC;   INR: 1.39          PTT - ( 09 Sep 2019 03:00 )  PTT:29.5 SEC      Urinalysis Basic - ( 07 Sep 2019 19:14 )    Color: DARK YELLOW / Appearance: Lt TURBID / S.030 / pH: 6.5  Gluc: NEGATIVE / Ketone: TRACE  / Bili: LARGE / Urobili: MODERATE   Blood: TRACE / Protein: 30 / Nitrite: NEGATIVE   Leuk Esterase: TRACE / RBC: 3-5 / WBC 3-5   Sq Epi: OCC / Non Sq Epi: x / Bacteria: NEGATIVE            RADIOLOGY & ADDITIONAL TESTS:    Imaging Personally Reviewed:    Consultant(s) Notes Reviewed:      Care Discussed with Consultants/Other Providers:

## 2019-09-09 NOTE — PROVIDER CONTACT NOTE (OTHER) - DATE AND TIME:
08-Sep-2019 00:50
09-Sep-2019 20:40
09-Sep-2019 21:18
05-Sep-2019 22:00
06-Sep-2019 02:00
06-Sep-2019 13:49
06-Sep-2019 13:49
06-Sep-2019 23:15
07-Sep-2019 00:35
07-Sep-2019 21:00
09-Sep-2019 01:56

## 2019-09-09 NOTE — PROGRESS NOTE ADULT - PROBLEM SELECTOR PLAN 4
Most likely hemolyzing from HS and increased stress from recent per quincy placement  -f/u post-transfusion CBC 7.2, pt refused anymore transfusions for now  -keep Active T&S  -transfuse >7 Hgb  -trend LDH, bilirubin (direct and indirect), haptoglobin daily for now  -f/u heme recs  -may need CTAP angio if still persistent and for any GIB -Most likely 2/2 hemolysis from HS in setting of  infection  -H/H decreased but stable  -Maintain Active T&S  -transfuse >7 Hgb  -trend LDH, bilirubin (direct and indirect), haptoglobin daily for now  -f/u heme recs

## 2019-09-09 NOTE — PROGRESS NOTE ADULT - ASSESSMENT
40 yo female with acute cholecystitis complicated by hyperbilirubinemia (T. 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

## 2019-09-09 NOTE — PROVIDER CONTACT NOTE (OTHER) - SITUATION
pt with c/o dizzy bp 107/60mmhg hr 113/mt
Pt admitted for abdominal pain, hx pancreatitis.
Pt admitted for abdominal pain, hx pancreatitis.
Pt's HR was 130 after 1li bolus of NS and tylenol IV
Pt's HR was 130, temp 100.3
Pt. c/o dizziness, lightheadedness, & nausea after walking to bathroom & having a bloody BM.
Pt. continues to c/o dizziness, lightheadedness.
pt noted with HR of 114
pt noted with HR of 115
pt was c/o chest pain and sob
pt with high /mt

## 2019-09-09 NOTE — PROVIDER CONTACT NOTE (OTHER) - ASSESSMENT
pt is c/o dizzy bp 107/60mmhg , hr 113/mt
Pt alert and responsive, no signs of acute respiratory distress, denies pain at this time. RUQ drain dry and intact with cloudy yellowish output
Pt alert and responsive, no signs of acute respiratory distress, stated that pain is tolerable at this time. RUQ drain dry and intact with cloudy yellowish output
Pt sitting in bed, experiencing abdominal pain. Pt is febrile with oral temp of 100.5 and tachycardic with . Pt denies any distress, feverish sweats.
Pt sitting in bed, experiencing abdominal pain. Pt is tachycardic with . Pt denies any distress, feverish sweats.
Pt. continues to c/o lightheadedness & dizziness similar to when she required previous transfusions.
Pt. is c/o dizziness, lightheadedness, & nausea.  Pt. reported that she felt as if she was going to pass out, pt. also reports that this is the same way she felt in the past when she needed blood transfusions.
pt alert and oriented able to make her needs known pt complain of pain to her Abd.
pt alert and oriented able to make her needs known pt complain of pain to her Abd.
pt is asymptomatic , c/o pain
v/s bp 107/62mmhg hr 103/mt spo2 96% on RA  temp 98.8

## 2019-09-09 NOTE — PROVIDER CONTACT NOTE (OTHER) - BACKGROUND
Pt was admitted for acute cholecystis
Pt admitted for abdominal pain, hx pancreatitis.
Pt admitted for abdominal pain, hx pancreatitis.
Pt admitted with acute cholecystitis, s/p percutaneous quincy
Pt admitted with acute cholecystitis, s/p percutaneous quincy
Pt was admitted with cholecystis
Pt. admitted for acute cholecystitis.  Pt. is s/p 4u PRBCs since admission.
Pt. admitted for acute cholecystitis.  S/p 4u PRBCs this admission.
pt admitted with cholecystitis. complaining of abdominal pain with N/V
pt admitted with cholecystitis. complaining of abdominal pain with N/V
pt was admitted with a/c cholecystitis

## 2019-09-09 NOTE — PROVIDER CONTACT NOTE (OTHER) - RECOMMENDATIONS
As per MD will transfuse another PRBC
Administration of IV abx and prn pain medication.
Administration of and prn pain medication.
No orderers made pain meds given
Order STAT CBC to assess need for blood transfusion rather than only monitoring patient's symptoms & VS.
PRN zofran to be administered for nausea.  Consider ordering STAT CBC to assess need for transfusion.  Continue to monitor for continued s/s of dizziness, lightheadedness, & other associated s/s.
Stat EKG, Chest xray
provider notified.
provider notified.
see pt
see pt

## 2019-09-09 NOTE — PROGRESS NOTE ADULT - ATTENDING COMMENTS
-C/w zosyn  -Cx sensitivities noted  -monitor per drainage output. Will f/u IR for plan   -F/u GI recs   -c/w pain control   -Monitor diarrhea - cdiff neg. If remaining studies neg and symptoms persistent can consider loperamide.

## 2019-09-09 NOTE — PROGRESS NOTE ADULT - PROBLEM SELECTOR PLAN 6
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

## 2019-09-09 NOTE — PROGRESS NOTE ADULT - PROBLEM SELECTOR PLAN 5
-Normocytic anemia  -Hgb 9.1 on admission now downtrending, likely secondary to hemolysis .   -Hgb 6.4 (6/7) and transfused 2 units, did not respond appropriately to 7.2  -F/u post transfusion CBC  -Hematology consulted; recs elective splenectomy following resolution of active infection  -c/w home folic acid  -Maintain active type and screen  -Transfuse Hgb < 7 -Normocytic anemia  -Hgb 9.1 on admission now downtrending, likely secondary to hemolysis (see w/o above)  -Hematology consulted; recs elective splenectomy following resolution of active infection  -c/w home folic acid  -Maintain active type and screen  -Transfuse Hgb < 7

## 2019-09-09 NOTE — PROGRESS NOTE ADULT - PROBLEM SELECTOR PLAN 3
Having now 2-3 episodes of nonbloody but painful diarrhea  -f/u C. diff, GI PCR and stool cultures and Ova and Parasites  -c/w Zosyn for now  -unclear if possible MAHA, HUS, TTP but not thrombocytopenic and coags aren't too abnormal yet and no signs of confusion  -c/w stool counts and monitoring for any differences  -if worsening may need CTAP and possible heme recs -Having now 2-3 episodes of nonbloody but painful diarrhea  -C-diff negative  -F/u GI PCR and stool cultures and Ova and Parasites  -c/w Zosyn for now  -if worsening may need CTAP -Having now 2-3 episodes of nonbloody but painful diarrhea  -C-diff negative  -F/u GI PCR and stool cultures and Ova and Parasites  -c/w Zosyn for now

## 2019-09-09 NOTE — CHART NOTE - NSCHARTNOTEFT_GEN_A_CORE
Provider contacted for chest pain. Pt seen and examined at bedside. Pt states she has chest pain that feels like someone pushing their knuckles into her chest. Pain is worsened while lying down and mildly relieved when sitting up. Unchanged by deep breathing or movement. Denies nausea/vomiting, abdominal pain, shortness of breath, lightheadedness/dizziness.     PHYSICAL EXAM:  GENERAL APPEARANCE: NAD  HEENT:  PERRL, EOMI. + scleral icterus  CARDIAC: RRR, S1S2 normal, no murmurs, rubs or gallops  LUNGS: CTAB, no wheezing, no rales  ABDOMEN: severe TTP RUQ, No rebound or guarding.  SKIN: Warm and dry.    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 S/p IR Perc quincy tube placement 9/6 with new-onset chest pain.     Will obtain stat EKG and CXR   Draw AM labs early

## 2019-09-09 NOTE — PROGRESS NOTE ADULT - SUBJECTIVE AND OBJECTIVE BOX
Surgery Progress Note    S: Patient seen and examined. No acute events overnight. C.o of abd. pain. Tolerating PO, denies nausea or vomiting. Having GI fxn. Perc quincy with bilious fluid. Pt. continuing to refuse examination.     O:  Vital Signs Last 24 Hrs  T(C): 37.1 (09 Sep 2019 06:00), Max: 37.7 (08 Sep 2019 16:31)  T(F): 98.7 (09 Sep 2019 06:00), Max: 99.8 (08 Sep 2019 16:31)  HR: 104 (09 Sep 2019 06:00) (90 - 125)  BP: 116/62 (09 Sep 2019 06:00) (100/54 - 121/73)  BP(mean): --  RR: 18 (09 Sep 2019 06:00) (16 - 18)  SpO2: 100% (09 Sep 2019 06:00) (96% - 100%)    I&O's Detail    08 Sep 2019 07:01  -  09 Sep 2019 07:00  --------------------------------------------------------  IN:  Total IN: 0 mL    OUT:    Drain: 50 mL  Total OUT: 50 mL    Total NET: -50 mL          MEDICATIONS  (STANDING):  dextrose 5% + sodium chloride 0.45%. 1000 milliLiter(s) (100 mL/Hr) IV Continuous <Continuous>  folic acid 1 milliGRAM(s) Oral daily  pantoprazole  Injectable 40 milliGRAM(s) IV Push daily  piperacillin/tazobactam IVPB.. 3.375 Gram(s) IV Intermittent every 8 hours  potassium chloride  10 mEq/100 mL IVPB 10 milliEquivalent(s) IV Intermittent every 1 hour    MEDICATIONS  (PRN):  acetaminophen   Tablet .. 650 milliGRAM(s) Oral every 6 hours PRN Temp greater or equal to 38C (100.4F), Mild Pain (1 - 3), Moderate Pain (4 - 6)  morphine  - Injectable 4 milliGRAM(s) IV Push every 6 hours PRN Severe Pain (7 - 10)  ondansetron Injectable 4 milliGRAM(s) IV Push every 8 hours PRN Nausea and/or Vomiting                            8.0    8.33  )-----------( 161      ( 09 Sep 2019 03:00 )             23.9       09-09    138  |  97<L>  |  6<L>  ----------------------------<  107<H>  3.3<L>   |  23  |  0.46<L>    Ca    8.6      09 Sep 2019 03:00  Phos  2.7     09-09  Mg     1.6     09-09    TPro  6.0  /  Alb  3.3  /  TBili  16.9<H>  /  DBili  10.1<H>  /  AST  10  /  ALT  22  /  AlkPhos  111  09-09      Physical Exam:  Gen: Laying in bed, NAD  Resp: Unlabored breathing  Abd: soft, ND, perc. quincy in place, pt. refusing examination   Ext: WWP  Skin: No rashes

## 2019-09-09 NOTE — PROGRESS NOTE ADULT - PROBLEM SELECTOR PLAN 1
-Recent admission 07/2019 for acute cholecystitis and gallstone pancreatitis  Underwent ERCP w/ sphincterotomy and stent placement, w/ balloon sweep and removal of multiple dark stones.  Cholangiogram sig for mild CHD stricture but no fistula. 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  -AB U/S on this admission sig for acute cholecystitis   -MRCP 9/6 sig for acute cholecystitis w/ Mirrizzi syndrome. And possible pancreatitis  -S/p IR Perc quincy tube placement 9/6 w/ 100cc purulent fluid drained  -Wound culture sig for klebsiella, enterobacter. streptococcus  -can c/w zosyn (09/05-  -F/u sensitivities  -c/w tylenol PRN  -c/w morphine 4mg IV q6 PRN   -Possible ERCP today  -F/u surgery recs, no surgery for now pending but would do a lap quincy after the ERCP is done  -NPO for ERCP -Recent admission 07/2019 for acute cholecystitis and gallstone pancreatitis  Underwent ERCP w/ sphincterotomy and stent placement, w/ balloon sweep and removal of multiple dark stones.  Cholangiogram sig for mild CHD stricture but no fistula. 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  -AB U/S on this admission sig for acute cholecystitis   -MRCP 9/6 sig for acute cholecystitis w/ Mirrizzi syndrome. And possible pancreatitis  -S/p IR Perc quincy tube placement 9/6 w/ 100cc purulent fluid drained  -Wound culture sig for klebsiella, enterobacter. streptococcus  -can c/w zosyn (09/05-  -F/u sensitivities  -c/w tylenol PRN  -c/w morphine 4mg IV q6 PRN   -F/u surgery recs, no surgery for now pending but would do a lap quincy after the ERCP is done  -NPO for ERCP -Recent admission 07/2019 for acute cholecystitis and gallstone pancreatitis  Underwent ERCP w/ sphincterotomy and stent placement, w/ balloon sweep and removal of multiple dark stones.  Cholangiogram sig for mild CHD stricture but no fistula. 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  -AB U/S on this admission sig for acute cholecystitis   -MRCP 9/6 sig for acute cholecystitis w/ Mirrizzi syndrome. And possible pancreatitis  -S/p IR Perc quincy tube placement 9/6 w/ 100cc purulent fluid drained  -Wound culture sig for klebsiella, enterobacter. streptococcus  -can c/w zosyn (09/05-  -F/u sensitivities  -c/w tylenol PRN  -c/w morphine 4mg IV q6 PRN   -NPO for ERCP -Recent admission 07/2019 for acute cholecystitis and gallstone pancreatitis  Underwent ERCP w/ sphincterotomy and stent placement, w/ balloon sweep and removal of multiple dark stones.  Cholangiogram sig for mild CHD stricture but no fistula. 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  -AB U/S on this admission sig for acute cholecystitis   -MRCP 9/6 sig for acute cholecystitis w/ Mirrizzi syndrome. And possible pancreatitis  -S/p IR Perc quincy tube placement 9/6 w/ 100cc purulent fluid drained  -Pt to get outpt lap quincy per surgery  -Wound culture sig for klebsiella, enterobacter. streptococcus  -can c/w zosyn (09/05-  -F/u sensitivities  -c/w tylenol PRN  -c/w morphine 4mg IV q6 PRN

## 2019-09-09 NOTE — PROGRESS NOTE ADULT - PROBLEM SELECTOR PLAN 2
-Pt met SIRS criteria on admission w/ tachycardia to 106 and leukocytosis to 23.28. Likely 2/2 acute cholecystitis/ pancreatitis  -Leukocytosis downtrended  -Pt persistently tachycardic, though EKG showing sinus tachycardia  -c/w zosyn 09/05-  -Blood cultures NGTD   -UA negative, F/u urine culture   -c/w gentle hydration  -Trend CBCs -Pt met sepsis criteria on admission w/ tachycardia to 106 and leukocytosis to 23.28. Likely 2/2 acute cholecystitis/ pancreatitis  -Leukocytosis downtrended  -Pt persistently tachycardic, though EKG showing sinus tachycardia  -c/w zosyn 09/05-  -Blood cultures NGTD   -UA negative, F/u urine culture   -c/w gentle hydration  -Trend CBCs

## 2019-09-09 NOTE — PROVIDER CONTACT NOTE (OTHER) - NAME OF MD/NP/PA/DO NOTIFIED:
DR Parr
DR Parr#[id number]
DR Zheng #[id number]
Dr Thomason
Dr Zheng [id number]
Skye Norman
Skye Norman
mario
mario
Dr. Carranza
Dr. aCrranza

## 2019-09-10 DIAGNOSIS — K92.1 MELENA: ICD-10-CM

## 2019-09-10 LAB
ALBUMIN SERPL ELPH-MCNC: 3.4 G/DL — SIGNIFICANT CHANGE UP (ref 3.3–5)
ALP SERPL-CCNC: 160 U/L — HIGH (ref 40–120)
ALT FLD-CCNC: 31 U/L — SIGNIFICANT CHANGE UP (ref 4–33)
ANION GAP SERPL CALC-SCNC: 13 MMO/L — SIGNIFICANT CHANGE UP (ref 7–14)
AST SERPL-CCNC: 27 U/L — SIGNIFICANT CHANGE UP (ref 4–32)
BACTERIA BLD CULT: SIGNIFICANT CHANGE UP
BACTERIA BLD CULT: SIGNIFICANT CHANGE UP
BACTERIA STL CULT: SIGNIFICANT CHANGE UP
BASOPHILS # BLD AUTO: 0.03 K/UL — SIGNIFICANT CHANGE UP (ref 0–0.2)
BASOPHILS NFR BLD AUTO: 0.4 % — SIGNIFICANT CHANGE UP (ref 0–2)
BILIRUB DIRECT SERPL-MCNC: 6.4 MG/DL — HIGH (ref 0.1–0.2)
BILIRUB SERPL-MCNC: 12.1 MG/DL — HIGH (ref 0.2–1.2)
BUN SERPL-MCNC: 5 MG/DL — LOW (ref 7–23)
CALCIUM SERPL-MCNC: 8.7 MG/DL — SIGNIFICANT CHANGE UP (ref 8.4–10.5)
CHLORIDE SERPL-SCNC: 99 MMOL/L — SIGNIFICANT CHANGE UP (ref 98–107)
CO2 SERPL-SCNC: 26 MMOL/L — SIGNIFICANT CHANGE UP (ref 22–31)
CREAT SERPL-MCNC: 0.54 MG/DL — SIGNIFICANT CHANGE UP (ref 0.5–1.3)
EOSINOPHIL # BLD AUTO: 0.15 K/UL — SIGNIFICANT CHANGE UP (ref 0–0.5)
EOSINOPHIL NFR BLD AUTO: 2.1 % — SIGNIFICANT CHANGE UP (ref 0–6)
GLUCOSE SERPL-MCNC: 91 MG/DL — SIGNIFICANT CHANGE UP (ref 70–99)
HAPTOGLOB SERPL-MCNC: 88 MG/DL — SIGNIFICANT CHANGE UP (ref 34–200)
HCT VFR BLD CALC: 24.4 % — LOW (ref 34.5–45)
HGB BLD-MCNC: 8.1 G/DL — LOW (ref 11.5–15.5)
IMM GRANULOCYTES NFR BLD AUTO: 3.9 % — HIGH (ref 0–1.5)
LYMPHOCYTES # BLD AUTO: 0.83 K/UL — LOW (ref 1–3.3)
LYMPHOCYTES # BLD AUTO: 11.6 % — LOW (ref 13–44)
MAGNESIUM SERPL-MCNC: 1.6 MG/DL — SIGNIFICANT CHANGE UP (ref 1.6–2.6)
MCHC RBC-ENTMCNC: 27 PG — SIGNIFICANT CHANGE UP (ref 27–34)
MCHC RBC-ENTMCNC: 33.2 % — SIGNIFICANT CHANGE UP (ref 32–36)
MCV RBC AUTO: 81.3 FL — SIGNIFICANT CHANGE UP (ref 80–100)
MONOCYTES # BLD AUTO: 0.7 K/UL — SIGNIFICANT CHANGE UP (ref 0–0.9)
MONOCYTES NFR BLD AUTO: 9.8 % — SIGNIFICANT CHANGE UP (ref 2–14)
NEUTROPHILS # BLD AUTO: 5.15 K/UL — SIGNIFICANT CHANGE UP (ref 1.8–7.4)
NEUTROPHILS NFR BLD AUTO: 72.2 % — SIGNIFICANT CHANGE UP (ref 43–77)
NRBC # FLD: 0.07 K/UL — SIGNIFICANT CHANGE UP (ref 0–0)
NRBC FLD-RTO: 1 — SIGNIFICANT CHANGE UP
PHOSPHATE SERPL-MCNC: 2.7 MG/DL — SIGNIFICANT CHANGE UP (ref 2.5–4.5)
PLATELET # BLD AUTO: 182 K/UL — SIGNIFICANT CHANGE UP (ref 150–400)
PMV BLD: 10.1 FL — SIGNIFICANT CHANGE UP (ref 7–13)
POTASSIUM SERPL-MCNC: 3 MMOL/L — LOW (ref 3.5–5.3)
POTASSIUM SERPL-SCNC: 3 MMOL/L — LOW (ref 3.5–5.3)
PROT SERPL-MCNC: 6.2 G/DL — SIGNIFICANT CHANGE UP (ref 6–8.3)
RBC # BLD: 3 M/UL — LOW (ref 3.8–5.2)
RBC # FLD: 19.9 % — HIGH (ref 10.3–14.5)
SODIUM SERPL-SCNC: 138 MMOL/L — SIGNIFICANT CHANGE UP (ref 135–145)
SPECIMEN SOURCE: SIGNIFICANT CHANGE UP
WBC # BLD: 7.14 K/UL — SIGNIFICANT CHANGE UP (ref 3.8–10.5)
WBC # FLD AUTO: 7.14 K/UL — SIGNIFICANT CHANGE UP (ref 3.8–10.5)

## 2019-09-10 PROCEDURE — 99233 SBSQ HOSP IP/OBS HIGH 50: CPT

## 2019-09-10 PROCEDURE — 99221 1ST HOSP IP/OBS SF/LOW 40: CPT | Mod: GC

## 2019-09-10 RX ORDER — POTASSIUM CHLORIDE 20 MEQ
10 PACKET (EA) ORAL
Refills: 0 | Status: COMPLETED | OUTPATIENT
Start: 2019-09-10 | End: 2019-09-10

## 2019-09-10 RX ORDER — POTASSIUM CHLORIDE 20 MEQ
40 PACKET (EA) ORAL ONCE
Refills: 0 | Status: COMPLETED | OUTPATIENT
Start: 2019-09-10 | End: 2019-09-10

## 2019-09-10 RX ADMIN — Medication 100 MILLIEQUIVALENT(S): at 14:00

## 2019-09-10 RX ADMIN — Medication 100 MILLIEQUIVALENT(S): at 11:34

## 2019-09-10 RX ADMIN — Medication 1 MILLIGRAM(S): at 11:34

## 2019-09-10 RX ADMIN — PIPERACILLIN AND TAZOBACTAM 25 GRAM(S): 4; .5 INJECTION, POWDER, LYOPHILIZED, FOR SOLUTION INTRAVENOUS at 06:27

## 2019-09-10 RX ADMIN — PIPERACILLIN AND TAZOBACTAM 25 GRAM(S): 4; .5 INJECTION, POWDER, LYOPHILIZED, FOR SOLUTION INTRAVENOUS at 22:12

## 2019-09-10 RX ADMIN — Medication 100 MILLIEQUIVALENT(S): at 14:01

## 2019-09-10 RX ADMIN — ONDANSETRON 4 MILLIGRAM(S): 8 TABLET, FILM COATED ORAL at 20:18

## 2019-09-10 RX ADMIN — PIPERACILLIN AND TAZOBACTAM 25 GRAM(S): 4; .5 INJECTION, POWDER, LYOPHILIZED, FOR SOLUTION INTRAVENOUS at 14:01

## 2019-09-10 RX ADMIN — PANTOPRAZOLE SODIUM 40 MILLIGRAM(S): 20 TABLET, DELAYED RELEASE ORAL at 11:33

## 2019-09-10 NOTE — PROGRESS NOTE ADULT - ASSESSMENT
Impression:  1) Hematochezia with acute blood loss anemia- Pt s/p prbcs for Hgb of 6.7. Currently Hgb is 8.1. Differential diagnosis includes diverticular bleed, angiectasia, anal fissure/hemorrhoids. Unlikely UGIB as patient with stable hemodynamics.  2) Cholecystitis with Mirizzi syndrome- s/p percutaneous drainage    Recommendation: Impression:  1) Hematochezia with acute blood loss anemia- Pt s/p prbcs for Hgb of 6.7. Currently Hgb is 8.1. Differential diagnosis includes diverticular bleed, angiectasia, anal fissure/hemorrhoids. Unlikely UGIB as patient with stable hemodynamics.  2) Cholecystitis with Mirizzi syndrome- s/p percutaneous drainage    Recommendation:  -As patient currently with brown smear on rectal exam, with relatively stable H/H, there is no indication for an urgent endoscopic evaluation at this time  -Patient can follow up with the GI clinic [phone] for outpatient colonoscopy if hematochezia persists  -Monitor BMs  -Rest of care per primary team

## 2019-09-10 NOTE — PROGRESS NOTE ADULT - PROBLEM SELECTOR PLAN 2
-Recent admission 07/2019 for acute cholecystitis and gallstone pancreatitis  Underwent ERCP w/ sphincterotomy and stent placement, w/ balloon sweep and removal of multiple dark stones.  Cholangiogram sig for mild CHD stricture but no fistula. 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  -AB U/S on this admission sig for acute cholecystitis   -MRCP 9/6 sig for acute cholecystitis w/ Mirrizzi syndrome. And possible pancreatitis  -S/p IR Perc quincy tube placement 9/6 w/ 100cc purulent fluid drained  -Pt to get outpt lap quincy per surgery  -Wound culture sig for klebsiella, enterobacter. streptococcus  -can c/w zosyn (09/05-  -F/u sensitivities  -c/w tylenol PRN  -c/w morphine 4mg IV q6 PRN -Most likely 2/2 hemolysis from HS in setting of  infection  -H/H decreased but stable  -Maintain Active T&S  -transfuse >7 Hgb  -trend LDH, bilirubin (direct and indirect), haptoglobin daily for now  -f/u heme recs

## 2019-09-10 NOTE — PROGRESS NOTE ADULT - PROBLEM SELECTOR PLAN 4
-Most likely 2/2 hemolysis from HS in setting of  infection  -H/H decreased but stable  -Maintain Active T&S  -transfuse >7 Hgb  -trend LDH, bilirubin (direct and indirect), haptoglobin daily for now  -f/u heme recs -Pt met sepsis criteria on admission w/ tachycardia to 106 and leukocytosis to 23.28. Likely 2/2 acute cholecystitis/ pancreatitis  -Leukocytosis downtrended  -Pt persistently tachycardic, though EKG showing sinus tachycardia  -c/w zosyn 09/05-  -Blood cultures NGTD   -UA negative, urine culture neg  -encourage PO hydration  -Trend CBCs

## 2019-09-10 NOTE — PROGRESS NOTE ADULT - PROBLEM SELECTOR PLAN 1
-Patient previously w/ watery non-bloody painful BMs 2-3/day x 2 days  -Now w/ bloody BMs and BRBPR x 1 day concerning for colonic ischemia vs infectious colitis  -C-diff negative  -GI PCR negative  -Will get CT Abd to evaluate  -F/u stool Ova and Parasites  -c/w Zosyn for now  -c/w gentle hydration -Patient previously w/ watery non-bloody painful BMs 2-3/day x 2 days  -Now w/ bloody BMs and BRBPR x 1 day concerning for colonic ischemia vs infectious colitis  -C-diff negative  -GI PCR negative  -Might get CT Abd to evaluate  -F/u stool Ova and Parasites  -c/w Zosyn for now  -c/w gentle hydration -Patient previously w/ watery non-bloody painful BMs 2-3/day x 2 days  -Now w/ bloody BMs and BRBPR x 1 day   -C-diff negative  -GI PCR negative  -F/u stool Ova and Parasites  -encourage PO hydration

## 2019-09-10 NOTE — PROGRESS NOTE ADULT - ASSESSMENT
38 Yo f hx of hereditary spherocytosis, Mirizzi syndrome, gallstone pancreatitis, cholecystitis presenting w/ progressively worsening abdominal pain x 1 week, admitted for acute cholecystitis. S/p IR Perc quincy tube placement 9/6. Hospital course c/b bloody BMs 40 Yo f hx of hereditary spherocytosis, Mirizzi syndrome, gallstone pancreatitis, cholecystitis presenting w/ progressively worsening abdominal pain x 1 week, admitted for acute cholecystitis. S/p IR Perc quincy tube placement 9/6. Hospital course now c/b bloody BMs concerning for hemorrhoids vs. ischemic/infectious colitis 38 Yo f hx of hereditary spherocytosis, Mirizzi syndrome, gallstone pancreatitis, cholecystitis presenting w/ progressively worsening abdominal pain x 1 week, admitted for acute cholecystitis. S/p IR Perc quincy tube placement 9/6. Hospital course now c/b bloody BMs

## 2019-09-10 NOTE — PROGRESS NOTE ADULT - ATTENDING COMMENTS
GI consulted for rectal bleeding. Patient reports blood noted only with wiping, possibly exacerbated by recent diarrhea. Stool is non-bloody on this afternoon's examination. No prior history of bleeding. Has chronic anemia 2/2 hereditary spherocytosis. No blood on rectal exam.   Suspect bleeding 2/2 anorectal etiology. Not unreasonable to pursue flexible sigmoidoscopy +/- colonoscopy to further evaluation, but would ideally postpone to outpatient setting (especially given patient's RUQ discomfort with moving in bed, turning to her side for rectal exam).  Please inform GI if ongoing rectal bleeding while hospitalized. Otherwise please ensure GI follow up as outpatient.

## 2019-09-10 NOTE — PROGRESS NOTE ADULT - PROBLEM SELECTOR PLAN 6
-Likely 2/2 hemolysis given history of HS and GB disease and triggered from recent per quincy placement  -Total bilirubin and direct T bili now downtrending  -s/p perc quincy tube placement 09/7 still draining   -F/u I/Os   -Will need elective splenectomy per Heme  -c/w GB workup (see above) left upper arm

## 2019-09-10 NOTE — PROGRESS NOTE ADULT - SUBJECTIVE AND OBJECTIVE BOX
Chief Complaint:  Patient is a 39y old  Female who presents with a chief complaint of Cholecystitis (10 Sep 2019 06:22)      Interval Events: GI now reconsulted for hematochezia x3 days. Hgb today is 8.1, pt's last blood transfusion was on . Pt's lowest Hgb was 6.7 .   ROS: All 12 point system except listed above were otherwise negative.    Allergies:  No Known Allergies        Hospital Medications:  acetaminophen   Tablet .. 650 milliGRAM(s) Oral every 6 hours PRN  dextrose 5% + sodium chloride 0.45%. 1000 milliLiter(s) IV Continuous <Continuous>  folic acid 1 milliGRAM(s) Oral daily  morphine  - Injectable 4 milliGRAM(s) IV Push every 6 hours PRN  ondansetron Injectable 4 milliGRAM(s) IV Push every 8 hours PRN  pantoprazole  Injectable 40 milliGRAM(s) IV Push daily  piperacillin/tazobactam IVPB.. 3.375 Gram(s) IV Intermittent every 8 hours      PMHX/PSHX:  IUP (intrauterine pregnancy), incidental  Mucous polyp of cervix  Hereditary spherocytosis   history      Family history:  Family history of hypertension  Family history of hereditary spherocytosis    There is no family history of peptic ulcer disease, gastric cancer, colon polyps, colon cancer, celiac disease, biliary, hepatic, or pancreatic disease.  None of the female relatives have breast, uterine, or ovarian cancer.     PHYSICAL EXAM:   Vital Signs:  Vital Signs Last 24 Hrs  T(C): 36.7 (10 Sep 2019 14:00), Max: 37.2 (09 Sep 2019 15:29)  T(F): 98 (10 Sep 2019 14:00), Max: 99 (09 Sep 2019 15:29)  HR: 92 (10 Sep 2019 14:00) (90 - 102)  BP: 107/68 (10 Sep 2019 14:00) (107/68 - 118/73)  BP(mean): --  RR: 16 (10 Sep 2019 14:00) (16 - 17)  SpO2: 97% (10 Sep 2019 14:00) (97% - 100%)  Daily     Daily     GENERAL:  Appears stated age  HEENT:  NC/AT,  conjunctivae clear and pink, sclera -anicteric  CHEST:  Full & symmetric excursion, no increased effort, breath sounds clear  HEART:  Regular rhythm, S1, S2, no murmur/rub/S3/S4, no abdominal bruit, no edema  ABDOMEN:  Soft, non-tender, non-distended, normoactive bowel sounds, percutaneous drain in place  EXTREMITIES:  no cyanosis,clubbing or edema  SKIN:  No rash/erythema/ecchymoses  NEURO:  Alert, oriented, no asterixis  PSYCH: Alert and oriented    LABS:                        8.1    7.14  )-----------( 182      ( 10 Sep 2019 05:20 )             24.4     Mean Cell Volume: 81.3 fL (09-10-19 @ 05:20)    09-10    138  |  99  |  5<L>  ----------------------------<  91  3.0<L>   |  26  |  0.54    Ca    8.7      10 Sep 2019 05:20  Phos  2.7     09-10  Mg     1.6     09-10    TPro  6.2  /  Alb  3.4  /  TBili  12.1<H>  /  DBili  6.4<H>  /  AST  27  /  ALT  31  /  AlkPhos  160<H>  09-10    LIVER FUNCTIONS - ( 10 Sep 2019 05:20 )  Alb: 3.4 g/dL / Pro: 6.2 g/dL / ALK PHOS: 160 u/L / ALT: 31 u/L / AST: 27 u/L / GGT: x           PT/INR - ( 09 Sep 2019 03:00 )   PT: 16.0 SEC;   INR: 1.39          PTT - ( 09 Sep 2019 03:00 )  PTT:29.5 SEC                            8.1    7.14  )-----------( 182      ( 10 Sep 2019 05:20 )             24.4                         8.5    7.75  )-----------( 176      ( 09 Sep 2019 21:51 )             26.0                         8.0    8.33  )-----------( 161      ( 09 Sep 2019 03:00 )             23.9                         7.8    9.57  )-----------( 176      ( 08 Sep 2019 06:15 )             24.9                         7.2    11.06 )-----------( 165      ( 07 Sep 2019 21:20 )             21.9     Imaging: Chief Complaint:  Patient is a 39y old  Female who presents with a chief complaint of Cholecystitis (10 Sep 2019 06:22)      Interval Events: GI now reconsulted for hematochezia x3 days. Hgb today is 8.1, pt's last blood transfusion was on . Pt's lowest Hgb was 6.7 . Per patient, pt states that she noticed blood when she wipes. Pt had no prior EGD/colonoscopy in the past. Pt also endorses abdominal pain at around percutaneous drainage site.     ROS: All 12 point system except listed above were otherwise negative.    Allergies:  No Known Allergies        Hospital Medications:  acetaminophen   Tablet .. 650 milliGRAM(s) Oral every 6 hours PRN  dextrose 5% + sodium chloride 0.45%. 1000 milliLiter(s) IV Continuous <Continuous>  folic acid 1 milliGRAM(s) Oral daily  morphine  - Injectable 4 milliGRAM(s) IV Push every 6 hours PRN  ondansetron Injectable 4 milliGRAM(s) IV Push every 8 hours PRN  pantoprazole  Injectable 40 milliGRAM(s) IV Push daily  piperacillin/tazobactam IVPB.. 3.375 Gram(s) IV Intermittent every 8 hours      PMHX/PSHX:  IUP (intrauterine pregnancy), incidental  Mucous polyp of cervix  Hereditary spherocytosis   history      Family history:  Family history of hypertension  Family history of hereditary spherocytosis    There is no family history of peptic ulcer disease, gastric cancer, colon polyps, colon cancer, celiac disease, biliary, hepatic, or pancreatic disease.  None of the female relatives have breast, uterine, or ovarian cancer.     PHYSICAL EXAM:   Vital Signs:  Vital Signs Last 24 Hrs  T(C): 36.7 (10 Sep 2019 14:00), Max: 37.2 (09 Sep 2019 15:29)  T(F): 98 (10 Sep 2019 14:00), Max: 99 (09 Sep 2019 15:29)  HR: 92 (10 Sep 2019 14:00) (90 - 102)  BP: 107/68 (10 Sep 2019 14:00) (107/68 - 118/73)  BP(mean): --  RR: 16 (10 Sep 2019 14:00) (16 - 17)  SpO2: 97% (10 Sep 2019 14:00) (97% - 100%)  Daily     Daily     GENERAL:  Appears stated age  HEENT:  NC/AT,  conjunctivae clear and pink, sclera -anicteric  CHEST:  Full & symmetric excursion, no increased effort, breath sounds clear  HEART:  Regular rhythm, S1, S2, no murmur/rub/S3/S4, no abdominal bruit, no edema  ABDOMEN:  Soft, non-tender, non-distended, normoactive bowel sounds, percutaneous drain in place  EXTREMITIES:  no cyanosis,clubbing or edema  SKIN:  No rash/erythema/ecchymoses  NEURO:  Alert, oriented, no asterixis  PSYCH: Alert and oriented  RECTAL: Brown stool    LABS:                        8.1    7.14  )-----------( 182      ( 10 Sep 2019 05:20 )             24.4     Mean Cell Volume: 81.3 fL (09-10- @ 05:20)    09-10    138  |  99  |  5<L>  ----------------------------<  91  3.0<L>   |  26  |  0.54    Ca    8.7      10 Sep 2019 05:20  Phos  2.7     09-10  Mg     1.6     09-10    TPro  6.2  /  Alb  3.4  /  TBili  12.1<H>  /  DBili  6.4<H>  /  AST  27  /  ALT  31  /  AlkPhos  160<H>  09-10    LIVER FUNCTIONS - ( 10 Sep 2019 05:20 )  Alb: 3.4 g/dL / Pro: 6.2 g/dL / ALK PHOS: 160 u/L / ALT: 31 u/L / AST: 27 u/L / GGT: x           PT/INR - ( 09 Sep 2019 03:00 )   PT: 16.0 SEC;   INR: 1.39          PTT - ( 09 Sep 2019 03:00 )  PTT:29.5 SEC                            8.1    7.14  )-----------( 182      ( 10 Sep 2019 05:20 )             24.4                         8.5    7.75  )-----------( 176      ( 09 Sep 2019 21:51 )             26.0                         8.0    8.33  )-----------( 161      ( 09 Sep 2019 03:00 )             23.9                         7.8    9.57  )-----------( 176      ( 08 Sep 2019 06:15 )             24.9                         7.2    11.06 )-----------( 165      ( 07 Sep 2019 21:20 )             21.9     Imaging: Chief Complaint:  Patient is a 39y old  Female who presents with a chief complaint of Cholecystitis (10 Sep 2019 06:22)      Interval Events: GI now reconsulted for hematochezia x3 days. Hgb today is 8.1, pt's last blood transfusion was on . Pt's lowest Hgb was 6.7 . Per patient, pt states that she noticed blood when she wipes. Pt had no prior EGD/colonoscopy in the past. Pt also endorses abdominal pain at around percutaneous drainage site.     ROS: All 12 point system except listed above were otherwise negative.    Allergies:  No Known Allergies        Hospital Medications:  acetaminophen   Tablet .. 650 milliGRAM(s) Oral every 6 hours PRN  dextrose 5% + sodium chloride 0.45%. 1000 milliLiter(s) IV Continuous <Continuous>  folic acid 1 milliGRAM(s) Oral daily  morphine  - Injectable 4 milliGRAM(s) IV Push every 6 hours PRN  ondansetron Injectable 4 milliGRAM(s) IV Push every 8 hours PRN  pantoprazole  Injectable 40 milliGRAM(s) IV Push daily  piperacillin/tazobactam IVPB.. 3.375 Gram(s) IV Intermittent every 8 hours      PMHX/PSHX:  IUP (intrauterine pregnancy), incidental  Mucous polyp of cervix  Hereditary spherocytosis   history      Family history:  Family history of hypertension  Family history of hereditary spherocytosis    There is no family history of peptic ulcer disease, gastric cancer, colon polyps, colon cancer, celiac disease, biliary, hepatic, or pancreatic disease.  None of the female relatives have breast, uterine, or ovarian cancer.     PHYSICAL EXAM:   Vital Signs:  Vital Signs Last 24 Hrs  T(C): 36.7 (10 Sep 2019 14:00), Max: 37.2 (09 Sep 2019 15:29)  T(F): 98 (10 Sep 2019 14:00), Max: 99 (09 Sep 2019 15:29)  HR: 92 (10 Sep 2019 14:00) (90 - 102)  BP: 107/68 (10 Sep 2019 14:00) (107/68 - 118/73)  BP(mean): --  RR: 16 (10 Sep 2019 14:00) (16 - 17)  SpO2: 97% (10 Sep 2019 14:00) (97% - 100%)  Daily     Daily     GENERAL:  Appears stated age  HEENT:  NC/AT,  conjunctivae clear and pink, sclera -anicteric  CHEST:  Full & symmetric excursion, no increased effort, breath sounds clear  HEART:  Regular rhythm, S1, S2, no murmur/rub/S3/S4, no abdominal bruit, no edema  ABDOMEN:  Soft, non-tender, non-distended, normoactive bowel sounds, percutaneous drain in place with bilious fluid  EXTREMITIES:  no cyanosis,clubbing or edema  SKIN:  No rash/erythema/ecchymoses  NEURO:  Alert, oriented, no asterixis  PSYCH: Alert and oriented  RECTAL: Light brown stool    LABS:                        8.1    7.14  )-----------( 182      ( 10 Sep 2019 05:20 )             24.4     Mean Cell Volume: 81.3 fL (09-10-19 @ 05:20)    09-10    138  |  99  |  5<L>  ----------------------------<  91  3.0<L>   |  26  |  0.54    Ca    8.7      10 Sep 2019 05:20  Phos  2.7     09-10  Mg     1.6     09-10    TPro  6.2  /  Alb  3.4  /  TBili  12.1<H>  /  DBili  6.4<H>  /  AST  27  /  ALT  31  /  AlkPhos  160<H>  09-10    LIVER FUNCTIONS - ( 10 Sep 2019 05:20 )  Alb: 3.4 g/dL / Pro: 6.2 g/dL / ALK PHOS: 160 u/L / ALT: 31 u/L / AST: 27 u/L / GGT: x           PT/INR - ( 09 Sep 2019 03:00 )   PT: 16.0 SEC;   INR: 1.39          PTT - ( 09 Sep 2019 03:00 )  PTT:29.5 SEC                            8.1    7.14  )-----------( 182      ( 10 Sep 2019 05:20 )             24.4                         8.5    7.75  )-----------( 176      ( 09 Sep 2019 21:51 )             26.0                         8.0    8.33  )-----------( 161      ( 09 Sep 2019 03:00 )             23.9                         7.8    9.57  )-----------( 176      ( 08 Sep 2019 06:15 )             24.9                         7.2    11.06 )-----------( 165      ( 07 Sep 2019 21:20 )             21.9     Imaging:

## 2019-09-10 NOTE — CHART NOTE - NSCHARTNOTEFT_GEN_A_CORE
Spoke to team multiples that cholecystectomy will not be performed on this admission. Patient has perc. quincy tube in place that is draining adequately with bilious output. Pt. has hereditary spherocytosis with active hemolysis on this admission contributing to hyperbilirubinemia, with now downtrending bilirubin. Hyperbilirubinemia likely not due to acute cholecystitis. Pt. has known stricture which has been stented by GI in May, could result in elevated direct bilirubin if obstructed; however, now labs downtrending. Surgery will be planned as outpatient once inflammation improved and tract has formed.     [id number] Spoke to team multiple times (at [facility] # [id number]) that cholecystectomy will not be performed on this admission. Patient has perc. quincy tube in place that is draining adequately with bilious output. Pt. has hereditary spherocytosis with active hemolysis on this admission contributing to hyperbilirubinemia, with now downtrending bilirubin. Hyperbilirubinemia likely not due to acute cholecystitis. Pt. has known stricture which has been stented by GI in May, could result in elevated direct bilirubin if obstructed; however, now labs downtrending. Surgery will be planned as outpatient once inflammation improved and tract has formed.     [id number]

## 2019-09-10 NOTE — PROGRESS NOTE ADULT - ATTENDING COMMENTS
ON night events noted. Pt seen and examined. She reports continued 3x4 episodes of diarrhea here. Staff reporting BRBPR However pt does not report seeing blood in her stool.     No further intervention at this time while inpatient per GI and surgery.   -monitor H/H closely  -if diarrhea continues will start trial of lomotil as her w/u for c.diff, GI PCR, stool cx all negative  -will f/u w/ IR in regards to drain removal   -continue to optimize pain control  -c/w zosyn for now

## 2019-09-10 NOTE — PROGRESS NOTE ADULT - SUBJECTIVE AND OBJECTIVE BOX
Skye Harrington MD  PGY 1 Department of Internal Medicine  Pager: [phone]    39y old  Female who presents with a chief complaint of Cholecystitis       SUBJECTIVE / OVERNIGHT EVENTS: Pt seen and examined. No acute overnight events.    [  ]Fever [  ]Chills  [  ]Nausea  [  ]Vomiting  [  ]Chest Pain [  ]SOB  [  ]Palpitations  [  ]Abdominal Pain  [  ]Numbness/tingling  [   ]Diarrhea   [   ]Constipation   [  ]New joint aches     MEDICATIONS  (STANDING):  dextrose 5% + sodium chloride 0.45%. 1000 milliLiter(s) (100 mL/Hr) IV Continuous <Continuous>  folic acid 1 milliGRAM(s) Oral daily  pantoprazole  Injectable 40 milliGRAM(s) IV Push daily  piperacillin/tazobactam IVPB.. 3.375 Gram(s) IV Intermittent every 8 hours    MEDICATIONS  (PRN):  acetaminophen   Tablet .. 650 milliGRAM(s) Oral every 6 hours PRN Temp greater or equal to 38C (100.4F), Mild Pain (1 - 3), Moderate Pain (4 - 6)  morphine  - Injectable 4 milliGRAM(s) IV Push every 6 hours PRN Severe Pain (7 - 10)  ondansetron Injectable 4 milliGRAM(s) IV Push every 8 hours PRN Nausea and/or Vomiting      I&O's Summary    08 Sep 2019 07:01  -  09 Sep 2019 07:00  --------------------------------------------------------  IN: 0 mL / OUT: 50 mL / NET: -50 mL    09 Sep 2019 07:01  -  10 Sep 2019 06:23  --------------------------------------------------------  IN: 0 mL / OUT: 100 mL / NET: -100 mL        Vital Signs Last 24 Hrs  T(C): 36.7 (10 Sep 2019 02:00), Max: 37.2 (09 Sep 2019 15:29)  T(F): 98 (10 Sep 2019 02:00), Max: 99 (09 Sep 2019 15:29)  HR: 90 (10 Sep 2019 02:00) (90 - 102)  BP: 108/69 (10 Sep 2019 02:00) (108/69 - 118/73)  BP(mean): --  RR: 17 (10 Sep 2019 02:00) (17 - 17)  SpO2: 98% (10 Sep 2019 02:00) (98% - 100%)    CAPILLARY BLOOD GLUCOSE          PHYSICAL EXAM:  GENERAL APPEARANCE: NAD, lethargic  HEENT:   EOMI. conjunctiva clear  NECK: Neck supple, non-tender without lymphadenopathy  CARDIAC: RRR, S1S2 normal, no murmurs, rubs or gallops  LUNGS: CTAB, no wheezing, no rales  ABDOMEN: severe TTP RUQ,  + guarding. No rebound. + Bowel sounds  MUSCULOSKELETAL: ROM intact  EXTREMITIES: No significant deformity or joint abnormality. No edema. Peripheral pulses intact.   NEUROLOGICAL: Strength and sensation symmetric and intact throughout.   SKIN: Warm and dry.  PSYCHIATRIC: AOx3. Normal affect and behaviour       LABS:                        8.5    7.75  )-----------( 176      ( 09 Sep 2019 21:51 )             26.0     Auto Eosinophil # x     / Auto Eosinophil % x     / Auto Neutrophil # x     / Auto Neutrophil % x     / BANDS % x                            8.0    8.33  )-----------( 161      ( 09 Sep 2019 03:00 )             23.9     Auto Eosinophil # 0.12  / Auto Eosinophil % 1.4   / Auto Neutrophil # 6.60  / Auto Neutrophil % 79.2  / BANDS % 1.7      09-09    138  |  97<L>  |  6<L>  ----------------------------<  107<H>  3.3<L>   |  23  |  0.46<L>    Ca    8.6      09 Sep 2019 03:00  Mg     1.6     09-09  Phos  2.7     09-09  TPro  6.0  /  Alb  3.3  /  TBili  16.9<H>  /  DBili  10.1<H>  /  AST  10  /  ALT  22  /  AlkPhos  111  09-09    PT/INR - ( 09 Sep 2019 03:00 )   PT: 16.0 SEC;   INR: 1.39          PTT - ( 09 Sep 2019 03:00 )  PTT:29.5 SEC              RADIOLOGY & ADDITIONAL TESTS:    Imaging Personally Reviewed:    Consultant(s) Notes Reviewed:      Care Discussed with Consultants/Other Providers: Skye Harrington MD  PGY 1 Department of Internal Medicine  Pager: [phone]    39y old  Female who presents with a chief complaint of Cholecystitis       SUBJECTIVE / OVERNIGHT EVENTS: Pt seen and examined. C/o of dizziness/lightheadedness overnight. At the time RN also noted BRBPR. Per pt blood also in toilet bowl/in BM. STAT CBC gotten. H/H stable Hgb 8.5. This AM, pt endorses painful bloody diarrhea x 3 days. When asked again she states blood only "when wiping". Hx unclear.     [  ]Fever [  ]Chills  [  ]Nausea  [  ]Vomiting  [  ]Chest Pain [  ]SOB  [  ]Palpitations  [  ]Abdominal Pain  [  ]Numbness/tingling  [   ]Diarrhea   [   ]Constipation   [  ]New joint aches     MEDICATIONS  (STANDING):  dextrose 5% + sodium chloride 0.45%. 1000 milliLiter(s) (100 mL/Hr) IV Continuous <Continuous>  folic acid 1 milliGRAM(s) Oral daily  pantoprazole  Injectable 40 milliGRAM(s) IV Push daily  piperacillin/tazobactam IVPB.. 3.375 Gram(s) IV Intermittent every 8 hours    MEDICATIONS  (PRN):  acetaminophen   Tablet .. 650 milliGRAM(s) Oral every 6 hours PRN Temp greater or equal to 38C (100.4F), Mild Pain (1 - 3), Moderate Pain (4 - 6)  morphine  - Injectable 4 milliGRAM(s) IV Push every 6 hours PRN Severe Pain (7 - 10)  ondansetron Injectable 4 milliGRAM(s) IV Push every 8 hours PRN Nausea and/or Vomiting      I&O's Summary    08 Sep 2019 07:01  -  09 Sep 2019 07:00  --------------------------------------------------------  IN: 0 mL / OUT: 50 mL / NET: -50 mL    09 Sep 2019 07:01  -  10 Sep 2019 06:23  --------------------------------------------------------  IN: 0 mL / OUT: 100 mL / NET: -100 mL        Vital Signs Last 24 Hrs  T(C): 36.7 (10 Sep 2019 02:00), Max: 37.2 (09 Sep 2019 15:29)  T(F): 98 (10 Sep 2019 02:00), Max: 99 (09 Sep 2019 15:29)  HR: 90 (10 Sep 2019 02:00) (90 - 102)  BP: 108/69 (10 Sep 2019 02:00) (108/69 - 118/73)  BP(mean): --  RR: 17 (10 Sep 2019 02:00) (17 - 17)  SpO2: 98% (10 Sep 2019 02:00) (98% - 100%)    CAPILLARY BLOOD GLUCOSE          PHYSICAL EXAM:  GENERAL APPEARANCE: NAD, lethargic  HEENT:   EOMI. conjunctiva clear  NECK: Neck supple, non-tender without lymphadenopathy  CARDIAC: RRR, S1S2 normal, no murmurs, rubs or gallops  LUNGS: CTAB, no wheezing, no rales  ABDOMEN: severe TTP RUQ,  + guarding. No rebound. + Bowel sounds  MUSCULOSKELETAL: ROM intact  EXTREMITIES: No significant deformity or joint abnormality. No edema. Peripheral pulses intact.   NEUROLOGICAL: Strength and sensation symmetric and intact throughout.   SKIN: Warm and dry.  PSYCHIATRIC: AOx3. Normal affect and behaviour       LABS:                        8.5    7.75  )-----------( 176      ( 09 Sep 2019 21:51 )             26.0     Auto Eosinophil # x     / Auto Eosinophil % x     / Auto Neutrophil # x     / Auto Neutrophil % x     / BANDS % x                            8.0    8.33  )-----------( 161      ( 09 Sep 2019 03:00 )             23.9     Auto Eosinophil # 0.12  / Auto Eosinophil % 1.4   / Auto Neutrophil # 6.60  / Auto Neutrophil % 79.2  / BANDS % 1.7      09-09    138  |  97<L>  |  6<L>  ----------------------------<  107<H>  3.3<L>   |  23  |  0.46<L>    Ca    8.6      09 Sep 2019 03:00  Mg     1.6     09-09  Phos  2.7     09-09  TPro  6.0  /  Alb  3.3  /  TBili  16.9<H>  /  DBili  10.1<H>  /  AST  10  /  ALT  22  /  AlkPhos  111  09-09    PT/INR - ( 09 Sep 2019 03:00 )   PT: 16.0 SEC;   INR: 1.39          PTT - ( 09 Sep 2019 03:00 )  PTT:29.5 SEC              RADIOLOGY & ADDITIONAL TESTS:    Imaging Personally Reviewed:    Consultant(s) Notes Reviewed:      Care Discussed with Consultants/Other Providers: Skye Harrington MD  PGY 1 Department of Internal Medicine  Pager: [phone]    39y old  Female who presents with a chief complaint of Cholecystitis       SUBJECTIVE / OVERNIGHT EVENTS: Pt seen and examined. C/o of dizziness/lightheadedness overnight. At the time RN also noted BRBPR. Per pt blood also in toilet bowl/in BM. STAT CBC gotten. H/H stable Hgb 8.5. This AM, pt endorses painful bloody diarrhea x 3 days. When asked again she states blood only "when wiping". Hx unclear.       MEDICATIONS  (STANDING):  dextrose 5% + sodium chloride 0.45%. 1000 milliLiter(s) (100 mL/Hr) IV Continuous <Continuous>  folic acid 1 milliGRAM(s) Oral daily  pantoprazole  Injectable 40 milliGRAM(s) IV Push daily  piperacillin/tazobactam IVPB.. 3.375 Gram(s) IV Intermittent every 8 hours    MEDICATIONS  (PRN):  acetaminophen   Tablet .. 650 milliGRAM(s) Oral every 6 hours PRN Temp greater or equal to 38C (100.4F), Mild Pain (1 - 3), Moderate Pain (4 - 6)  morphine  - Injectable 4 milliGRAM(s) IV Push every 6 hours PRN Severe Pain (7 - 10)  ondansetron Injectable 4 milliGRAM(s) IV Push every 8 hours PRN Nausea and/or Vomiting      I&O's Summary    08 Sep 2019 07:01  -  09 Sep 2019 07:00  --------------------------------------------------------  IN: 0 mL / OUT: 50 mL / NET: -50 mL    09 Sep 2019 07:01  -  10 Sep 2019 06:23  --------------------------------------------------------  IN: 0 mL / OUT: 100 mL / NET: -100 mL        Vital Signs Last 24 Hrs  T(C): 36.7 (10 Sep 2019 02:00), Max: 37.2 (09 Sep 2019 15:29)  T(F): 98 (10 Sep 2019 02:00), Max: 99 (09 Sep 2019 15:29)  HR: 90 (10 Sep 2019 02:00) (90 - 102)  BP: 108/69 (10 Sep 2019 02:00) (108/69 - 118/73)  BP(mean): --  RR: 17 (10 Sep 2019 02:00) (17 - 17)  SpO2: 98% (10 Sep 2019 02:00) (98% - 100%)    CAPILLARY BLOOD GLUCOSE          PHYSICAL EXAM:  GENERAL APPEARANCE: NAD, lethargic  HEENT:   EOMI. conjunctiva clear  NECK: Neck supple, non-tender without lymphadenopathy  CARDIAC: RRR, S1S2 normal, no murmurs, rubs or gallops  LUNGS: CTAB, no wheezing, no rales  ABDOMEN: severe TTP RUQ,  + guarding. No rebound. + Bowel sounds  MUSCULOSKELETAL: ROM intact  EXTREMITIES: No significant deformity or joint abnormality. No edema. Peripheral pulses intact.   NEUROLOGICAL: Strength and sensation symmetric and intact throughout.   SKIN: Warm and dry.  PSYCHIATRIC: AOx3. Normal affect and behaviour       LABS:                        8.5    7.75  )-----------( 176      ( 09 Sep 2019 21:51 )             26.0     Auto Eosinophil # x     / Auto Eosinophil % x     / Auto Neutrophil # x     / Auto Neutrophil % x     / BANDS % x                            8.0    8.33  )-----------( 161      ( 09 Sep 2019 03:00 )             23.9     Auto Eosinophil # 0.12  / Auto Eosinophil % 1.4   / Auto Neutrophil # 6.60  / Auto Neutrophil % 79.2  / BANDS % 1.7      09-09    138  |  97<L>  |  6<L>  ----------------------------<  107<H>  3.3<L>   |  23  |  0.46<L>    Ca    8.6      09 Sep 2019 03:00  Mg     1.6     09-09  Phos  2.7     09-09  TPro  6.0  /  Alb  3.3  /  TBili  16.9<H>  /  DBili  10.1<H>  /  AST  10  /  ALT  22  /  AlkPhos  111  09-09    PT/INR - ( 09 Sep 2019 03:00 )   PT: 16.0 SEC;   INR: 1.39          PTT - ( 09 Sep 2019 03:00 )  PTT:29.5 SEC              RADIOLOGY & ADDITIONAL TESTS:    Imaging Personally Reviewed:    Consultant(s) Notes Reviewed:      Care Discussed with Consultants/Other Providers:

## 2019-09-10 NOTE — PROGRESS NOTE ADULT - PROBLEM SELECTOR PLAN 5
-Normocytic anemia  -Hgb 9.1 on admission now downtrending, likely secondary to hemolysis (see w/o above)  -Hematology consulted; recs elective splenectomy following resolution of active infection  -c/w home folic acid  -Maintain active type and screen  -Transfuse Hgb < 7

## 2019-09-10 NOTE — PROGRESS NOTE ADULT - PROBLEM SELECTOR PLAN 3
-Pt met sepsis criteria on admission w/ tachycardia to 106 and leukocytosis to 23.28. Likely 2/2 acute cholecystitis/ pancreatitis  -Leukocytosis downtrended  -Pt persistently tachycardic, though EKG showing sinus tachycardia  -c/w zosyn 09/05-  -Blood cultures NGTD   -UA negative, F/u urine culture   -c/w gentle hydration  -Trend CBCs -Recent admission 07/2019 for acute cholecystitis and gallstone pancreatitis  Underwent ERCP w/ sphincterotomy and stent placement, w/ balloon sweep and removal of multiple dark stones.  Cholangiogram sig for mild CHD stricture but no fistula. 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  -AB U/S on this admission sig for acute cholecystitis   -MRCP 9/6 sig for acute cholecystitis w/ Mirrizzi syndrome. And possible pancreatitis  -S/p IR Perc quinyc tube placement 9/6 w/ 100cc purulent fluid drained  -Pt to get outpt lap quincy per surgery  -Wound culture sig for klebsiella, enterobacter. streptococcus  -can c/w zosyn (09/05-  -F/u sensitivities  -c/w tylenol PRN  -c/w morphine 4mg IV q6 PRN

## 2019-09-11 ENCOUNTER — TRANSCRIPTION ENCOUNTER (OUTPATIENT)
Age: 39
End: 2019-09-11

## 2019-09-11 LAB
ALBUMIN SERPL ELPH-MCNC: 3.3 G/DL — SIGNIFICANT CHANGE UP (ref 3.3–5)
ALP SERPL-CCNC: 171 U/L — HIGH (ref 40–120)
ALT FLD-CCNC: 46 U/L — HIGH (ref 4–33)
ANION GAP SERPL CALC-SCNC: 12 MMO/L — SIGNIFICANT CHANGE UP (ref 7–14)
AST SERPL-CCNC: 48 U/L — HIGH (ref 4–32)
BILIRUB DIRECT SERPL-MCNC: 5.6 MG/DL — HIGH (ref 0.1–0.2)
BILIRUB SERPL-MCNC: 11.5 MG/DL — HIGH (ref 0.2–1.2)
BUN SERPL-MCNC: 4 MG/DL — LOW (ref 7–23)
CALCIUM SERPL-MCNC: 9.1 MG/DL — SIGNIFICANT CHANGE UP (ref 8.4–10.5)
CHLORIDE SERPL-SCNC: 100 MMOL/L — SIGNIFICANT CHANGE UP (ref 98–107)
CO2 SERPL-SCNC: 26 MMOL/L — SIGNIFICANT CHANGE UP (ref 22–31)
CREAT SERPL-MCNC: 0.53 MG/DL — SIGNIFICANT CHANGE UP (ref 0.5–1.3)
GLUCOSE SERPL-MCNC: 95 MG/DL — SIGNIFICANT CHANGE UP (ref 70–99)
HCT VFR BLD CALC: 27.3 % — LOW (ref 34.5–45)
HGB BLD-MCNC: 8.8 G/DL — LOW (ref 11.5–15.5)
MAGNESIUM SERPL-MCNC: 1.7 MG/DL — SIGNIFICANT CHANGE UP (ref 1.6–2.6)
MCHC RBC-ENTMCNC: 26.5 PG — LOW (ref 27–34)
MCHC RBC-ENTMCNC: 32.2 % — SIGNIFICANT CHANGE UP (ref 32–36)
MCV RBC AUTO: 82.2 FL — SIGNIFICANT CHANGE UP (ref 80–100)
NRBC # FLD: 0.11 K/UL — SIGNIFICANT CHANGE UP (ref 0–0)
NRBC FLD-RTO: 1.5 — SIGNIFICANT CHANGE UP
PHOSPHATE SERPL-MCNC: 3.5 MG/DL — SIGNIFICANT CHANGE UP (ref 2.5–4.5)
PLATELET # BLD AUTO: 165 K/UL — SIGNIFICANT CHANGE UP (ref 150–400)
PMV BLD: 10.5 FL — SIGNIFICANT CHANGE UP (ref 7–13)
POTASSIUM SERPL-MCNC: 3.3 MMOL/L — LOW (ref 3.5–5.3)
POTASSIUM SERPL-SCNC: 3.3 MMOL/L — LOW (ref 3.5–5.3)
PROT SERPL-MCNC: 6.4 G/DL — SIGNIFICANT CHANGE UP (ref 6–8.3)
RBC # BLD: 3.32 M/UL — LOW (ref 3.8–5.2)
RBC # FLD: 21 % — HIGH (ref 10.3–14.5)
SODIUM SERPL-SCNC: 138 MMOL/L — SIGNIFICANT CHANGE UP (ref 135–145)
WBC # BLD: 7.35 K/UL — SIGNIFICANT CHANGE UP (ref 3.8–10.5)
WBC # FLD AUTO: 7.35 K/UL — SIGNIFICANT CHANGE UP (ref 3.8–10.5)

## 2019-09-11 PROCEDURE — 99223 1ST HOSP IP/OBS HIGH 75: CPT

## 2019-09-11 PROCEDURE — 99233 SBSQ HOSP IP/OBS HIGH 50: CPT

## 2019-09-11 RX ORDER — ACETAMINOPHEN 500 MG
2 TABLET ORAL
Qty: 0 | Refills: 0 | DISCHARGE
Start: 2019-09-11

## 2019-09-11 RX ORDER — PANTOPRAZOLE SODIUM 20 MG/1
1 TABLET, DELAYED RELEASE ORAL
Qty: 60 | Refills: 0
Start: 2019-09-11 | End: 2019-10-10

## 2019-09-11 RX ORDER — CIPROFLOXACIN LACTATE 400MG/40ML
1 VIAL (ML) INTRAVENOUS
Qty: 7 | Refills: 0
Start: 2019-09-11 | End: 2019-09-17

## 2019-09-11 RX ORDER — POTASSIUM CHLORIDE 20 MEQ
10 PACKET (EA) ORAL
Refills: 0 | Status: COMPLETED | OUTPATIENT
Start: 2019-09-11 | End: 2019-09-11

## 2019-09-11 RX ADMIN — PIPERACILLIN AND TAZOBACTAM 25 GRAM(S): 4; .5 INJECTION, POWDER, LYOPHILIZED, FOR SOLUTION INTRAVENOUS at 08:55

## 2019-09-11 RX ADMIN — PANTOPRAZOLE SODIUM 40 MILLIGRAM(S): 20 TABLET, DELAYED RELEASE ORAL at 13:02

## 2019-09-11 RX ADMIN — PIPERACILLIN AND TAZOBACTAM 25 GRAM(S): 4; .5 INJECTION, POWDER, LYOPHILIZED, FOR SOLUTION INTRAVENOUS at 13:51

## 2019-09-11 RX ADMIN — Medication 100 MILLIEQUIVALENT(S): at 13:51

## 2019-09-11 RX ADMIN — Medication 100 MILLIEQUIVALENT(S): at 11:40

## 2019-09-11 RX ADMIN — Medication 100 MILLIEQUIVALENT(S): at 10:19

## 2019-09-11 RX ADMIN — Medication 1 MILLIGRAM(S): at 13:02

## 2019-09-11 RX ADMIN — PIPERACILLIN AND TAZOBACTAM 25 GRAM(S): 4; .5 INJECTION, POWDER, LYOPHILIZED, FOR SOLUTION INTRAVENOUS at 21:48

## 2019-09-11 NOTE — PROGRESS NOTE ADULT - PROBLEM SELECTOR PLAN 3
-Recent admission 07/2019 for acute cholecystitis and gallstone pancreatitis  Underwent ERCP w/ sphincterotomy and stent placement, w/ balloon sweep and removal of multiple dark stones.  Cholangiogram sig for mild CHD stricture but no fistula. 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  -AB U/S on this admission sig for acute cholecystitis   -MRCP 9/6 sig for acute cholecystitis w/ Mirrizzi syndrome. And possible pancreatitis  -S/p IR Perc quincy tube placement 9/6 w/ 100cc purulent fluid drained  -Pt to get outpt lap quincy per surgery  -Wound culture sig for klebsiella, enterobacter. streptococcus  -can c/w zosyn (09/05-  -F/u sensitivities  -c/w tylenol PRN  -c/w morphine 4mg IV q6 PRN -Recent admission 07/2019 for acute cholecystitis and gallstone pancreatitis  Underwent ERCP w/ sphincterotomy and stent placement, w/ balloon sweep and removal of multiple dark stones.  Cholangiogram sig for mild CHD stricture but no fistula. 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  -AB U/S on this admission sig for acute cholecystitis   -MRCP 9/6 sig for acute cholecystitis w/ Mirrizzi syndrome. And possible pancreatitis  -S/p IR Perc quincy tube placement 9/6 w/ 100cc purulent fluid drained  -Pt to get outpt lap quincy per surgery  -Wound culture sig for klebsiella, enterobacter. streptococcus  -can c/w zosyn (09/05-  as klebsiella/enterobacter sensitive  -May need to start ceftriaxone or azithro for strep anginous  -ID consulted for dispo antibiotic recs  -c/w tylenol PRN  -c/w morphine 4mg IV q6 PRN -Recent admission 07/2019 for acute cholecystitis and gallstone pancreatitis  Underwent ERCP w/ sphincterotomy and stent placement, w/ balloon sweep and removal of multiple dark stones. Cholangiogram sig for mild CHD stricture but no fistula. 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  -AB U/S on this admission sig for acute cholecystitis   -MRCP 9/6 sig for acute cholecystitis w/ Mirrizzi syndrome. And possible pancreatitis  -S/p IR Perc quincy tube placement 9/6 w/ 100cc purulent fluid drained. Spoke w/ IR about drain and drain and pt should f/u as outpt.   -Pt to get outpt lap quincy per surgery  -Wound culture sig for klebsiella, enterobacter. streptococcus  -can c/w zosyn (09/05-    -ID consulted for dispo antibiotic recs  -c/w tylenol PRN  -c/w morphine 4mg IV q6 PRN

## 2019-09-11 NOTE — PHYSICAL THERAPY INITIAL EVALUATION ADULT - PERTINENT HX OF CURRENT PROBLEM, REHAB EVAL
39 year old female hx of hereditary spherocytosis, Mirizzi syndrome, gallstone pancreatitis, cholecystitis presenting w/ progressively worsening abdominal pain x 1 week, admitted for acute cholecystitis. S/p IR Perc quincy tube placement 9/6.

## 2019-09-11 NOTE — DISCHARGE NOTE PROVIDER - NSDCFUADDAPPT_GEN_ALL_CORE_FT
Hematology follow up:   Interventional radiology: [phone]. Please call 1-2 weeks following your discharge for further management of the cholecystostomy tube   GI follow up: GI clinic [phone] .  Please call 1-2 weeks following your discharge for further management Hematology follow up: You should receive an email from in-house hematology regarding further management  Interventional radiology: [phone]. Please call 1-2 weeks following your discharge for further management of the cholecystostomy tube   GI follow up: GI clinic [phone] .  Please call 1-2 weeks following your discharge for further management Surgery Follow up: You have been scheduled to follow up with Dr. Yeung on 9/18/19 at 2PM  Hematology follow up: You should receive an email from in-house hematology regarding further management  Interventional radiology: [phone]. Please call if you need bag replacement for any reason, if the tube becomes clogged/not draining. Or if area around tube looks infected  GI follow up: GI clinic [phone] .  Please call 1-2 weeks following your discharge for further management

## 2019-09-11 NOTE — DISCHARGE NOTE PROVIDER - NSDCCPTREATMENT_GEN_ALL_CORE_FT
PRINCIPAL PROCEDURE  Procedure: Percutaneous cholecystotomy for drainage  Findings and Treatment: You presented to the hospital with abdominal pain and was found to have an infection in your gallbladder. Interventional radiology was consulted and a tube was placed into your gallbladder to help control your infection. The tube should be left in place until your gallbladder removal. Please follow up with the interventional radiology clinic at [phone] 1-2 weeks following your discharge for further management of the tube. PRINCIPAL PROCEDURE  Procedure: Percutaneous cholecystotomy for drainage  Findings and Treatment: You presented to the hospital with abdominal pain and was found to have an infection in your gallbladder. Interventional radiology was consulted and a tube was placed into your gallbladder to help control your infection. The tube should be left in place until your gallbladder removal. Continue routine care for tubePlease follow up with the interventional radiology clinic at [phone] 1-2 weeks following your discharge for further management of the tube. PRINCIPAL PROCEDURE  Procedure: Percutaneous cholecystotomy for drainage  Findings and Treatment: You presented to the hospital with abdominal pain and was found to have an infection in your gallbladder. Interventional radiology was consulted and a tube was placed into your gallbladder to help control your infection. The tube should be left in place until your gallbladder removal. Continue routine care for tube. Keep area around tube clean and dry. Bag does not need to be changed. If bag replacement needed for any reaosn please call the interventional radiology clinic at [phone]. If tube becomes clogged/stops draining please call the interventional radiology clinic.

## 2019-09-11 NOTE — PROGRESS NOTE ADULT - PROBLEM SELECTOR PLAN 2
-Most likely 2/2 hemolysis from HS in setting of  infection  -H/H decreased but stable  -Maintain Active T&S  -transfuse >7 Hgb  -trend LDH, bilirubin (direct and indirect), haptoglobin daily for now  -f/u heme recs

## 2019-09-11 NOTE — DISCHARGE NOTE PROVIDER - CARE PROVIDER_API CALL
Chadd Yeung)  Surgery; Surgical Critical Care  [address], [address]  [place] [address]  Phone: [phone]  Fax: [phone]  Follow Up Time: Chadd Yeung)  Surgery; Surgical Critical Care  [address], [address]  [place] [address]  Phone: [phone]  Fax: [phone]  Follow Up Time: 1 week

## 2019-09-11 NOTE — PROGRESS NOTE ADULT - PROBLEM SELECTOR PLAN 4
-Pt met sepsis criteria on admission w/ tachycardia to 106 and leukocytosis to 23.28. Likely 2/2 acute cholecystitis/ pancreatitis  -Leukocytosis downtrended  -Pt persistently tachycardic, though EKG showing sinus tachycardia  -c/w zosyn 09/05-  -Blood cultures NGTD   -UA negative, urine culture neg  -encourage PO hydration  -Trend CBCs -Pt met sepsis criteria on admission w/ tachycardia to 106 and leukocytosis to 23.28. Likely 2/2 acute cholecystitis/ pancreatitis  -Leukocytosis downtrended  -Pt persistently tachycardic, though EKG showing sinus tachycardia  -c/w zosyn 09/05-  -Blood cultures NGTD   -UA negative, urine culture neg  -encourage PO hydration  -Trend CBCs  -treatment of acute cholecystitis as stated above

## 2019-09-11 NOTE — PROGRESS NOTE ADULT - ASSESSMENT
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 S/p IR Perc quincy tube placement 9/6.

## 2019-09-11 NOTE — DISCHARGE NOTE NURSING/CASE MANAGEMENT/SOCIAL WORK - NSSCNAMETXT_GEN_ALL_CORE
[facility]. Nurse to visit on the day following discharge. Other appropriate services to be arranged thereafter.   Please contact the home care agency at the above phone number if you have not heard from them by approximately 12 noon on the day after your hospital discharge.

## 2019-09-11 NOTE — CONSULT NOTE ADULT - ASSESSMENT
ASSESSMENT:    RECOMMENDATIONS:    SAMUEL Merida, MD  Fellow, Infectious Diseases  Pager: [phone]  After 5pm and on Weekends: Call [phone] ASSESSMENT:  39/F with PMH hereditary spherocytosis (followed by PCP - not hematology), Mirizzi syndrome, gallstone pancreatitis, cholecystitis presenting w/ progressively worsening abdominal pain x 1 week, bilious vomiting. Recent admission 07/2019 for acute cholecystitis and gallstone pancreatitis. Patient underwent ERCP w/  sphincterotomy and stent placement, w/ balloon sweep and removal of multiple dark stones.  Cholangiogram sig for mild CHD stricture but no fistula. 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  S/p cholecystostomy tube placed 9/6 by IR. Biliary drain cultures growing Klebsiella pneumoniae, Enterobacter cloacae and Streptococcus anginosus. 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 Additionally patient has an IUD    RECOMMENDATIONS:  - can discontinue Zosyn  - start Augmentin -125 BID and Levofloxacin 750mg/d PO  - if patient being discharged, she can continue antibiotics for an additional 7 days  - patient advised that on follow up with surgeons, discussion should be held regarding pre-splenectomy vaccines if splenectomy is planned at the time of cholecystectomy  Recs conveyed to primary team    SAMUEL Merida, MD  Fellow, Infectious Diseases  Pager: [phone]  After 5pm and on Weekends: Call [phone]

## 2019-09-11 NOTE — PROGRESS NOTE ADULT - PROBLEM SELECTOR PLAN 5
-Normocytic anemia  -Hgb 9.1 on admission now downtrending, likely secondary to hemolysis (see w/o above)  -Hematology consulted; recs elective splenectomy following resolution of active infection  -c/w home folic acid  -Maintain active type and screen  -Transfuse Hgb < 7 -Normocytic anemia  -Hgb 9.1 on admission, likely secondary to hemolysis (see w/o above)  -Hematology consulted; recs elective splenectomy following resolution of active infection  -c/w home folic acid  -Maintain active type and screen  -Transfuse Hgb < 7

## 2019-09-11 NOTE — DISCHARGE NOTE PROVIDER - HOSPITAL COURSE
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 Per patient, pain is sharp, 10/10 in severity and located in the right upper quadrant w/ radiation to the epigastric region and back. ROS positive for nausea w/ associated vomiting ("green w/ mucus"), decreased appetite and fatigue. Pt denies fevers, chills, CP, SOB, const/diarrhea, urinary changes, weight loss, skin rashes/lesions, recent travel sick contact. Of note patient was recently admitted at this facility 07/2019 for acute cholecystitis and gallstone pancreatitis. Patient underwent ERCP w/  sphincterotomy and stent placement, w/ balloon sweep and removal of multiple dark stones.  Cholangiogram sig for mild CHD stricture but no fistula. 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        In the E.D vitals were sig for tachycardia to 106. Labs sig for WBC to 23.28. Elevated T bili to 23.5. Direct bili 1.6. Lipase 110.9. AB US sig for acute cholecystitis. Patient given 1L NS started on zosyn. MRCP was done 9/6 which was sig for acute cholecystitis w/ Mirrizzi syndrome. And possible pancreatitis. Pt underwent IR Perc quincy tube placement 9/6 w/ 100cc purulent fluid drained. Wound cultures sig for klebsiella, enterobacter, streptococcus. Hospital course complicated by BRBPR x 2-3 days. GI consulted and rec'd outpatient flexible sigmoidoscopy +/- colonoscopy for further evaluation. Patient will follow up with Dr Yeung outpatient for lap cholecystectomy as per surgery. 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 Per patient, pain is sharp, 10/10 in severity and located in the right upper quadrant w/ radiation to the epigastric region and back. ROS positive for nausea w/ associated vomiting ("green w/ mucus"), decreased appetite and fatigue. Pt denies fevers, chills, CP, SOB, const/diarrhea, urinary changes, weight loss, skin rashes/lesions, recent travel sick contact. Of note patient was recently admitted at this facility 07/2019 for acute cholecystitis and gallstone pancreatitis. Patient underwent ERCP w/  sphincterotomy and stent placement, w/ balloon sweep and removal of multiple dark stones.  Cholangiogram sig for mild CHD stricture but no fistula. 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        In the E.D vitals were sig for tachycardia to 106. Labs sig for WBC to 23.28. Elevated T bili to 23.5. Direct bili 1.6. Lipase 110.9. AB US sig for acute cholecystitis. Patient given 1L NS started on zosyn. MRCP was done 9/6 which was sig for acute cholecystitis w/ Mirrizzi syndrome. And possible pancreatitis. Pt underwent IR Perc quincy tube placement 9/6 w/ 100cc purulent fluid drained. Wound cultures sig for klebsiella, enterobacter, streptococcus. Pt pain was controlled and during the course her pain improved and she was able to tolerate a diet.  Hospital course complicated by BRBPR x 2-3 days. GI consulted and rec'd outpatient flexible sigmoidoscopy +/- colonoscopy for further evaluation. Patient will follow up with Dr Yeung outpatient for lap cholecystectomy as per surgery.

## 2019-09-11 NOTE — CONSULT NOTE ADULT - SUBJECTIVE AND OBJECTIVE BOX
Patient is a 39y old  Female who presents with a chief complaint of Cholecystitis (11 Sep 2019 06:52)    HPI:  38 Yo f hx of hereditary spherocytosis, Mirizzi syndrome, gallstone pancreatitis, cholecystitis presenting w/ progressively worsening abdominal pain x 1 week. Per patient, pain is sharp, 10/10 in severity and located in the right upper quadrant w/ radiation to the epigastric region and back. ROS positive for nausea w/ associated vomiting ("green w/ mucus"), decreased appetite and fatigue. Pt denies fevers, chills, CP, SOB, const/diarrhea, urinary changes, weight loss, skin rashes/lesions, recent travel sick contact. Of note patient was recently admitted at this facility 2019 for acute cholecystitis and gallstone pancreatitis. Patient underwent ERCP w/  sphincterotomy and stent placement, w/ balloon sweep and removal of multiple dark stones.  Cholangiogram sig for mild CHD stricture but no fistula. 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    In the E.D vitals were sig for tachycardia to 106. Labs sig for WBC to 23.28. Elevated T bili to 23.5. Direct bili 1.6. Lipase 110.9. AB US sig for acute cholecystitis. Patient given 1L NS started on zosyn. (05 Sep 2019 12:07)     prior hospital charts reviewed [  ]  primary team notes reviewed [  ]  other consultant notes reviewed [  ]  PAST MEDICAL & SURGICAL HISTORY:  IUP (intrauterine pregnancy), incidental: 16 weeks  Mucous polyp of cervix  Hereditary spherocytosis   history:     Allergies  No Known Allergies    ANTIMICROBIALS (past 90 days)  MEDICATIONS  (STANDING):  piperacillin/tazobactam IVPB.   200 mL/Hr IV Intermittent (19 @ 06:18)    piperacillin/tazobactam IVPB..   25 mL/Hr IV Intermittent (19 @ 08:55)   25 mL/Hr IV Intermittent (09-10-19 @ 22:12)   25 mL/Hr IV Intermittent (09-10-19 @ 14:01)   25 mL/Hr IV Intermittent (09-10-19 @ 06:27)   25 mL/Hr IV Intermittent (19 @ 22:11)   25 mL/Hr IV Intermittent (19 @ 13:02)   25 mL/Hr IV Intermittent (19 @ 06:17)   25 mL/Hr IV Intermittent (19 @ 22:17)   25 mL/Hr IV Intermittent (19 @ 14:29)   25 mL/Hr IV Intermittent (19 @ 06:20)   25 mL/Hr IV Intermittent (19 @ 22:19)   25 mL/Hr IV Intermittent (19 @ 15:27)   25 mL/Hr IV Intermittent (19 @ 07:00)   25 mL/Hr IV Intermittent (19 @ 21:54)   25 mL/Hr IV Intermittent (19 @ 13:49)   25 mL/Hr IV Intermittent (19 @ 06:13)   25 mL/Hr IV Intermittent (19 @ 21:19)   25 mL/Hr IV Intermittent (19 @ 13:39)      ANTIMICROBIALS:    piperacillin/tazobactam IVPB.. 3.375 every 8 hours    OTHER MEDS: MEDICATIONS  (STANDING):  acetaminophen   Tablet .. 650 every 6 hours PRN  morphine  - Injectable 4 every 6 hours PRN  ondansetron Injectable 4 every 8 hours PRN  pantoprazole  Injectable 40 daily    SOCIAL HISTORY:   hx smoking  non-smoker    FAMILY HISTORY:  Family history of hypertension  Family history of hereditary spherocytosis    REVIEW OF SYSTEMS  [  ] ROS unobtainable because:    [  ] All other systems negative except as noted below:	    Constitutional:  [ ] fever [ ] chills  [ ] weight loss  [ ] weakness  Skin:  [ ] rash [ ] phlebitis	  Eyes: [ ] icterus [ ] pain  [ ] discharge	  ENMT: [ ] sore throat  [ ] thrush [ ] ulcers [ ] exudates  Respiratory: [ ] dyspnea [ ] hemoptysis [ ] cough [ ] sputum	  Cardiovascular:  [ ] chest pain [ ] palpitations [ ] edema	  Gastrointestinal:  [ ] nausea [ ] vomiting [ ] diarrhea [ ] constipation [ ] pain	  Genitourinary:  [ ] dysuria [ ] frequency [ ] hematuria [ ] discharge [ ] flank pain  [ ] incontinence  Musculoskeletal:  [ ] myalgias [ ] arthralgias [ ] arthritis  [ ] back pain  Neurological:  [ ] headache [ ] seizures  [ ] confusion/altered mental status  Psychiatric:  [ ] anxiety [ ] depression	  Hematology/Lymphatics:  [ ] lymphadenopathy  Endocrine:  [ ] adrenal [ ] thyroid  Allergic/Immunologic:	 [ ] transplant [ ] seasonal    Vital Signs Last 24 Hrs  T(F): 99.1 (19 @ 05:43), Max: 100.5 (19 @ 13:43)  Vital Signs Last 24 Hrs  HR: 81 (19 @ 05:43) (81 - 92)  BP: 117/69 (19 @ 05:43) (107/68 - 117/69)  RR: 17 (19 @ 05:43)  SpO2: 98% (19 @ 05:43) (97% - 100%)  Wt(kg): --    EXAM:                          8.8    7.35  )-----------( 165      ( 11 Sep 2019 05:45 )             27.3         138  |  100  |  4<L>  ----------------------------<  95  3.3<L>   |  26  |  0.53    Ca    9.1      11 Sep 2019 05:45  Phos  3.5       Mg     1.7         TPro  6.4  /  Alb  3.3  /  TBili  11.5<H>  /  DBili  5.6<H>  /  AST  48<H>  /  ALT  46<H>  /  AlkPhos  171<H>      MICROBIOLOGY:  GI PCR Panel, Stool (collected 07 Sep 2019 22:03)  Source: FECES    Culture - Stool (collected 07 Sep 2019 22:03)  Source: [facility]ES  Final Report (10 Sep 2019 10:34):    ****************** PRELIMINARY **************************    NEGATIVE FOR SALMONELLA, SHIGELLA, YERSINIA,    E. COLI O157, AEROMONAS, PLESIOMONAS, AND VIBRIO SP.                        AFTER 24 HOURS    *********************************************************    **********************FINAL REPORT************************    CULTURE NEGATIVE FOR SALMONELLA, SHIGELLA, YERSINIA, E COLI    O157, AEROMONAS, PLESIOMONAS, CAMPYLOBACTER AND VIBRIO SP.    **********************************************************    Culture - Urine (collected 07 Sep 2019 20:39)  Source: URINE MIDSTREAM  Final Report (09 Sep 2019 09:10):    NO GROWTH AT 24 HOURS    Culture - Yeast and Fungus (collected 06 Sep 2019 21:23)  Source: DRAINAGE  Preliminary Report (10 Sep 2019 10:00):    CULTURE NEGATIVE FOR YEASTS AND MOLDS     AFTER 3 DAYS    Culture - Surg Site Aerob/Anaer w/Gm St (collected 06 Sep 2019 21:23)  Source: DRAINAGE  Final Report (09 Sep 2019 14:01):    MODERATE    RESULT CALLED TO / READ BACK: NOELLE NICHOLS/FRITZ    DATE / TIME CALLED: [id number]    CALLED BY: GUTIERREZ HO  Organism: Klebsiella pneumoniae  Enterobacter cloacae  Streptococcus anginosus (09 Sep 2019 14:01)  Organism: Streptococcus anginosus (09 Sep 2019 14:01)      -  Ceftriaxone: S <=1 JEWEL      -  Clindamycin: S <0.25 JEWEL      -  Erythromycin: S <=0.12 JEWEL      -  Penicillin G: S 0.12 JEWEL      -  Vancomycin: S 1 JEWEL      Method Type: MANUAL JEWEL  Organism: Enterobacter cloacae (09 Sep 2019 14:01)      -  Amikacin: S <=8 JEWEL      -  Ampicillin: R      -  Ampicillin/Sulbactam: R 16/8 JEWEL      -  Aztreonam: S <=4 JEWEL      -  Cefazolin: R >16 JEWEL      -  Cefepime: S <=2 JEWEL      -  Cefoxitin: R >16 JEWEL      -  Ceftazidime: S <=1 JEWEL      -  Ceftriaxone: S <=1 JEWEL      -  Ertapenem: S <=0.5 JEWEL      -  Gentamicin: S <=1 JEWEL      -  Imipenem: S <=1 JEWEL      -  Levofloxacin: S <=1 JEWEL      -  Meropenem: S <=1 JEWEL      -  Piperacillin/Tazobactam: S <=8 JEWEL      -  Tigecycline: S <=1 JEWEL      -  Tobramycin: S <=2 JEWEL      -  Trimethoprim/Sulfamethoxazole: S <=0.5/9.5 JEWEL      Method Type: NEGATIVE JEWEL 43  Organism: Klebsiella pneumoniae (09 Sep 2019 14:01)      -  Amikacin: S <=8 JEWEL      -  Ampicillin: R >16 JEWEL      -  Ampicillin/Sulbactam: I 16/8 JEWEL      -  Aztreonam: S <=4 JEWEL      -  Cefazolin: S <=2 JEWEL      -  Cefepime: S <=2 JEWEL      -  Cefoxitin: S <=4 JEWEL      -  Ceftazidime: S <=1 JEWEL      -  Ceftriaxone: S <=1 JEWEL      -  Ertapenem: S <=0.5 JEWEL      -  Gentamicin: S <=1 JEWEL      -  Imipenem: S <=1 JEWEL      -  Levofloxacin: S <=1 JEWEL      -  Meropenem: S <=1 JEWEL      -  Piperacillin/Tazobactam: S <=8 JEWEL      -  Tigecycline: S      -  Tobramycin: S <=2 JEWEL      -  Trimethoprim/Sulfamethoxazole: S <=0.5/9.5 JEWEL      Method Type: NEGATIVE JEWEL 43                    RADIOLOGY:  imaging below personally reviewed    OTHER TESTS: Patient is a 39y old  Female who presents with a chief complaint of Cholecystitis (11 Sep 2019 06:52)    HPI:  39F hereditary spherocytosis (followed by PCP - not hematology), Mirizzi syndrome, gallstone pancreatitis, cholecystitis presenting w/ progressively worsening abdominal pain x 1 week. Per patient, pain is sharp, 10/10 in severity and located in the right upper quadrant w/ radiation to the epigastric region and back. ROS positive for nausea w/ associated vomiting ("green w/ mucus"), decreased appetite and fatigue. Pt denies fevers, chills, CP, SOB, const/diarrhea, urinary changes, weight loss, skin rashes/lesions, recent travel sick contact. Of note patient was recently admitted at this facility 2019 for acute cholecystitis and gallstone pancreatitis. Patient underwent ERCP w/  sphincterotomy and stent placement, w/ balloon sweep and removal of multiple dark stones.  Cholangiogram sig for mild CHD stricture but no fistula. 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    In the E.D vitals were sig for tachycardia to 106. Labs sig for WBC to 23.28. Elevated T bili to 23.5. Direct bili 1.6. Lipase 110.9. AB US sig for acute cholecystitis. Patient given 1L NS started on zosyn. (05 Sep 2019 12:07)     prior hospital charts reviewed [x]  primary team notes reviewed [x  ]  other consultant notes reviewed [ x ]    PAST MEDICAL & SURGICAL HISTORY:  IUP (intrauterine pregnancy), incidental: 16 weeks  Mucous polyp of cervix  Hereditary spherocytosis   history:     Allergies  No Known Allergies    ANTIMICROBIALS:    piperacillin/tazobactam IVPB.. 3.375 every 8 hours (-)    OTHER MEDS: MEDICATIONS  (STANDING):  acetaminophen   Tablet .. 650 every 6 hours PRN  morphine  - Injectable 4 every 6 hours PRN  ondansetron Injectable 4 every 8 hours PRN  pantoprazole  Injectable 40 daily    SOCIAL HISTORY:      FAMILY HISTORY:  Family history of hypertension  Family history of hereditary spherocytosis (daughter)    REVIEW OF SYSTEMS  [  ] ROS unobtainable because:    [  ] All other systems negative except as noted below:	    Constitutional:  [ ] fever [ ] chills  [ ] weight loss  [ ] weakness  Skin:  [ ] rash [ ] phlebitis	  Eyes: [ ] icterus [ ] pain  [ ] discharge	  ENMT: [ ] sore throat  [ ] thrush [ ] ulcers [ ] exudates  Respiratory: [ ] dyspnea [ ] hemoptysis [ ] cough [ ] sputum	  Cardiovascular:  [ ] chest pain [ ] palpitations [ ] edema	  Gastrointestinal:  [ ] nausea [ ] vomiting [ ] diarrhea [ ] constipation [ ] pain	  Genitourinary:  [ ] dysuria [ ] frequency [ ] hematuria [ ] discharge [ ] flank pain  [ ] incontinence  Musculoskeletal:  [ ] myalgias [ ] arthralgias [ ] arthritis  [ ] back pain  Neurological:  [ ] headache [ ] seizures  [ ] confusion/altered mental status  Psychiatric:  [ ] anxiety [ ] depression	  Hematology/Lymphatics:  [ ] lymphadenopathy  Endocrine:  [ ] adrenal [ ] thyroid  Allergic/Immunologic:	 [ ] transplant [ ] seasonal    Vital Signs Last 24 Hrs  T(F): 99.1 (19 @ 05:43), Max: 100.5 (19 @ 13:43)  Vital Signs Last 24 Hrs  HR: 81 (19 @ 05:43) (81 - 92)  BP: 117/69 (19 @ 05:43) (107/68 - 117/69)  RR: 17 (19 @ 05:43)  SpO2: 98% (19 @ 05:43) (97% - 100%)  Wt(kg): --    PHYSICAL EXAM:  General: non-toxic  HEAD/EYES: icteric  ENT:  supple  Cardiovascular:   S1, S2  Respiratory:  clear bilaterally  GI:  soft, slightly tender at cholecystostomy tube exit site; purulent brownish drainage  :  no CVA tenderness   Musculoskeletal:  no synovitis  Neurologic:  grossly non-focal  Skin:  jaundiced mucous membranes  Psychiatric:  appropriate affect  Vascular:  no phlebitis    WBC Count: 7.35 (19 @ 05:45)  WBC Count: 23.28 (19 @ 04:30)    Bilirubin Total, Serum: 11.5 mg/dL (19 @ 05:45)  Bilirubin Total, Serum: 12.1 mg/dL (09-10-19 @ 05:20)  Bilirubin Total, Serum: 16.9 mg/dL (19 @ 03:00)  Bilirubin Total, Serum: 25.4 mg/dL (19 @ 06:15)  Bilirubin Total, Serum: 31.2 mg/dL (19 @ 06:00)  Bilirubin Total, Serum: 19.6 mg/dL (19 @ 07:35)  Bilirubin Total, Serum: 19.6 mg/dL (19 @ 07:35)  Bilirubin Total, Serum: 23.5 mg/dL (19 @ 04:30)                        8.8    7.35  )-----------( 165      ( 11 Sep 2019 05:45 )             27.3     138  |  100  |  4<L>  ----------------------------<  95  3.3<L>   |  26  |  0.53    Ca    9.1      11 Sep 2019 05:45  Phos  3.5       Mg     1.7         TPro  6.4  /  Alb  3.3  /  TBili  11.5<H>  /  DBili  5.6<H>  /  AST  48<H>  /  ALT  46<H>  /  AlkPhos  171<H>      MICROBIOLOGY:  GI PCR Panel, Stool (collected 07 Sep 2019 22:03)  Source: [facility]ES  negative    Culture - Stool (collected 07 Sep 2019 22:03)  Source: [facility]ES  Final Report (10 Sep 2019 10:34):   negative    Culture - Urine (collected 07 Sep 2019 20:39)  Source: URINE MIDSTREAM  Final Report (09 Sep 2019 09:10):    NO GROWTH AT 24 HOURS    Culture - Yeast and Fungus (collected 06 Sep 2019 21:23)  Source: DRAINAGE  Preliminary Report (10 Sep 2019 10:00):    CULTURE NEGATIVE FOR YEASTS AND MOLDS     AFTER 3 DAYS    Culture - Surg Site Aerob/Anaer w/Gm St (collected 06 Sep 2019 21:23)  Source: DRAINAGE  Final Report (09 Sep 2019 14:01):  Enterobacter cloacae  Streptococcus anginosus (09 Sep 2019 14:01)  Organism: Streptococcus anginosus (09 Sep 2019 14:01)      -  Ceftriaxone: S <=1 JEWEL      -  Clindamycin: S <0.25 JEWEL      -  Erythromycin: S <=0.12 JEWEL      -  Penicillin G: S 0.12 JEWEL      -  Vancomycin: S 1 JEWEL      Method Type: MANUAL JEWEL  Organism: Enterobacter cloacae (09 Sep 2019 14:01)      -  Amikacin: S <=8 JEWEL      -  Ampicillin: R      -  Ampicillin/Sulbactam: R 16/8 JEWEL      -  Aztreonam: S <=4 JEWEL      -  Cefazolin: R >16 JEWEL      -  Cefepime: S <=2 JEWEL      -  Cefoxitin: R >16 JEWEL      -  Ceftazidime: S <=1 JEWEL      -  Ceftriaxone: S <=1 JEWEL      -  Ertapenem: S <=0.5 JEWEL      -  Gentamicin: S <=1 JEWEL      -  Imipenem: S <=1 JEWEL      -  Levofloxacin: S <=1 JEWEL      -  Meropenem: S <=1 JEWEL      -  Piperacillin/Tazobactam: S <=8 JEWEL      -  Tigecycline: S <=1 JEWEL      -  Tobramycin: S <=2 JEWEL      -  Trimethoprim/Sulfamethoxazole: S <=0.5/9.5 JEWEL      Method Type: NEGATIVE JEWEL 43  Organism: Klebsiella pneumoniae (09 Sep 2019 14:01)      -  Amikacin: S <=8 JEWEL      -  Ampicillin: R >16 JEWEL      -  Ampicillin/Sulbactam: I 16/8 JEWEL      -  Aztreonam: S <=4 JEWEL      -  Cefazolin: S <=2 JEWEL      -  Cefepime: S <=2 JEWEL      -  Cefoxitin: S <=4 JEWEL      -  Ceftazidime: S <=1 JEWEL      -  Ceftriaxone: S <=1 JEWEL      -  Ertapenem: S <=0.5 JEWEL      -  Gentamicin: S <=1 JEWEL      -  Imipenem: S <=1 JEWEL      -  Levofloxacin: S <=1 JEWEL      -  Meropenem: S <=1 JEWEL      -  Piperacillin/Tazobactam: S <=8 JEWEL      -  Tigecycline: S      -  Tobramycin: S <=2 JEWEL      -  Trimethoprim/Sulfamethoxazole: S <=0.5/9.5 JEWEL    RADIOLOGY:   ERCP (19 @ 17:13) >  - Biliary access was obtained. A cholangiogram was suggestive of a mild CHD stricture. Sweeps with a balloon produced multiple small black stones.  - A stent was placed into the CHD. The appearance of the cholangiogram is suggestive a Mirrizzi syndrome. The stricture is mild and there is no obvious cholecystobiliary fistula. Patient is a 39y old  Female who presents with a chief complaint of Cholecystitis (11 Sep 2019 06:52)    HPI:  39F hereditary spherocytosis (followed by PCP - not hematology), Mirizzi syndrome, gallstone pancreatitis, cholecystitis presenting w/ progressively worsening abdominal pain x 1 week. Per patient, pain is sharp, 10/10 in severity and located in the right upper quadrant w/ radiation to the epigastric region and back. ROS positive for nausea w/ associated vomiting ("green w/ mucus"), decreased appetite and fatigue. Pt denies fevers, chills, CP, SOB, const/diarrhea, urinary changes, weight loss, skin rashes/lesions, recent travel sick contact. Of note patient was recently admitted at this facility 2019 for acute cholecystitis and gallstone pancreatitis. Patient underwent ERCP w/  sphincterotomy and stent placement, w/ balloon sweep and removal of multiple dark stones.  Cholangiogram sig for mild CHD stricture but no fistula. 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    In the E.D vitals were sig for tachycardia to 106. Labs sig for WBC to 23.28. Elevated T bili to 23.5. Direct bili 1.6. Lipase 110.9. AB US sig for acute cholecystitis. Patient given 1L NS started on zosyn. (05 Sep 2019 12:07)  -------------  - followed by GI. MRCP showed large stone at GB neck  - evaluated by Surgery who recommended cholecystostomy tube placement. Pt had cholecystostomy tube placed  by IR  - evaluated by Hem/Onc for the hereditary spherocytosis  - one episode of hematochezia and abdominal pain, with drop of Hb noted in this admission. Responded to 1 unit pRBC, GI recommended outpatient f/u for colonoscopy  - Biliary drain cultures growing Klebsiella pneumoniae, Enterobacter cloacae and Stretococcus anginosus  - ID team consulted for antibiotic recommendations  - pt reports decreased RUQ pain. Reports nausea and anorexia  - last dental cleaning done 2019     prior hospital charts reviewed [x]  primary team notes reviewed [x  ]  other consultant notes reviewed [ x ]    PAST MEDICAL & SURGICAL HISTORY:  IUP (intrauterine pregnancy), incidental: 16 weeks  Mucous polyp of cervix  Hereditary spherocytosis   history:     Allergies  No Known Allergies    ANTIMICROBIALS:    piperacillin/tazobactam IVPB.. 3.375 every 8 hours (-)    OTHER MEDS: MEDICATIONS  (STANDING):  acetaminophen   Tablet .. 650 every 6 hours PRN  morphine  - Injectable 4 every 6 hours PRN  ondansetron Injectable 4 every 8 hours PRN  pantoprazole  Injectable 40 daily    SOCIAL HISTORY:    - social drinker. Quit smoking >10 years back. Occ marijuana use  - has dog at home    FAMILY HISTORY:  Family history of hypertension  Family history of hereditary spherocytosis (daughter)    REVIEW OF SYSTEMS  [  ] ROS unobtainable because:    [x] All other systems negative except as noted below:	    Constitutional:  [x] fever [x] chills  [ ] weight loss  [x] weakness  Skin:  [ ] rash [ ] phlebitis	  Eyes: [ ] icterus [ ] pain  [ ] discharge	  ENMT: [ ] sore throat  [ ] thrush [ ] ulcers [ ] exudates  Respiratory: [ ] dyspnea [ ] hemoptysis [ ] cough [ ] sputum	  Cardiovascular:  [ ] chest pain [ ] palpitations [ ] edema	  Gastrointestinal:  [ ] nausea [x] vomiting [ ] diarrhea [ ] constipation [x] pain	  Genitourinary:  [ ] dysuria [ ] frequency [ ] hematuria [ ] discharge [ ] flank pain  [ ] incontinence  Musculoskeletal:  [ ] myalgias [ ] arthralgias [ ] arthritis  [ ] back pain  Neurological:  [ ] headache [ ] seizures  [ ] confusion/altered mental status  Psychiatric:  [ ] anxiety [ ] depression	  Hematology/Lymphatics:  [ ] lymphadenopathy  Endocrine:  [ ] adrenal [ ] thyroid  Allergic/Immunologic:	 [ ] transplant [ ] seasonal    Vital Signs Last 24 Hrs  T(F): 99.1 (19 @ 05:43), Max: 100.5 (19 @ 13:43)  Vital Signs Last 24 Hrs  HR: 81 (19 @ 05:43) (81 - 92)  BP: 117/69 (19 @ 05:43) (107/68 - 117/69)  RR: 17 (19 @ 05:43)  SpO2: 98% (19 @ 05:43) (97% - 100%)  Wt(kg): --    PHYSICAL EXAM:  General: non-toxic  HEAD/EYES: icteric. Pallor noted  ENT:  supple  Cardiovascular:   S1, S2 heard, tachycardic  Respiratory:  clear bilaterally  GI:  soft, slightly tender at cholecystostomy tube exit site; purulent brownish drainage  :  no CVA tenderness   Musculoskeletal:  no synovitis  Neurologic:  grossly non-focal  Skin:  jaundiced mucous membranes  Psychiatric:  appropriate affect  Vascular:  no phlebitis    WBC Count: 7.35 (19 @ 05:45)  WBC Count: 23.28 (19 @ 04:30)    Bilirubin Total, Serum: 11.5 mg/dL (19 @ 05:45)  Bilirubin Total, Serum: 12.1 mg/dL (09-10-19 @ 05:20)  Bilirubin Total, Serum: 16.9 mg/dL (19 @ 03:00)  Bilirubin Total, Serum: 25.4 mg/dL (19 @ 06:15)  Bilirubin Total, Serum: 31.2 mg/dL (19 @ 06:00)  Bilirubin Total, Serum: 19.6 mg/dL (19 @ 07:35)  Bilirubin Total, Serum: 19.6 mg/dL (19 @ 07:35)  Bilirubin Total, Serum: 23.5 mg/dL (19 @ 04:30)                        8.8    7.35  )-----------( 165      ( 11 Sep 2019 05:45 )             27.3     138  |  100  |  4<L>  ----------------------------<  95  3.3<L>   |  26  |  0.53    Ca    9.1      11 Sep 2019 05:45  Phos  3.5       Mg     1.7         TPro  6.4  /  Alb  3.3  /  TBili  11.5<H>  /  DBili  5.6<H>  /  AST  48<H>  /  ALT  46<H>  /  AlkPhos  171<H>      MICROBIOLOGY:  GI PCR Panel, Stool (collected 07 Sep 2019 22:03)  Source: FECES  negative    Culture - Stool (collected 07 Sep 2019 22:03)  Source: FECES  Final Report (10 Sep 2019 10:34):   negative    Culture - Urine (collected 07 Sep 2019 20:39)  Source: URINE MIDSTREAM  Final Report (09 Sep 2019 09:10):    NO GROWTH AT 24 HOURS    Culture - Yeast and Fungus (collected 06 Sep 2019 21:23)  Source: DRAINAGE  Preliminary Report (10 Sep 2019 10:00):    CULTURE NEGATIVE FOR YEASTS AND MOLDS     AFTER 3 DAYS    Culture - Surg Site Aerob/Anaer w/Gm St (collected 06 Sep 2019 21:23)  Source: DRAINAGE  Final Report (09 Sep 2019 14:01):  Enterobacter cloacae  Streptococcus anginosus (09 Sep 2019 14:01)  Organism: Streptococcus anginosus (09 Sep 2019 14:01)      -  Ceftriaxone: S <=1 JEWEL      -  Clindamycin: S <0.25 JEWEL      -  Erythromycin: S <=0.12 JEWEL      -  Penicillin G: S 0.12 JEWEL      -  Vancomycin: S 1 JEWEL      Method Type: MANUAL JEWEL  Organism: Enterobacter cloacae (09 Sep 2019 14:01)      -  Amikacin: S <=8 JEWEL      -  Ampicillin: R      -  Ampicillin/Sulbactam: R 16/8 JEWEL      -  Aztreonam: S <=4 JEWEL      -  Cefazolin: R >16 JEWEL      -  Cefepime: S <=2 JEWEL      -  Cefoxitin: R >16 JEWEL      -  Ceftazidime: S <=1 JEWEL      -  Ceftriaxone: S <=1 JEWEL      -  Ertapenem: S <=0.5 JEWEL      -  Gentamicin: S <=1 JEWEL      -  Imipenem: S <=1 JEWEL      -  Levofloxacin: S <=1 JEWEL      -  Meropenem: S <=1 JEWEL      -  Piperacillin/Tazobactam: S <=8 JEWEL      -  Tigecycline: S <=1 JEWEL      -  Tobramycin: S <=2 JEWEL      -  Trimethoprim/Sulfamethoxazole: S <=0.5/9.5 JEWEL      Method Type: NEGATIVE JEWEL 43  Organism: Klebsiella pneumoniae (09 Sep 2019 14:01)      -  Amikacin: S <=8 JEWEL      -  Ampicillin: R >16 JEWEL      -  Ampicillin/Sulbactam: I 16/8 JEWEL      -  Aztreonam: S <=4 JEWEL      -  Cefazolin: S <=2 JEWEL      -  Cefepime: S <=2 JEWEL      -  Cefoxitin: S <=4 JEWEL      -  Ceftazidime: S <=1 JEWEL      -  Ceftriaxone: S <=1 JEWEL      -  Ertapenem: S <=0.5 JEWEL      -  Gentamicin: S <=1 JEWEL      -  Imipenem: S <=1 JEWEL      -  Levofloxacin: S <=1 JEWEL      -  Meropenem: S <=1 JEWEL      -  Piperacillin/Tazobactam: S <=8 JEWEL      -  Tigecycline: S      -  Tobramycin: S <=2 JEWEL      -  Trimethoprim/Sulfamethoxazole: S <=0.5/9.5 JEWEL    RADIOLOGY:   ERCP (19 @ 17:13) >  - Biliary access was obtained. A cholangiogram was suggestive of a mild CHD stricture. Sweeps with a balloon produced multiple small black stones.  - A stent was placed into the CHD. The appearance of the cholangiogram is suggestive a Mirrizzi syndrome. The stricture is mild and there is no obvious cholecystobiliary fistula.

## 2019-09-11 NOTE — DISCHARGE NOTE PROVIDER - NSDCCPCAREPLAN_GEN_ALL_CORE_FT
PRINCIPAL DISCHARGE DIAGNOSIS  Diagnosis: Acute cholecystitis  Assessment and Plan of Treatment: You presented to the E.D with worsening abdominal pain for a week. This pain was found to be due to an infection in your gallbladder. For this infection you were started on antibiotics. You also underwent a procedure during which a tube was placed in your gallbladder to drain out the infectious fluid. Please follow up with Dr Yeung 1 week following your discharge to schedule an appointment to further discuss planning a laproscopic cholecystectomy (gallbladder removal). Please continue to take your medications as recommended following your discharge.      SECONDARY DISCHARGE DIAGNOSES  Diagnosis: Hematochezia  Assessment and Plan of Treatment: You complained of blood "with wiping" after bowel movements. This might have been due to hemorrhoids or anal fissures. GI was consulted and are recommending that you follow up with them outpatient for a flexible sigmoidoscopy and/or colonoscopy for further evaluation.  Please follow up with the GI clinic [phone] 1-2 weeks following your discharge for further work up and management.      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 Hematology was consulted and have recommended elective splenectomy (spleen removal) following resolution of your active infection. Please follow up with the hematology clinic 1-2 weeks following your discharge for further discussions pertaining to management. PRINCIPAL DISCHARGE DIAGNOSIS  Diagnosis: Acute cholecystitis  Assessment and Plan of Treatment: You presented to the E.D with worsening abdominal pain for a week. This pain was found to be due to an infection in your gallbladder. For this infection you were started on antibiotics. You also underwent a procedure during which a tube was placed in your gallbladder to drain out the infectious fluid. Please follow up with Dr Yeung 1 week following your discharge to schedule an appointment to further discuss planning a laproscopic cholecystectomy (gallbladder removal). Please continue to take your medications as recommended following your discharge.      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 Hematology was consulted and have recommended elective splenectomy (spleen removal) following resolution of your active infection. Please follow up with the hematology clinic 1-2 weeks following your discharge for further discussions pertaining to management.    Diagnosis: Hematochezia  Assessment and Plan of Treatment: You complained of blood "with wiping" after bowel movements. This might have been due to hemorrhoids or anal fissures. GI was consulted and are recommending that you follow up with them outpatient for a flexible sigmoidoscopy and/or colonoscopy for further evaluation.  Please follow up with the GI clinic [phone] 1-2 weeks following your discharge for further work up and management.

## 2019-09-11 NOTE — DISCHARGE NOTE NURSING/CASE MANAGEMENT/SOCIAL WORK - NSDCPNINST_GEN_ALL_CORE
The Patient is stable for discharge, alert and oriented x 4; walks about freely and is self sufficient with her care.  She has the Right Biliary Drain to the RUQ for Acute Cholesystitis; she has no c/o nausea or vomiting at this time and she does tolerate a Clear Liquid diet.  She has no Pain , vital signs are stable and Instructions for going Home are given The Patient is stable for discharge, alert and oriented x 4; walks about freely and is self sufficient with her care.  She has the Right Biliary Drain to the RUQ for Acute Cholecystitis; she has no c/o nausea or vomiting at this time and she does tolerate a Clear Liquid diet.  She has no Pain , vital signs are stable and Instructions for going Home are given.  She did also receive her Antibiotics orally prior to being discharged.

## 2019-09-11 NOTE — PROGRESS NOTE ADULT - PROBLEM SELECTOR PLAN 6
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 Differential today consistent w/ indirect predominant   -s/p perc quincy tube placement 09/7 still draining   -F/u I/Os   -Will need elective splenectomy per Heme  -c/w GB workup (see above)

## 2019-09-11 NOTE — PROGRESS NOTE ADULT - SUBJECTIVE AND OBJECTIVE BOX
Skye Harrington MD  PGY 1 Department of Internal Medicine  Pager: [phone]  39y old  Female who presents with a chief complaint of Cholecystitis       SUBJECTIVE / OVERNIGHT EVENTS: Pt seen and examined. No acute overnight events.    [  ]Fever [  ]Chills  [  ]Nausea  [  ]Vomiting  [  ]Chest Pain [  ]SOB  [  ]Palpitations  [  ]Abdominal Pain  [  ]Numbness/tingling  [   ]Diarrhea   [   ]Constipation   [  ]New joint aches     MEDICATIONS  (STANDING):  dextrose 5% + sodium chloride 0.45%. 1000 milliLiter(s) (100 mL/Hr) IV Continuous <Continuous>  folic acid 1 milliGRAM(s) Oral daily  pantoprazole  Injectable 40 milliGRAM(s) IV Push daily  piperacillin/tazobactam IVPB.. 3.375 Gram(s) IV Intermittent every 8 hours    MEDICATIONS  (PRN):  acetaminophen   Tablet .. 650 milliGRAM(s) Oral every 6 hours PRN Temp greater or equal to 38C (100.4F), Mild Pain (1 - 3), Moderate Pain (4 - 6)  morphine  - Injectable 4 milliGRAM(s) IV Push every 6 hours PRN Severe Pain (7 - 10)  ondansetron Injectable 4 milliGRAM(s) IV Push every 8 hours PRN Nausea and/or Vomiting      I&O's Summary    09 Sep 2019 07:01  -  10 Sep 2019 07:00  --------------------------------------------------------  IN: 0 mL / OUT: 130 mL / NET: -130 mL    10 Sep 2019 07:01  -  11 Sep 2019 06:53  --------------------------------------------------------  IN: 720 mL / OUT: 570 mL / NET: 150 mL        Vital Signs Last 24 Hrs  T(C): 37.3 (11 Sep 2019 05:43), Max: 37.3 (11 Sep 2019 05:43)  T(F): 99.1 (11 Sep 2019 05:43), Max: 99.1 (11 Sep 2019 05:43)  HR: 81 (11 Sep 2019 05:43) (81 - 92)  BP: 117/69 (11 Sep 2019 05:43) (107/68 - 117/69)  BP(mean): --  RR: 17 (11 Sep 2019 05:43) (16 - 17)  SpO2: 98% (11 Sep 2019 05:43) (97% - 100%)    CAPILLARY BLOOD GLUCOSE          PHYSICAL EXAM:  GENERAL APPEARANCE: NAD, lethargic  HEENT:   EOMI. conjunctiva clear  NECK: Neck supple, non-tender without lymphadenopathy  CARDIAC: RRR, S1S2 normal, no murmurs, rubs or gallops  LUNGS: CTAB, no wheezing, no rales  ABDOMEN: severe TTP RUQ,  + guarding. No rebound. + Bowel sounds  MUSCULOSKELETAL: ROM intact  EXTREMITIES: No significant deformity or joint abnormality. No edema. Peripheral pulses intact.   NEUROLOGICAL: Strength and sensation symmetric and intact throughout.   SKIN: Warm and dry.  PSYCHIATRIC: AOx3. Normal affect and behaviour       LABS:                        8.8    7.35  )-----------( 165      ( 11 Sep 2019 05:45 )             27.3     Auto Eosinophil # x     / Auto Eosinophil % x     / Auto Neutrophil # x     / Auto Neutrophil % x     / BANDS % x                            8.1    7.14  )-----------( 182      ( 10 Sep 2019 05:20 )             24.4     Auto Eosinophil # 0.15  / Auto Eosinophil % 2.1   / Auto Neutrophil # 5.15  / Auto Neutrophil % 72.2  / BANDS % x                            8.5    7.75  )-----------( 176      ( 09 Sep 2019 21:51 )             26.0     Auto Eosinophil # x     / Auto Eosinophil % x     / Auto Neutrophil # x     / Auto Neutrophil % x     / BANDS % x        09-11    138  |  100  |  4<L>  ----------------------------<  95  3.3<L>   |  26  |  0.53  09-10    138  |  99  |  5<L>  ----------------------------<  91  3.0<L>   |  26  |  0.54    Ca    9.1      11 Sep 2019 05:45  Mg     1.7     09-11  Phos  3.5     09-11  TPro  6.4  /  Alb  3.3  /  TBili  11.5<H>  /  DBili  5.6<H>  /  AST  48<H>  /  ALT  46<H>  /  AlkPhos  171<H>  09-11  TPro  6.2  /  Alb  3.4  /  TBili  12.1<H>  /  DBili  6.4<H>  /  AST  27  /  ALT  31  /  AlkPhos  160<H>  09-10                  RADIOLOGY & ADDITIONAL TESTS:    Imaging Personally Reviewed:    Consultant(s) Notes Reviewed:      Care Discussed with Consultants/Other Providers: caffeine Skye Harrington MD  PGY 1 Department of Internal Medicine  Pager: [phone]  39y old  Female who presents with a chief complaint of Cholecystitis       SUBJECTIVE / OVERNIGHT EVENTS: Pt seen and examined. No acute overnight events. This AM reports no BM since yesterday; cannot endorse/deny further blood w/ wiping. Also endorses abdominal pain. ROS otherwise negative.    ROS:    [  ]Fever [  ]Chills  [  ]Nausea  [  ]Vomiting  [  ]Chest Pain [  ]SOB  [  ]Palpitations  [ * ]Abdominal Pain  [  ]Numbness/tingling  [   ]Diarrhea   [   ]Constipation   [  ]New joint aches     MEDICATIONS  (STANDING):  dextrose 5% + sodium chloride 0.45%. 1000 milliLiter(s) (100 mL/Hr) IV Continuous <Continuous>  folic acid 1 milliGRAM(s) Oral daily  pantoprazole  Injectable 40 milliGRAM(s) IV Push daily  piperacillin/tazobactam IVPB.. 3.375 Gram(s) IV Intermittent every 8 hours    MEDICATIONS  (PRN):  acetaminophen   Tablet .. 650 milliGRAM(s) Oral every 6 hours PRN Temp greater or equal to 38C (100.4F), Mild Pain (1 - 3), Moderate Pain (4 - 6)  morphine  - Injectable 4 milliGRAM(s) IV Push every 6 hours PRN Severe Pain (7 - 10)  ondansetron Injectable 4 milliGRAM(s) IV Push every 8 hours PRN Nausea and/or Vomiting      I&O's Summary    09 Sep 2019 07:01  -  10 Sep 2019 07:00  --------------------------------------------------------  IN: 0 mL / OUT: 130 mL / NET: -130 mL    10 Sep 2019 07:01  -  11 Sep 2019 06:53  --------------------------------------------------------  IN: 720 mL / OUT: 570 mL / NET: 150 mL        Vital Signs Last 24 Hrs  T(C): 37.3 (11 Sep 2019 05:43), Max: 37.3 (11 Sep 2019 05:43)  T(F): 99.1 (11 Sep 2019 05:43), Max: 99.1 (11 Sep 2019 05:43)  HR: 81 (11 Sep 2019 05:43) (81 - 92)  BP: 117/69 (11 Sep 2019 05:43) (107/68 - 117/69)  BP(mean): --  RR: 17 (11 Sep 2019 05:43) (16 - 17)  SpO2: 98% (11 Sep 2019 05:43) (97% - 100%)    CAPILLARY BLOOD GLUCOSE          PHYSICAL EXAM:  GENERAL APPEARANCE: NAD, lethargic  HEENT:   EOMI. conjunctiva clear  NECK: Neck supple, non-tender without lymphadenopathy  CARDIAC: RRR, S1S2 normal, no murmurs, rubs or gallops  LUNGS: CTAB, no wheezing, no rales  ABDOMEN: severe TTP RUQ,  + guarding. No rebound. + Bowel sounds. +quincy tube in place draining brownish red fluid  MUSCULOSKELETAL: ROM intact  EXTREMITIES: No significant deformity or joint abnormality. No edema. Peripheral pulses intact.   NEUROLOGICAL: Strength and sensation symmetric and intact throughout.   SKIN: Warm and dry.  PSYCHIATRIC: AOx3. Normal affect and behaviour       LABS:                        8.8    7.35  )-----------( 165      ( 11 Sep 2019 05:45 )             27.3     Auto Eosinophil # x     / Auto Eosinophil % x     / Auto Neutrophil # x     / Auto Neutrophil % x     / BANDS % x                            8.1    7.14  )-----------( 182      ( 10 Sep 2019 05:20 )             24.4     Auto Eosinophil # 0.15  / Auto Eosinophil % 2.1   / Auto Neutrophil # 5.15  / Auto Neutrophil % 72.2  / BANDS % x                            8.5    7.75  )-----------( 176      ( 09 Sep 2019 21:51 )             26.0     Auto Eosinophil # x     / Auto Eosinophil % x     / Auto Neutrophil # x     / Auto Neutrophil % x     / BANDS % x        09-11    138  |  100  |  4<L>  ----------------------------<  95  3.3<L>   |  26  |  0.53  09-10    138  |  99  |  5<L>  ----------------------------<  91  3.0<L>   |  26  |  0.54    Ca    9.1      11 Sep 2019 05:45  Mg     1.7     09-11  Phos  3.5     09-11  TPro  6.4  /  Alb  3.3  /  TBili  11.5<H>  /  DBili  5.6<H>  /  AST  48<H>  /  ALT  46<H>  /  AlkPhos  171<H>  09-11  TPro  6.2  /  Alb  3.4  /  TBili  12.1<H>  /  DBili  6.4<H>  /  AST  27  /  ALT  31  /  AlkPhos  160<H>  09-10                  RADIOLOGY & ADDITIONAL TESTS:    Imaging Personally Reviewed:    Consultant(s) Notes Reviewed:      Care Discussed with Consultants/Other Providers: Skye Harrington MD  PGY 1 Department of Internal Medicine  Pager: [phone]  39y old  Female who presents with a chief complaint of Cholecystitis       SUBJECTIVE / OVERNIGHT EVENTS: Pt seen and examined. No acute overnight events. This AM reports no BM since yesterday; cannot endorse/deny further blood w/ wiping. Also endorses abdominal pain. ROS otherwise negative.    MEDICATIONS  (STANDING):  dextrose 5% + sodium chloride 0.45%. 1000 milliLiter(s) (100 mL/Hr) IV Continuous <Continuous>  folic acid 1 milliGRAM(s) Oral daily  pantoprazole  Injectable 40 milliGRAM(s) IV Push daily  piperacillin/tazobactam IVPB.. 3.375 Gram(s) IV Intermittent every 8 hours    MEDICATIONS  (PRN):  acetaminophen   Tablet .. 650 milliGRAM(s) Oral every 6 hours PRN Temp greater or equal to 38C (100.4F), Mild Pain (1 - 3), Moderate Pain (4 - 6)  morphine  - Injectable 4 milliGRAM(s) IV Push every 6 hours PRN Severe Pain (7 - 10)  ondansetron Injectable 4 milliGRAM(s) IV Push every 8 hours PRN Nausea and/or Vomiting      I&O's Summary    09 Sep 2019 07:01  -  10 Sep 2019 07:00  --------------------------------------------------------  IN: 0 mL / OUT: 130 mL / NET: -130 mL    10 Sep 2019 07:01  -  11 Sep 2019 06:53  --------------------------------------------------------  IN: 720 mL / OUT: 570 mL / NET: 150 mL        Vital Signs Last 24 Hrs  T(C): 37.3 (11 Sep 2019 05:43), Max: 37.3 (11 Sep 2019 05:43)  T(F): 99.1 (11 Sep 2019 05:43), Max: 99.1 (11 Sep 2019 05:43)  HR: 81 (11 Sep 2019 05:43) (81 - 92)  BP: 117/69 (11 Sep 2019 05:43) (107/68 - 117/69)  BP(mean): --  RR: 17 (11 Sep 2019 05:43) (16 - 17)  SpO2: 98% (11 Sep 2019 05:43) (97% - 100%)    CAPILLARY BLOOD GLUCOSE          PHYSICAL EXAM:  GENERAL APPEARANCE: NAD, lethargic  HEENT:   EOMI. conjunctiva clear  NECK: Neck supple, non-tender without lymphadenopathy  CARDIAC: RRR, S1S2 normal, no murmurs, rubs or gallops  LUNGS: CTAB, no wheezing, no rales  ABDOMEN:TTP RUQ,  + guarding. No rebound. + Bowel sounds. +quincy tube in place draining brownish red fluid  MUSCULOSKELETAL: ROM intact  EXTREMITIES: No significant deformity or joint abnormality. No edema. Peripheral pulses intact.   NEUROLOGICAL: Strength and sensation symmetric and intact throughout.   SKIN: Warm and dry.  PSYCHIATRIC: AOx3. Normal affect and behaviour       LABS:                        8.8    7.35  )-----------( 165      ( 11 Sep 2019 05:45 )             27.3     Auto Eosinophil # x     / Auto Eosinophil % x     / Auto Neutrophil # x     / Auto Neutrophil % x     / BANDS % x                            8.1    7.14  )-----------( 182      ( 10 Sep 2019 05:20 )             24.4     Auto Eosinophil # 0.15  / Auto Eosinophil % 2.1   / Auto Neutrophil # 5.15  / Auto Neutrophil % 72.2  / BANDS % x                            8.5    7.75  )-----------( 176      ( 09 Sep 2019 21:51 )             26.0     Auto Eosinophil # x     / Auto Eosinophil % x     / Auto Neutrophil # x     / Auto Neutrophil % x     / BANDS % x        09-11    138  |  100  |  4<L>  ----------------------------<  95  3.3<L>   |  26  |  0.53  09-10    138  |  99  |  5<L>  ----------------------------<  91  3.0<L>   |  26  |  0.54    Ca    9.1      11 Sep 2019 05:45  Mg     1.7     09-11  Phos  3.5     09-11  TPro  6.4  /  Alb  3.3  /  TBili  11.5<H>  /  DBili  5.6<H>  /  AST  48<H>  /  ALT  46<H>  /  AlkPhos  171<H>  09-11  TPro  6.2  /  Alb  3.4  /  TBili  12.1<H>  /  DBili  6.4<H>  /  AST  27  /  ALT  31  /  AlkPhos  160<H>  09-10                  RADIOLOGY & ADDITIONAL TESTS:    Imaging Personally Reviewed:    Consultant(s) Notes Reviewed:      Care Discussed with Consultants/Other Providers:

## 2019-09-11 NOTE — PROGRESS NOTE ADULT - ATTENDING COMMENTS
ON night events noted. Pt seen and examined. She reports continued 3x4 episodes of diarrhea here. Staff reporting BRBPR However pt does not report seeing blood in her stool.     No further intervention at this time while inpatient per GI and surgery.   -monitor H/H closely  -if diarrhea continues will start trial of lomotil as her w/u for c.diff, GI PCR, stool cx all negative  -will f/u w/ IR in regards to drain removal   -continue to optimize pain control  -c/w zosyn for now Pt seen and examined. Today she is more interactive. Abdominal pain has improved and she is tolerating diet. She denies any further BMs since yesterday.     -No further intervention at this time while inpatient per GI and surgery. Pt to be followed up as outpt for cholecystectomy.  Per IR drain will be reassessed as outpt as well.   -Pt currently on zosyn. Drain cx and sensitivities noted. ID was consulted this am for PO recs. Will appreciate recs   -Dc planning for today/tomorrow if pending ID and if pt continues to tolerate diet.

## 2019-09-11 NOTE — PROGRESS NOTE ADULT - PROBLEM SELECTOR PLAN 1
-Patient reporting bloody BMs and BRBPR x 3 days   -Pt states today blood "only w/ wiping." Differential includes hemorrhoids vs. angioectasias  -GI consulted; recs outpatient flexible sigmoidoscopy +/- colonoscopy for further evaluation  -C-diff negative  -GI PCR negative  -F/u stool Ova and Parasites  -encourage PO hydration -Patient reporting bloody BMs and BRBPR x 3 days   -Pt states today blood "only w/ wiping." Differential includes hemorrhoids vs. angioectasias  -GI consulted; recs outpatient flexible sigmoidoscopy +/- colonoscopy for further evaluation  -C-diff negative  -GI PCR negative  -F/u stool Ova and Parasites  -Monitor H/H Patient reported bloody BMs and BRBPR x 3 days  "only w/ wiping on this admission. However today she states that she has not had any more BMs since yesterday.  Differential includes hemorrhoids vs. angiectasias  -GI consulted; recs outpatient flexible sigmoidoscopy +/- colonoscopy for further evaluation  -C-diff negative  -GI PCR negative  -F/u stool Ova and Parasites  -Monitor H/H

## 2019-09-12 VITALS
DIASTOLIC BLOOD PRESSURE: 66 MMHG | TEMPERATURE: 98 F | SYSTOLIC BLOOD PRESSURE: 114 MMHG | OXYGEN SATURATION: 96 % | HEART RATE: 78 BPM | RESPIRATION RATE: 16 BRPM

## 2019-09-12 LAB
ANION GAP SERPL CALC-SCNC: 16 MMO/L — HIGH (ref 7–14)
BASOPHILS # BLD AUTO: 0.05 K/UL — SIGNIFICANT CHANGE UP (ref 0–0.2)
BASOPHILS NFR BLD AUTO: 0.6 % — SIGNIFICANT CHANGE UP (ref 0–2)
BILIRUB DIRECT SERPL-MCNC: 4.1 MG/DL — HIGH (ref 0.1–0.2)
BUN SERPL-MCNC: 6 MG/DL — LOW (ref 7–23)
CALCIUM SERPL-MCNC: 9.2 MG/DL — SIGNIFICANT CHANGE UP (ref 8.4–10.5)
CHLORIDE SERPL-SCNC: 99 MMOL/L — SIGNIFICANT CHANGE UP (ref 98–107)
CO2 SERPL-SCNC: 25 MMOL/L — SIGNIFICANT CHANGE UP (ref 22–31)
CREAT SERPL-MCNC: 0.52 MG/DL — SIGNIFICANT CHANGE UP (ref 0.5–1.3)
EOSINOPHIL # BLD AUTO: 0.22 K/UL — SIGNIFICANT CHANGE UP (ref 0–0.5)
EOSINOPHIL NFR BLD AUTO: 2.4 % — SIGNIFICANT CHANGE UP (ref 0–6)
GLUCOSE SERPL-MCNC: 98 MG/DL — SIGNIFICANT CHANGE UP (ref 70–99)
HCT VFR BLD CALC: 27.5 % — LOW (ref 34.5–45)
HGB BLD-MCNC: 8.7 G/DL — LOW (ref 11.5–15.5)
IMM GRANULOCYTES NFR BLD AUTO: 4.9 % — HIGH (ref 0–1.5)
LYMPHOCYTES # BLD AUTO: 0.94 K/UL — LOW (ref 1–3.3)
LYMPHOCYTES # BLD AUTO: 10.4 % — LOW (ref 13–44)
MAGNESIUM SERPL-MCNC: 1.8 MG/DL — SIGNIFICANT CHANGE UP (ref 1.6–2.6)
MCHC RBC-ENTMCNC: 26.2 PG — LOW (ref 27–34)
MCHC RBC-ENTMCNC: 31.6 % — LOW (ref 32–36)
MCV RBC AUTO: 82.8 FL — SIGNIFICANT CHANGE UP (ref 80–100)
MONOCYTES # BLD AUTO: 0.92 K/UL — HIGH (ref 0–0.9)
MONOCYTES NFR BLD AUTO: 10.2 % — SIGNIFICANT CHANGE UP (ref 2–14)
NEUTROPHILS # BLD AUTO: 6.44 K/UL — SIGNIFICANT CHANGE UP (ref 1.8–7.4)
NEUTROPHILS NFR BLD AUTO: 71.5 % — SIGNIFICANT CHANGE UP (ref 43–77)
NRBC # FLD: 0.13 K/UL — SIGNIFICANT CHANGE UP (ref 0–0)
NRBC FLD-RTO: 1.4 — SIGNIFICANT CHANGE UP
PHOSPHATE SERPL-MCNC: 4.3 MG/DL — SIGNIFICANT CHANGE UP (ref 2.5–4.5)
PLATELET # BLD AUTO: 239 K/UL — SIGNIFICANT CHANGE UP (ref 150–400)
PMV BLD: 10.3 FL — SIGNIFICANT CHANGE UP (ref 7–13)
POTASSIUM SERPL-MCNC: 3.3 MMOL/L — LOW (ref 3.5–5.3)
POTASSIUM SERPL-SCNC: 3.3 MMOL/L — LOW (ref 3.5–5.3)
RBC # BLD: 3.32 M/UL — LOW (ref 3.8–5.2)
RBC # FLD: 21.9 % — HIGH (ref 10.3–14.5)
REVIEW TO FOLLOW: YES — SIGNIFICANT CHANGE UP
SODIUM SERPL-SCNC: 140 MMOL/L — SIGNIFICANT CHANGE UP (ref 135–145)
WBC # BLD: 9.24 K/UL — SIGNIFICANT CHANGE UP (ref 3.8–10.5)
WBC # FLD AUTO: 9.24 K/UL — SIGNIFICANT CHANGE UP (ref 3.8–10.5)

## 2019-09-12 PROCEDURE — 99239 HOSP IP/OBS DSCHRG MGMT >30: CPT | Mod: GC

## 2019-09-12 RX ORDER — POTASSIUM CHLORIDE 20 MEQ
40 PACKET (EA) ORAL ONCE
Refills: 0 | Status: DISCONTINUED | OUTPATIENT
Start: 2019-09-12 | End: 2019-09-12

## 2019-09-12 RX ORDER — POTASSIUM CHLORIDE 20 MEQ
40 PACKET (EA) ORAL ONCE
Refills: 0 | Status: COMPLETED | OUTPATIENT
Start: 2019-09-12 | End: 2019-09-12

## 2019-09-12 RX ADMIN — PANTOPRAZOLE SODIUM 40 MILLIGRAM(S): 20 TABLET, DELAYED RELEASE ORAL at 11:33

## 2019-09-12 RX ADMIN — Medication 1 MILLIGRAM(S): at 11:33

## 2019-09-12 RX ADMIN — PIPERACILLIN AND TAZOBACTAM 25 GRAM(S): 4; .5 INJECTION, POWDER, LYOPHILIZED, FOR SOLUTION INTRAVENOUS at 05:36

## 2019-09-12 RX ADMIN — Medication 40 MILLIEQUIVALENT(S): at 11:32

## 2019-09-12 RX ADMIN — Medication 1 TABLET(S): at 11:32

## 2019-09-12 NOTE — PROGRESS NOTE ADULT - ATTENDING COMMENTS
-No further intervention at this time while inpatient per GI and surgery. Pt to be followed up as outpt for cholecystectomy.  Per IR drain will be reassessed as outpt as well.   -ID recs appreciated.  Zosyn being switched to Augmentin -125 BID and Levofloxacin 750mg/d PO to be continued for an additional 7 days.  -Dc planning for today. Management plan and follow up discusssed with patient and patients daughter at bedside. Further details outlined in discharge documentation. Spent 36 min in discharge planning and coordination.

## 2019-09-12 NOTE — PROGRESS NOTE ADULT - PROBLEM SELECTOR PLAN 4
-Pt met sepsis criteria on admission w/ tachycardia to 106 and leukocytosis to 23.28. Likely 2/2 acute cholecystitis/ pancreatitis  -Leukocytosis now resolved  -Tachycardia now resolved  -s/p zosyn (09/05-  09/12)  -Pt to be discharged on Augmentin -125 BID and Levofloxacin 750mg/d PO  -Blood cultures NGTD   -UA negative, urine culture neg  -encourage PO hydration  -Trend CBCs

## 2019-09-12 NOTE — PROGRESS NOTE ADULT - PROBLEM SELECTOR PLAN 5
-Normocytic anemia  -Hgb 9.1 on admission, likely secondary to hemolysis (see w/o above)  -Hematology consulted; recs elective splenectomy following resolution of active infection (see above)  -c/w home folic acid  -Maintain active type and screen  -Transfuse Hgb < 7

## 2019-09-12 NOTE — PROGRESS NOTE ADULT - REASON FOR ADMISSION
Cholecystitis

## 2019-09-12 NOTE — PROGRESS NOTE ADULT - PROBLEM SELECTOR PLAN 1
Patient reported bloody BMs and BRBPR x 4 days  "only w/ wiping" on this admission. Differential includes hemorrhoids vs. angiectasias  -GI consulted; recs outpatient flexible sigmoidoscopy +/- colonoscopy for further evaluation  -C-diff negative  -GI PCR negative  -F/u stool Ova and Parasites  -Monitor H/H

## 2019-09-12 NOTE — PROGRESS NOTE ADULT - PROVIDER SPECIALTY LIST ADULT
Internal Medicine
Intervent Radiology
Surgery
Gastroenterology
Gastroenterology
Internal Medicine

## 2019-09-12 NOTE — PROGRESS NOTE ADULT - PROBLEM SELECTOR PLAN 3
-Recent admission 07/2019 for acute cholecystitis and gallstone pancreatitis  Underwent ERCP w/ sphincterotomy and stent placement, w/ balloon sweep and removal of multiple dark stones. Cholangiogram sig for mild CHD stricture but no fistula. 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  -AB U/S on this admission sig for acute cholecystitis   -MRCP 9/6 sig for acute cholecystitis w/ Mirrizzi syndrome. And possible pancreatitis  -S/p IR Perc quincy tube placement 9/6 w/ 100cc purulent fluid drained. Spoke w/ IR about drain and drain and pt should f/u as outpt.   -Pt to get outpt lap quincy per surgery  -Wound culture sig for klebsiella, enterobacter. streptococcus  -s/p zosyn (09/05-  09/12)  -Pt to be discharged on Augmentin -125 BID and Levofloxacin 750mg/d PO  -c/w tylenol PRN  -c/w morphine 4mg IV q6 PRN

## 2019-09-12 NOTE — PROGRESS NOTE ADULT - SUBJECTIVE AND OBJECTIVE BOX
Skye Harrington MD  PGY 1 Department of Internal Medicine  Pager: [phone]    Patient is a 39y old  Female who presents with a chief complaint of Cholecystitis       SUBJECTIVE / OVERNIGHT EVENTS: Pt seen and examined. No acute overnight events.        MEDICATIONS  (STANDING):  folic acid 1 milliGRAM(s) Oral daily  pantoprazole  Injectable 40 milliGRAM(s) IV Push daily    MEDICATIONS  (PRN):  acetaminophen   Tablet .. 650 milliGRAM(s) Oral every 6 hours PRN Temp greater or equal to 38C (100.4F), Mild Pain (1 - 3), Moderate Pain (4 - 6)  morphine  - Injectable 4 milliGRAM(s) IV Push every 6 hours PRN Severe Pain (7 - 10)  ondansetron Injectable 4 milliGRAM(s) IV Push every 8 hours PRN Nausea and/or Vomiting      I&O's Summary    10 Sep 2019 07:01  -  11 Sep 2019 07:00  --------------------------------------------------------  IN: 720 mL / OUT: 570 mL / NET: 150 mL        Vital Signs Last 24 Hrs  T(C): 36.5 (12 Sep 2019 05:35), Max: 37.2 (11 Sep 2019 15:35)  T(F): 97.7 (12 Sep 2019 05:35), Max: 99 (11 Sep 2019 15:35)  HR: 78 (12 Sep 2019 05:35) (78 - 96)  BP: 114/66 (12 Sep 2019 05:35) (109/73 - 119/72)  BP(mean): --  RR: 16 (12 Sep 2019 05:35) (16 - 16)  SpO2: 96% (12 Sep 2019 05:35) (96% - 100%)    CAPILLARY BLOOD GLUCOSE          PHYSICAL EXAM:  GENERAL APPEARANCE: NAD, lethargic  HEENT:   EOMI. conjunctiva clear  NECK: Neck supple, non-tender without lymphadenopathy  CARDIAC: RRR, S1S2 normal, no murmurs, rubs or gallops  LUNGS: CTAB, no wheezing, no rales  ABDOMEN:TTP RUQ,  + guarding. No rebound. + Bowel sounds. +quincy tube in place draining brownish red fluid  MUSCULOSKELETAL: ROM intact  EXTREMITIES: No significant deformity or joint abnormality. No edema. Peripheral pulses intact.   NEUROLOGICAL: Strength and sensation symmetric and intact throughout.   SKIN: Warm and dry.  PSYCHIATRIC: AOx3. Normal affect and behaviour       LABS:                        8.8    7.35  )-----------( 165      ( 11 Sep 2019 05:45 )             27.3     Auto Eosinophil # x     / Auto Eosinophil % x     / Auto Neutrophil # x     / Auto Neutrophil % x     / BANDS % x        09-11    138  |  100  |  4<L>  ----------------------------<  95  3.3<L>   |  26  |  0.53    Ca    9.1      11 Sep 2019 05:45  Mg     1.7     09-11  Phos  3.5     09-11  TPro  6.4  /  Alb  3.3  /  TBili  11.5<H>  /  DBili  5.6<H>  /  AST  48<H>  /  ALT  46<H>  /  AlkPhos  171<H>  09-11                  RADIOLOGY & ADDITIONAL TESTS:    Imaging Personally Reviewed:    Consultant(s) Notes Reviewed:      Care Discussed with Consultants/Other Providers:

## 2019-09-12 NOTE — PROGRESS NOTE ADULT - PROBLEM SELECTOR PLAN 6
-Likely 2/2 hemolysis given history of HS and GB disease and triggered from recent per quincy placement  -Total bilirubin and direct T bili now downtrending. Differential today consistent w/ indirect predominant   -s/p perc quincy tube placement 09/7 still draining   -F/u I/Os   -Will need elective splenectomy per Heme  -c/w GB workup (see above)

## 2019-09-12 NOTE — PROGRESS NOTE ADULT - ASSESSMENT
38 Yo f hx of hereditary spherocytosis, Mirizzi syndrome, gallstone pancreatitis, cholecystitis presenting w/ progressively worsening abdominal pain x 1 week, admitted for acute cholecystitis. S/p IR Perc quincy tube placement 9/6.

## 2019-09-13 LAB
O+P SPEC CONC: SIGNIFICANT CHANGE UP
SPECIMEN SOURCE: SIGNIFICANT CHANGE UP

## 2019-09-14 LAB — TRI STN SPEC: SIGNIFICANT CHANGE UP

## 2019-09-18 ENCOUNTER — APPOINTMENT (OUTPATIENT)
Dept: SURGERY | Facility: CLINIC | Age: 39
End: 2019-09-18
Payer: COMMERCIAL

## 2019-09-18 VITALS
HEART RATE: 90 BPM | WEIGHT: 142 LBS | DIASTOLIC BLOOD PRESSURE: 72 MMHG | HEIGHT: 64 IN | SYSTOLIC BLOOD PRESSURE: 117 MMHG | BODY MASS INDEX: 24.24 KG/M2 | TEMPERATURE: 98.8 F

## 2019-09-18 PROCEDURE — 99203 OFFICE O/P NEW LOW 30 MIN: CPT

## 2019-09-18 NOTE — PLAN
[FreeTextEntry1] : Mrs. Bal has had cholecystitis and choledocholithiasis and in the presence of HS, she should have her gallbladder removed. The next question to answer will be wether or not to remove her spleen as well. Given her need for transfusions and folic acid and her icterus, it may be of decent benefit. \par -I will discuss this mgmt with her hematologist as well as GI\par -keep perc quincy tube for now - earliest operative intervention should occur in 4-6 weeks. \par -will follow up for eventual surgical planning\par \par I spent 35min reviewing data, images and information. Greater than 50% of my time was spent in face to face discussion regarding wound healing, postoperative diet and activity.\par \par Chadd Yeung MD\par Acute Care Surgery\par

## 2019-09-18 NOTE — HISTORY OF PRESENT ILLNESS
[de-identified] : Mrs. Bal is a 40 y/o F who has hereditary spherocytosis and a common hepatic duct stricture who recently was admitted with cholangitis/choledocholithiasis and cholecystitis. She underwent and ERCP which revealed stones in her CBD and the known stricture which was then stented. She underwent a percutaneous cholecystostomy tube which put out pus initially but has since been putting out bile. She says her pain has resolved but the tube itself is uncomfortable.

## 2019-09-27 ENCOUNTER — OUTPATIENT (OUTPATIENT)
Dept: OUTPATIENT SERVICES | Facility: HOSPITAL | Age: 39
LOS: 1 days | Discharge: HOME | End: 2019-09-27

## 2019-09-27 DIAGNOSIS — D58.0 HEREDITARY SPHEROCYTOSIS: ICD-10-CM

## 2019-09-27 DIAGNOSIS — Z87.59 PERSONAL HISTORY OF OTHER COMPLICATIONS OF PREGNANCY, CHILDBIRTH AND THE PUERPERIUM: Chronic | ICD-10-CM

## 2019-10-01 ENCOUNTER — APPOINTMENT (OUTPATIENT)
Dept: HEMATOLOGY ONCOLOGY | Facility: CLINIC | Age: 39
End: 2019-10-01

## 2019-10-04 ENCOUNTER — APPOINTMENT (OUTPATIENT)
Dept: GASTROENTEROLOGY | Facility: CLINIC | Age: 39
End: 2019-10-04

## 2019-10-04 LAB — FUNGUS SPEC QL CULT: SIGNIFICANT CHANGE UP

## 2019-10-29 ENCOUNTER — EMERGENCY (EMERGENCY)
Facility: HOSPITAL | Age: 39
LOS: 1 days | Discharge: HOME | End: 2019-10-29
Attending: EMERGENCY MEDICINE | Admitting: EMERGENCY MEDICINE
Payer: MEDICAID

## 2019-10-29 VITALS
DIASTOLIC BLOOD PRESSURE: 57 MMHG | SYSTOLIC BLOOD PRESSURE: 120 MMHG | HEART RATE: 96 BPM | RESPIRATION RATE: 17 BRPM | TEMPERATURE: 99 F | OXYGEN SATURATION: 100 %

## 2019-10-29 DIAGNOSIS — Z87.59 PERSONAL HISTORY OF OTHER COMPLICATIONS OF PREGNANCY, CHILDBIRTH AND THE PUERPERIUM: Chronic | ICD-10-CM

## 2019-10-29 LAB
ALBUMIN SERPL ELPH-MCNC: 4.5 G/DL — SIGNIFICANT CHANGE UP (ref 3.3–5)
ALP SERPL-CCNC: 58 U/L — SIGNIFICANT CHANGE UP (ref 40–120)
ALT FLD-CCNC: 9 U/L — SIGNIFICANT CHANGE UP (ref 4–33)
ANION GAP SERPL CALC-SCNC: 14 MMO/L — SIGNIFICANT CHANGE UP (ref 7–14)
APTT BLD: 30.4 SEC — SIGNIFICANT CHANGE UP (ref 27.5–36.3)
AST SERPL-CCNC: 16 U/L — SIGNIFICANT CHANGE UP (ref 4–32)
BASOPHILS # BLD AUTO: 0.05 K/UL — SIGNIFICANT CHANGE UP (ref 0–0.2)
BASOPHILS NFR BLD AUTO: 0.5 % — SIGNIFICANT CHANGE UP (ref 0–2)
BILIRUB SERPL-MCNC: 18.3 MG/DL — HIGH (ref 0.2–1.2)
BUN SERPL-MCNC: 13 MG/DL — SIGNIFICANT CHANGE UP (ref 7–23)
CALCIUM SERPL-MCNC: 9.6 MG/DL — SIGNIFICANT CHANGE UP (ref 8.4–10.5)
CHLORIDE SERPL-SCNC: 104 MMOL/L — SIGNIFICANT CHANGE UP (ref 98–107)
CO2 SERPL-SCNC: 24 MMOL/L — SIGNIFICANT CHANGE UP (ref 22–31)
CREAT SERPL-MCNC: 0.56 MG/DL — SIGNIFICANT CHANGE UP (ref 0.5–1.3)
EOSINOPHIL # BLD AUTO: 0.21 K/UL — SIGNIFICANT CHANGE UP (ref 0–0.5)
EOSINOPHIL NFR BLD AUTO: 2.2 % — SIGNIFICANT CHANGE UP (ref 0–6)
GLUCOSE SERPL-MCNC: 108 MG/DL — HIGH (ref 70–99)
HCT VFR BLD CALC: 22.9 % — LOW (ref 34.5–45)
HGB BLD-MCNC: 7.4 G/DL — LOW (ref 11.5–15.5)
IMM GRANULOCYTES NFR BLD AUTO: 1.4 % — SIGNIFICANT CHANGE UP (ref 0–1.5)
INR BLD: 1.33 — HIGH (ref 0.88–1.17)
LYMPHOCYTES # BLD AUTO: 1.12 K/UL — SIGNIFICANT CHANGE UP (ref 1–3.3)
LYMPHOCYTES # BLD AUTO: 11.8 % — LOW (ref 13–44)
MAGNESIUM SERPL-MCNC: 1.9 MG/DL — SIGNIFICANT CHANGE UP (ref 1.6–2.6)
MCHC RBC-ENTMCNC: 27.3 PG — SIGNIFICANT CHANGE UP (ref 27–34)
MCHC RBC-ENTMCNC: 32.3 % — SIGNIFICANT CHANGE UP (ref 32–36)
MCV RBC AUTO: 84.5 FL — SIGNIFICANT CHANGE UP (ref 80–100)
MONOCYTES # BLD AUTO: 0.7 K/UL — SIGNIFICANT CHANGE UP (ref 0–0.9)
MONOCYTES NFR BLD AUTO: 7.4 % — SIGNIFICANT CHANGE UP (ref 2–14)
NEUTROPHILS # BLD AUTO: 7.31 K/UL — SIGNIFICANT CHANGE UP (ref 1.8–7.4)
NEUTROPHILS NFR BLD AUTO: 76.7 % — SIGNIFICANT CHANGE UP (ref 43–77)
NRBC # FLD: 0.09 K/UL — SIGNIFICANT CHANGE UP (ref 0–0)
PHOSPHATE SERPL-MCNC: 3.6 MG/DL — SIGNIFICANT CHANGE UP (ref 2.5–4.5)
PLATELET # BLD AUTO: 174 K/UL — SIGNIFICANT CHANGE UP (ref 150–400)
PMV BLD: 9.8 FL — SIGNIFICANT CHANGE UP (ref 7–13)
POTASSIUM SERPL-MCNC: 3.3 MMOL/L — LOW (ref 3.5–5.3)
POTASSIUM SERPL-SCNC: 3.3 MMOL/L — LOW (ref 3.5–5.3)
PROT SERPL-MCNC: 7.2 G/DL — SIGNIFICANT CHANGE UP (ref 6–8.3)
PROTHROM AB SERPL-ACNC: 14.9 SEC — HIGH (ref 9.8–13.1)
RBC # BLD: 2.71 M/UL — LOW (ref 3.8–5.2)
RBC # FLD: 25.2 % — HIGH (ref 10.3–14.5)
SODIUM SERPL-SCNC: 142 MMOL/L — SIGNIFICANT CHANGE UP (ref 135–145)
WBC # BLD: 9.52 K/UL — SIGNIFICANT CHANGE UP (ref 3.8–10.5)
WBC # FLD AUTO: 9.52 K/UL — SIGNIFICANT CHANGE UP (ref 3.8–10.5)

## 2019-10-29 PROCEDURE — 99284 EMERGENCY DEPT VISIT MOD MDM: CPT

## 2019-10-29 NOTE — ED PROVIDER NOTE - CLINICAL SUMMARY MEDICAL DECISION MAKING FREE TEXT BOX
40 yo F hx of hereditary spherocytosis, Mirizzi syndrome, gallstone pancreatitis, cholecystitis presenting w/ concern of yellowish fluid from percutaneous cholecystostomy site.

## 2019-10-29 NOTE — ED PROVIDER NOTE - CARE PLAN
Principal Discharge DX:	Drainage from wound  Secondary Diagnosis:	Hereditary spherocytosis  Secondary Diagnosis:	Cholecystitis

## 2019-10-29 NOTE — ED PROVIDER NOTE - PROGRESS NOTE DETAILS
Went to reassess patient. She appeared comfortable without changes in quincy output. Went to reassess patient. She appeared comfortable without changes in quincy output. CT scan with contracted gallbladder. No evidence of infection.

## 2019-10-29 NOTE — ED PROVIDER NOTE - NSFOLLOWUPINSTRUCTIONS_ED_ALL_ED_FT
You came to the hospital with yellow-thick drainage from the percutaneous cholecystostomy site. You did not have any evidence of infection on your blood work or on your scan of your abdomen. It appeared on the CT scan that your drain was draining the gallbladder well. If you have worsening pain, fevers, chills, bleeding or change in the output of your cholecystostomy bag, please seek medical attention.     1) Drainage: Please follow-up with interventional radiology if you have any other questions regarding the drainage from the site. You can call [phone].       2) Please follow-up with your surgeon as planned for your gallbladder surgery. If you are concerned regarding the drainage please reach out the them:    Chadd Yeung)  Surgery; Surgical Critical Care  [address] [place] [address], [address]  [place] [address]  Phone: [phone]      ~~~  Advance activity as tolerated.  Continue all previously prescribed medications as directed unless otherwise instructed.  Follow up with your primary care physician in 48-72 hours- bring copies of your results.  Return to the ER for worsening or persistent symptoms, and/or ANY NEW OR CONCERNING SYMPTOMS. If you have issues obtaining follow up, please call: [phone] to obtain a doctor or specialist who takes your insurance in your area.  You may call [phone] to make an appointment with the internal medicine clinic.

## 2019-10-29 NOTE — ED PROVIDER NOTE - OBJECTIVE STATEMENT
40 yo F hx of hereditary spherocytosis, Mirizzi syndrome, gallstone pancreatitis, cholecystitis presenting w/ concern of yellowish fluid from percutaneous cholecystostomy site.     She reports that she is concerned that her drain is infected as her gauze around the perc quincy site has been saturated with yellow fluid/pus. The perc quincy drain itself still drains normally, dark green fluid. She is not experiencing more pain from the site or fevers. There is no bleeding from the site either. She had this drain placed earlier on 9/6 in setting of acute cholecystitis w/ Mirrizzi syndrome and possible pancreatitis. She was given a two week course of antibiotics with levofloxacin and augmentin which she has completed.

## 2019-10-29 NOTE — ED PROVIDER NOTE - PMH
Cholecystitis    Hereditary spherocytosis    IUP (intrauterine pregnancy), incidental  16 weeks  Mirizzi's syndrome    Mucous polyp of cervix

## 2019-10-29 NOTE — ED PROVIDER NOTE - ATTENDING CONTRIBUTION TO CARE
I performed a face-to-face evaluation of the patient and performed a history and physical examination. I agree with the history and physical examination.    Hereditary spherocytosis causing cholecystitis leading to perc drainage. Pt. has noted yellow, thick drainage around tube the last few days. No F/pain/redness. Exam shows yellow thick fluid on gauze, small amount can be expressed. Has had decrease in drainage into bag from 100 cc to 50 cc. Concern for infection or malplacement of tube. Labs. CT scan.

## 2019-10-29 NOTE — ED ADULT NURSE NOTE - NSIMPLEMENTINTERV_GEN_ALL_ED
Implemented All Universal Safety Interventions:  [place] to call system. Call bell, personal items and telephone within reach. Instruct patient to call for assistance. Room bathroom lighting operational. Non-slip footwear when patient is off stretcher. Physically safe environment: no spills, clutter or unnecessary equipment. Stretcher in lowest position, wheels locked, appropriate side rails in place.

## 2019-10-29 NOTE — ED PROVIDER NOTE - CONSTITUTIONAL, MLM
normal... Well appearing, well nourished, awake, alert, conversing normally, and in no apparent distress.

## 2019-10-29 NOTE — ED ADULT TRIAGE NOTE - CHIEF COMPLAINT QUOTE
pt c/o right sided gallbladder drain 2/2 gall stones c/o purulent drainage. denies fever/chills. sclera noted to be jaundice.

## 2019-10-29 NOTE — ED PROVIDER NOTE - FAMILY HISTORY
Grandparent  Still living? Unknown  Family history of hereditary spherocytosis, Age at diagnosis: Age Unknown  Family history of hypertension, Age at diagnosis: Age Unknown

## 2019-10-29 NOTE — ED PROVIDER NOTE - PATIENT PORTAL LINK FT
You can access the FollowMyHealth Patient Portal offered by [facility] by registering at the following website: [email or web address]. By joining [employer]’s FollowMyHealth portal, you will also be able to view your health information using other applications (apps) compatible with our system.

## 2019-10-29 NOTE — ED ADULT NURSE NOTE - OBJECTIVE STATEMENT
pt is A&Ox3, ambulatory, able to make needs known and presents with C/O possible infection to right sided ab gallbladder drain. PT notice purulent/ foul smelling drainage in and around drain insertion site this past week end during a dressing change. PT denies any pain or fever but comes to ED for evaluation. PT presents sclera noted to be jaundice. PT scheduled for gallbladder and possible spleen removal 11-

## 2019-10-30 ENCOUNTER — OUTPATIENT (OUTPATIENT)
Dept: OUTPATIENT SERVICES | Facility: HOSPITAL | Age: 39
LOS: 1 days | End: 2019-10-30
Payer: MEDICAID

## 2019-10-30 VITALS
SYSTOLIC BLOOD PRESSURE: 108 MMHG | OXYGEN SATURATION: 100 % | HEART RATE: 90 BPM | RESPIRATION RATE: 16 BRPM | TEMPERATURE: 98 F | DIASTOLIC BLOOD PRESSURE: 54 MMHG

## 2019-10-30 DIAGNOSIS — Z87.59 PERSONAL HISTORY OF OTHER COMPLICATIONS OF PREGNANCY, CHILDBIRTH AND THE PUERPERIUM: Chronic | ICD-10-CM

## 2019-10-30 DIAGNOSIS — K81.9 CHOLECYSTITIS, UNSPECIFIED: ICD-10-CM

## 2019-10-30 LAB
ANISOCYTOSIS BLD QL: SLIGHT — SIGNIFICANT CHANGE UP
BASOPHILS NFR SPEC: 0.9 % — SIGNIFICANT CHANGE UP (ref 0–2)
BLASTS # FLD: 0 % — SIGNIFICANT CHANGE UP (ref 0–0)
EOSINOPHIL NFR FLD: 1.7 % — SIGNIFICANT CHANGE UP (ref 0–6)
GIANT PLATELETS BLD QL SMEAR: PRESENT — SIGNIFICANT CHANGE UP
HIV 1+2 AB+HIV1 P24 AG SERPL QL IA: SIGNIFICANT CHANGE UP
HYPOCHROMIA BLD QL: SLIGHT — SIGNIFICANT CHANGE UP
LYMPHOCYTES NFR SPEC AUTO: 13.2 % — SIGNIFICANT CHANGE UP (ref 13–44)
MACROCYTES BLD QL: SLIGHT — SIGNIFICANT CHANGE UP
METAMYELOCYTES # FLD: 0 % — SIGNIFICANT CHANGE UP (ref 0–1)
MICROCYTES BLD QL: SLIGHT — SIGNIFICANT CHANGE UP
MONOCYTES NFR BLD: 3.5 % — SIGNIFICANT CHANGE UP (ref 2–9)
MYELOCYTES NFR BLD: 0 % — SIGNIFICANT CHANGE UP (ref 0–0)
NEUTROPHIL AB SER-ACNC: 75.4 % — SIGNIFICANT CHANGE UP (ref 43–77)
NEUTS BAND # BLD: 0.9 % — SIGNIFICANT CHANGE UP (ref 0–6)
OTHER - HEMATOLOGY %: 0 — SIGNIFICANT CHANGE UP
OVALOCYTES BLD QL SMEAR: SLIGHT — SIGNIFICANT CHANGE UP
PLATELET COUNT - ESTIMATE: NORMAL — SIGNIFICANT CHANGE UP
POIKILOCYTOSIS BLD QL AUTO: SLIGHT — SIGNIFICANT CHANGE UP
POLYCHROMASIA BLD QL SMEAR: SLIGHT — SIGNIFICANT CHANGE UP
PROMYELOCYTES # FLD: 0 % — SIGNIFICANT CHANGE UP (ref 0–0)
VARIANT LYMPHS # BLD: 4.4 % — SIGNIFICANT CHANGE UP

## 2019-10-30 PROCEDURE — 47531 INJECTION FOR CHOLANGIOGRAM: CPT

## 2019-10-30 PROCEDURE — 74177 CT ABD & PELVIS W/CONTRAST: CPT | Mod: 26

## 2019-10-30 RX ORDER — POTASSIUM CHLORIDE 20 MEQ
20 PACKET (EA) ORAL ONCE
Refills: 0 | Status: COMPLETED | OUTPATIENT
Start: 2019-10-30 | End: 2019-10-30

## 2019-10-30 RX ADMIN — Medication 20 MILLIEQUIVALENT(S): at 01:03

## 2019-10-31 ENCOUNTER — OUTPATIENT (OUTPATIENT)
Dept: OUTPATIENT SERVICES | Facility: HOSPITAL | Age: 39
LOS: 1 days | End: 2019-10-31

## 2019-10-31 VITALS
RESPIRATION RATE: 16 BRPM | SYSTOLIC BLOOD PRESSURE: 106 MMHG | DIASTOLIC BLOOD PRESSURE: 70 MMHG | OXYGEN SATURATION: 98 % | WEIGHT: 138.89 LBS | HEART RATE: 88 BPM | HEIGHT: 63.25 IN | TEMPERATURE: 98 F

## 2019-10-31 DIAGNOSIS — Z98.890 OTHER SPECIFIED POSTPROCEDURAL STATES: Chronic | ICD-10-CM

## 2019-10-31 DIAGNOSIS — D58.0 HEREDITARY SPHEROCYTOSIS: ICD-10-CM

## 2019-10-31 DIAGNOSIS — Z87.59 PERSONAL HISTORY OF OTHER COMPLICATIONS OF PREGNANCY, CHILDBIRTH AND THE PUERPERIUM: Chronic | ICD-10-CM

## 2019-10-31 LAB
HCG SERPL-ACNC: < 5 MIU/ML — SIGNIFICANT CHANGE UP
HCT VFR BLD CALC: 26.3 % — LOW (ref 34.5–45)
HGB BLD-MCNC: 8.4 G/DL — LOW (ref 11.5–15.5)
MCHC RBC-ENTMCNC: 28.2 PG — SIGNIFICANT CHANGE UP (ref 27–34)
MCHC RBC-ENTMCNC: 31.9 % — LOW (ref 32–36)
MCV RBC AUTO: 88.3 FL — SIGNIFICANT CHANGE UP (ref 80–100)
NRBC # FLD: 0.08 K/UL — SIGNIFICANT CHANGE UP (ref 0–0)
PLATELET # BLD AUTO: 200 K/UL — SIGNIFICANT CHANGE UP (ref 150–400)
PMV BLD: 10.5 FL — SIGNIFICANT CHANGE UP (ref 7–13)
POTASSIUM SERPL-MCNC: 3.4 MMOL/L — LOW (ref 3.5–5.3)
POTASSIUM SERPL-SCNC: 3.4 MMOL/L — LOW (ref 3.5–5.3)
RBC # BLD: 2.98 M/UL — LOW (ref 3.8–5.2)
RBC # FLD: 26.9 % — HIGH (ref 10.3–14.5)
WBC # BLD: 10.63 K/UL — HIGH (ref 3.8–10.5)
WBC # FLD AUTO: 10.63 K/UL — HIGH (ref 3.8–10.5)

## 2019-10-31 NOTE — H&P PST ADULT - NSICDXPASTMEDICALHX_GEN_ALL_CORE_FT
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

## 2019-10-31 NOTE — H&P PST ADULT - NSICDXPROBLEM_GEN_ALL_CORE_FT
PROBLEM DIAGNOSES  Problem: Hereditary spherocytosis  Assessment and Plan: scheduled for laparoscopic cholecystectomy, possible open, intra operative cholangiogram on 11/6/2019  Written & verbal preop instructions, gi prophylaxis & surgical soap given  Pt verbalized good understanding.  Teach back done on surgical soap instructions.  pending copy of medical eval report   ucg on admit

## 2019-10-31 NOTE — H&P PST ADULT - NSICDXPASTSURGICALHX_GEN_ALL_CORE_FT
PAST SURGICAL HISTORY:   history 2013 PAST SURGICAL HISTORY:   history 2013    Status post D&C polypectomy 2017

## 2019-10-31 NOTE — H&P PST ADULT - NEGATIVE OPHTHALMOLOGIC SYMPTOMS
no blurred vision R/no loss of vision R/no diplopia/no loss of vision L/no pain L/no pain R/no photophobia/glasses/no blurred vision L

## 2019-10-31 NOTE — H&P PST ADULT - NEGATIVE MUSCULOSKELETAL SYMPTOMS
no muscle cramps/no back pain/no myalgia/no arm pain R/no stiffness/no neck pain/no arm pain L/no joint swelling/no muscle weakness/no arthritis

## 2019-10-31 NOTE — H&P PST ADULT - NEGATIVE RESPIRATORY AND THORAX SYMPTOMS
Client came for TB Skin Test Screening.  Pt was instructed to return between the 48th and to 72th hour with placement form to have tb skin test read. Pt was told that failure to return will result in a voided test, and that they will need to come back and be retested.  Pt was informed to not touch the site, or put band aid  or push contents out of wheal.    See scanned forms  
no wheezing/no cough/no dyspnea/no hemoptysis

## 2019-10-31 NOTE — H&P PST ADULT - GASTROINTESTINAL DETAILS
soft/nontender/no distention/no masses palpable/bowel sounds normal soft/bowel sounds normal/no distention/no masses palpable

## 2019-10-31 NOTE — H&P PST ADULT - NSICDXFAMILYHX_GEN_ALL_CORE_FT
FAMILY HISTORY:  FH: type 2 diabetes mellitus, grandfather    Grandparent  Still living? Unknown  Family history of hypertension, Age at diagnosis: Age Unknown

## 2019-10-31 NOTE — H&P PST ADULT - HISTORY OF PRESENT ILLNESS
38y/o female presents for preop eval for scheduled laparoscopic cholecystectomy, possible open, intraoperative cholangiogram on 11/6/2019.  Pt with h/o Hereditary spherocytosis.  S/p biliary stent insertion in 7/2019 & biliary drainage tube placed in 9/2019.  Per pt recent [facility] ER visit 2 days ago for possible infection at drainage site.

## 2019-10-31 NOTE — H&P PST ADULT - NEGATIVE NEUROLOGICAL SYMPTOMS
no difficulty walking/no loss of consciousness/no transient paralysis/no syncope/no paresthesias/no weakness/no generalized seizures/no vertigo/no loss of sensation

## 2019-11-01 DIAGNOSIS — T85.520A DISPLACEMENT OF BILE DUCT PROSTHESIS, INITIAL ENCOUNTER: ICD-10-CM

## 2019-11-05 ENCOUNTER — TRANSCRIPTION ENCOUNTER (OUTPATIENT)
Age: 39
End: 2019-11-05

## 2019-11-05 PROBLEM — K83.1 OBSTRUCTION OF BILE DUCT: Chronic | Status: ACTIVE | Noted: 2019-10-29

## 2019-11-05 PROBLEM — R16.1 SPLENOMEGALY, NOT ELSEWHERE CLASSIFIED: Chronic | Status: ACTIVE | Noted: 2019-10-31

## 2019-11-05 PROBLEM — K81.9 CHOLECYSTITIS, UNSPECIFIED: Chronic | Status: ACTIVE | Noted: 2019-10-29

## 2019-11-05 PROBLEM — D64.9 ANEMIA, UNSPECIFIED: Chronic | Status: ACTIVE | Noted: 2019-10-31

## 2019-11-05 RX ORDER — SODIUM CHLORIDE 9 MG/ML
1000 INJECTION, SOLUTION INTRAVENOUS
Refills: 0 | Status: DISCONTINUED | OUTPATIENT
Start: 2019-11-06 | End: 2019-11-06

## 2019-11-06 ENCOUNTER — APPOINTMENT (OUTPATIENT)
Dept: SURGERY | Facility: HOSPITAL | Age: 39
End: 2019-11-06

## 2019-11-06 ENCOUNTER — INPATIENT (INPATIENT)
Facility: HOSPITAL | Age: 39
LOS: 0 days | Discharge: HOME | End: 2019-11-06
Attending: INTERNAL MEDICINE | Admitting: INTERNAL MEDICINE
Payer: MEDICAID

## 2019-11-06 ENCOUNTER — RESULT REVIEW (OUTPATIENT)
Age: 39
End: 2019-11-06

## 2019-11-06 VITALS
HEIGHT: 63.25 IN | HEART RATE: 87 BPM | OXYGEN SATURATION: 100 % | RESPIRATION RATE: 16 BRPM | DIASTOLIC BLOOD PRESSURE: 63 MMHG | SYSTOLIC BLOOD PRESSURE: 114 MMHG | WEIGHT: 138.89 LBS | TEMPERATURE: 98 F

## 2019-11-06 VITALS — TEMPERATURE: 98 F

## 2019-11-06 DIAGNOSIS — D58.0 HEREDITARY SPHEROCYTOSIS: ICD-10-CM

## 2019-11-06 DIAGNOSIS — Z98.890 OTHER SPECIFIED POSTPROCEDURAL STATES: Chronic | ICD-10-CM

## 2019-11-06 DIAGNOSIS — Z87.59 PERSONAL HISTORY OF OTHER COMPLICATIONS OF PREGNANCY, CHILDBIRTH AND THE PUERPERIUM: Chronic | ICD-10-CM

## 2019-11-06 LAB
BLD GP AB SCN SERPL QL: NEGATIVE — SIGNIFICANT CHANGE UP
HCG UR QL: NEGATIVE — SIGNIFICANT CHANGE UP
RH IG SCN BLD-IMP: POSITIVE — SIGNIFICANT CHANGE UP

## 2019-11-06 PROCEDURE — 93010 ELECTROCARDIOGRAM REPORT: CPT

## 2019-11-06 PROCEDURE — 88304 TISSUE EXAM BY PATHOLOGIST: CPT | Mod: 26

## 2019-11-06 RX ORDER — OXYCODONE HYDROCHLORIDE 5 MG/1
1 TABLET ORAL
Qty: 10 | Refills: 0
Start: 2019-11-06

## 2019-11-06 NOTE — BRIEF OPERATIVE NOTE - OPERATION/FINDINGS
Percutaneous cholecystostomy tube removed  Multiple adhesions removed  Gallbladder removed without incident.

## 2019-11-06 NOTE — ASU DISCHARGE PLAN (ADULT/PEDIATRIC) - ASU DC SPECIAL INSTRUCTIONSFT
Please follow up with Dr. Yeung in the office in about 2 weeks, please call the office to schedule an appointment at your convenience.

## 2019-11-06 NOTE — ASU DISCHARGE PLAN (ADULT/PEDIATRIC) - NURSING INSTRUCTIONS
You were given 1000mg IV Tylenol for pain management.  Please DO NOT take any Tylenol containing products, such as  Vicodin, Percocet, Excedrin, many cold preparations for the next 6 hours (until 230p).  DO NOT EXCEED 3000MG OF TYLENOL OVER 24 HOURS.     You were given Toradol for pain management. Please DO Not take Motrin/Ibuprofen/Advil/Aleve (NSAIDS) for the next 6 hours (Until 4p) You were given 1000mg IV Tylenol for pain management.  Please DO NOT take any Tylenol containing products, such as  Vicodin, Percocet, Excedrin, many cold preparations for the next 6 hours (until 230p).  DO NOT EXCEED 3000MG OF TYLENOL OVER 24 HOURS.     You were given Toradol for pain management. Please DO Not take Motrin/Ibuprofen/Advil/Aleve (NSAIDS) for the next 6 hours (Until 4p)    Your incisions are covered with a product called Dermabond.  It is a clear covering.  Please do not pick at it, or peel it off.  When showering do not scrub over your incisions.

## 2019-11-06 NOTE — ASU DISCHARGE PLAN (ADULT/PEDIATRIC) - CARE PROVIDER_API CALL
Chadd Yeung)  Surgery; Surgical Critical Care  [address] [facility], [address]  [place] [address]  Phone: [phone]  Fax: [phone]  Follow Up Time: 2 weeks

## 2019-11-15 LAB — SURGICAL PATHOLOGY STUDY: SIGNIFICANT CHANGE UP

## 2019-11-20 ENCOUNTER — APPOINTMENT (OUTPATIENT)
Dept: SURGERY | Facility: CLINIC | Age: 39
End: 2019-11-20
Payer: MEDICAID

## 2019-11-20 VITALS
BODY MASS INDEX: 24.59 KG/M2 | TEMPERATURE: 98.6 F | WEIGHT: 144 LBS | HEIGHT: 64 IN | SYSTOLIC BLOOD PRESSURE: 120 MMHG | HEART RATE: 94 BPM | DIASTOLIC BLOOD PRESSURE: 73 MMHG

## 2019-11-20 PROCEDURE — 99024 POSTOP FOLLOW-UP VISIT: CPT

## 2019-11-25 NOTE — PHYSICAL EXAM
[de-identified] : Well ,comfortable. [de-identified] : Soft, nontender, nondistended. The incisions are healed well without signs of erythema, cellulitis or drainage. No palpable hernia formation.\par

## 2019-11-25 NOTE — HISTORY OF PRESENT ILLNESS
[de-identified] : Mrs. Bal has a history of HS for which she has had multiple transfusions for and ultimately suffered from calculous cholecystitis. She underwent a straightforward cholecystectomy. She has recovered well and denies fevers, chills, abdominal pain, SOB/dyspnea or weakness/fatigue. Tolerating adequate PO intake and GI activity (bowel movements) has recovered without constipation or diarrhea. No complaints regarding incisions and dressings.\par

## 2019-11-25 NOTE — PLAN
[FreeTextEntry1] : Mrs. Bal underwent a laparoscopic cholecystectomy and has recovered well. The incisions are well healed and good PO nutrition is being tolerated. The pathology revealed no evidence of malignancy. \par \par Resume normal activity with gradual resumption of strenuous activity\par Avoid fatty foods. Low fat diet should diarrhea develop\par Follow up with me in clinic if yellowing of skin develops or fever/chills and RUQ pain develops. \par GI was consulted during inpatient and an ERCP/stent was placed, a follow up appointment should be scheduled with the respective gastroenterologist. I spoke with Dr. Bearden (email and phone) last week and they will schedule a time to retrieve her stent. \par I will schedule her splenectomy after she heals from this surgery. She will need preop medical eval as well as pre-op vaccination. \par \par I spent 15min reviewing data, images and information. Greater than 50% of my time was spent in face to face discussion regarding wound healing, postoperative diet and activity.\par \par Chadd Yeung MD\par Acute Care Surgery\par

## 2019-11-25 NOTE — H&P PST ADULT - NSWEIGHTCALCTOOLDRUG_GEN_A_CORE
[facility]    PATIENT'S NAME: Madisyn Elena   ATTENDING PHYSICIAN: Tonia Dunn M.D. OPERATING PHYSICIAN: Tonia Dunn M.D.    PATIENT ACCOUNT#:   [de-identified]    LOCATION:  ENDO  ENDO POOL ROOMS 6 EDWP 10  MEDICAL RECORD #:   [id number] 10:21:50  t: 11/25/2019 10:50:35  [facility] [id number]  CJS/    cc: RADHA Pina M.D. Odilia Mann, MD  used

## 2019-12-17 ENCOUNTER — APPOINTMENT (OUTPATIENT)
Dept: GASTROENTEROLOGY | Facility: CLINIC | Age: 39
End: 2019-12-17
Payer: COMMERCIAL

## 2019-12-17 VITALS
DIASTOLIC BLOOD PRESSURE: 78 MMHG | WEIGHT: 146 LBS | HEIGHT: 64 IN | TEMPERATURE: 99.1 F | BODY MASS INDEX: 24.92 KG/M2 | SYSTOLIC BLOOD PRESSURE: 128 MMHG

## 2019-12-17 DIAGNOSIS — Z98.890 OTHER SPECIFIED POSTPROCEDURAL STATES: ICD-10-CM

## 2019-12-17 DIAGNOSIS — Z87.42 OTHER SPECIFIED POSTPROCEDURAL STATES: ICD-10-CM

## 2019-12-17 PROCEDURE — 99203 OFFICE O/P NEW LOW 30 MIN: CPT

## 2019-12-17 NOTE — REVIEW OF SYSTEMS
[As Noted in HPI] : as noted in HPI [Fever] : no fever [Chills] : no chills [Eyesight Problems] : no eyesight problems [Pelvic Pain] : no pelvic pain [Skin Lesions] : no skin lesions [Joint Swelling] : no joint swelling [Anxiety] : no anxiety [Itching] : no itching [Depression] : no depression [Muscle Weakness] : no muscle weakness [Easy Bruising] : no tendency for easy bruising

## 2019-12-17 NOTE — ASSESSMENT
[FreeTextEntry1] : 1. s/p ercp with stent placement now s/p cholecystectomy\par \par plan patient will call  office to make appointment,\par           contacted by me, await response\par \par 2. 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

## 2019-12-17 NOTE — PHYSICAL EXAM
[General Appearance - Alert] : alert [General Appearance - In No Acute Distress] : in no acute distress [General Appearance - Well Nourished] : well nourished [General Appearance - Well Developed] : well developed [Hearing Threshold Finger Rub Not [place]] : hearing was normal [Respiration, Rhythm And Depth] : normal respiratory rhythm and effort [Auscultation Breath Sounds / Voice Sounds] : lungs were clear to auscultation bilaterally [Heart Sounds] : normal S1 and S2 [Heart Rate And Rhythm] : heart rate was normal and rhythm regular [Bowel Sounds] : normal bowel sounds [Abdomen Soft] : soft [Abdomen Tenderness] : non-tender [No CVA Tenderness] : no ~M costovertebral angle tenderness [Abnormal Walk] : normal gait [Nail Clubbing] : no clubbing  or cyanosis of the fingernails [] : no rash [Motor Exam] : the motor exam was normal [Skin Lesions] : no skin lesions [Sensation] : the sensory exam was normal to light touch and pinprick [No Focal Deficits] : no focal deficits [Oriented To Time, Place, And Person] : oriented to person, place, and time [FreeTextEntry1] : well healed scar

## 2019-12-17 NOTE — HISTORY OF PRESENT ILLNESS
[FreeTextEntry1] : 40 yo female with  h/o hereditary spherocytosis, h/o multiple transfusions and developed calculous cholecystitis. s/p cholecystectomy, had an ERCP and stent placed end of JUly per patient this year.\par patient feeling well. no fevers, no abdominal pain,tolerating po. \par \par patient here for cbd stent removal. patietn also will be scheduled for splenectomy after recovers from cholecystectomy.

## 2019-12-27 ENCOUNTER — OUTPATIENT (OUTPATIENT)
Dept: OUTPATIENT SERVICES | Facility: HOSPITAL | Age: 39
LOS: 1 days | End: 2019-12-27

## 2019-12-27 VITALS
HEIGHT: 64 IN | RESPIRATION RATE: 18 BRPM | HEART RATE: 86 BPM | WEIGHT: 143.96 LBS | SYSTOLIC BLOOD PRESSURE: 120 MMHG | TEMPERATURE: 99 F | OXYGEN SATURATION: 99 % | DIASTOLIC BLOOD PRESSURE: 78 MMHG

## 2019-12-27 DIAGNOSIS — K83.1 OBSTRUCTION OF BILE DUCT: ICD-10-CM

## 2019-12-27 DIAGNOSIS — Z87.59 PERSONAL HISTORY OF OTHER COMPLICATIONS OF PREGNANCY, CHILDBIRTH AND THE PUERPERIUM: Chronic | ICD-10-CM

## 2019-12-27 DIAGNOSIS — Z98.890 OTHER SPECIFIED POSTPROCEDURAL STATES: Chronic | ICD-10-CM

## 2019-12-27 DIAGNOSIS — Z90.49 ACQUIRED ABSENCE OF OTHER SPECIFIED PARTS OF DIGESTIVE TRACT: Chronic | ICD-10-CM

## 2019-12-27 LAB
ALBUMIN SERPL ELPH-MCNC: 4.8 G/DL — SIGNIFICANT CHANGE UP (ref 3.3–5)
ALP SERPL-CCNC: 64 U/L — SIGNIFICANT CHANGE UP (ref 40–120)
ALT FLD-CCNC: 18 U/L — SIGNIFICANT CHANGE UP (ref 4–33)
ANION GAP SERPL CALC-SCNC: 16 MMO/L — HIGH (ref 7–14)
AST SERPL-CCNC: 25 U/L — SIGNIFICANT CHANGE UP (ref 4–32)
BILIRUB SERPL-MCNC: 19.8 MG/DL — HIGH (ref 0.2–1.2)
BUN SERPL-MCNC: 11 MG/DL — SIGNIFICANT CHANGE UP (ref 7–23)
CALCIUM SERPL-MCNC: 9.4 MG/DL — SIGNIFICANT CHANGE UP (ref 8.4–10.5)
CHLORIDE SERPL-SCNC: 102 MMOL/L — SIGNIFICANT CHANGE UP (ref 98–107)
CO2 SERPL-SCNC: 24 MMOL/L — SIGNIFICANT CHANGE UP (ref 22–31)
CREAT SERPL-MCNC: 0.63 MG/DL — SIGNIFICANT CHANGE UP (ref 0.5–1.3)
GLUCOSE SERPL-MCNC: 65 MG/DL — LOW (ref 70–99)
HCG SERPL-ACNC: < 5 MIU/ML — SIGNIFICANT CHANGE UP
HCT VFR BLD CALC: 28.3 % — LOW (ref 34.5–45)
HGB BLD-MCNC: 8.8 G/DL — LOW (ref 11.5–15.5)
MCHC RBC-ENTMCNC: 28.4 PG — SIGNIFICANT CHANGE UP (ref 27–34)
MCHC RBC-ENTMCNC: 31.1 % — LOW (ref 32–36)
MCV RBC AUTO: 91.3 FL — SIGNIFICANT CHANGE UP (ref 80–100)
NRBC # FLD: 0.06 K/UL — SIGNIFICANT CHANGE UP (ref 0–0)
PLATELET # BLD AUTO: 194 K/UL — SIGNIFICANT CHANGE UP (ref 150–400)
PMV BLD: 10.2 FL — SIGNIFICANT CHANGE UP (ref 7–13)
POTASSIUM SERPL-MCNC: 3.2 MMOL/L — LOW (ref 3.5–5.3)
POTASSIUM SERPL-SCNC: 3.2 MMOL/L — LOW (ref 3.5–5.3)
PROT SERPL-MCNC: 6.9 G/DL — SIGNIFICANT CHANGE UP (ref 6–8.3)
RBC # BLD: 3.1 M/UL — LOW (ref 3.8–5.2)
RBC # FLD: 29.2 % — HIGH (ref 10.3–14.5)
SODIUM SERPL-SCNC: 142 MMOL/L — SIGNIFICANT CHANGE UP (ref 135–145)
WBC # BLD: 8.44 K/UL — SIGNIFICANT CHANGE UP (ref 3.8–10.5)
WBC # FLD AUTO: 8.44 K/UL — SIGNIFICANT CHANGE UP (ref 3.8–10.5)

## 2019-12-27 RX ORDER — IBUPROFEN 200 MG
2 TABLET ORAL
Qty: 0 | Refills: 0 | DISCHARGE

## 2019-12-27 RX ORDER — FOLIC ACID 0.8 MG
1 TABLET ORAL
Qty: 0 | Refills: 0 | DISCHARGE

## 2019-12-27 RX ORDER — ACETAMINOPHEN 500 MG
2 TABLET ORAL
Qty: 0 | Refills: 0 | DISCHARGE

## 2019-12-27 NOTE — H&P PST ADULT - NSICDXPROBLEM_GEN_ALL_CORE_FT
PROBLEM DIAGNOSES  Problem: Obstruction of bile duct  Assessment and Plan: scheduled for endoscopic retrograde cholangiopancreatography anesthesia on 01/03/2020.   Verbal and written pre-op instructions provided to patient. Patient verbalized understanding.   Pepcid for GI prophylaxis provided.   Urine pregnancy test DOS-Urine cup provided.

## 2019-12-27 NOTE — H&P PST ADULT - NEGATIVE NEUROLOGICAL SYMPTOMS
no transient paralysis/no paresthesias/no loss of consciousness/no weakness/no generalized seizures/no syncope/no difficulty walking/no vertigo/no loss of sensation

## 2019-12-27 NOTE — H&P PST ADULT - NSICDXPASTMEDICALHX_GEN_ALL_CORE_FT
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

## 2019-12-27 NOTE — H&P PST ADULT - HISTORY OF PRESENT ILLNESS
38y/o female presents to PST unit with pre-op diagnosis of obstruction of bile duct scheduled for endoscopic retrograde cholangiopancreatography anesthesia on 01/03/2020. She is  s/p laparoscopic cholecystectomy, possible open, intraoperative cholangiogram on 11/6/2019 and is now having a stent removed.

## 2019-12-27 NOTE — H&P PST ADULT - NEGATIVE ENMT SYMPTOMS
no hearing difficulty/no tinnitus/no nasal congestion/no ear pain/no vertigo/no gum bleeding/no dry mouth/no throat pain/no dysphagia

## 2019-12-27 NOTE — H&P PST ADULT - ASSESSMENT
40y/o female presents to PST unit with pre-op diagnosis of obstruction of bile duct scheduled for endoscopic retrograde cholangiopancreatography anesthesia on 01/03/2020.

## 2019-12-27 NOTE — H&P PST ADULT - NEGATIVE MUSCULOSKELETAL SYMPTOMS
no back pain/no arthritis/no joint swelling/no myalgia/no muscle cramps/no stiffness/no neck pain/no arm pain L/no muscle weakness/no arm pain R

## 2019-12-27 NOTE — H&P PST ADULT - NSICDXPASTSURGICALHX_GEN_ALL_CORE_FT
PAST SURGICAL HISTORY:   history 2013    History of laparoscopic cholecystectomy     Status post D&C polypectomy 2017

## 2019-12-27 NOTE — H&P PST ADULT - NEGATIVE OPHTHALMOLOGIC SYMPTOMS
no blurred vision L/no blurred vision R/no diplopia/no photophobia/no loss of vision R/no pain R/no loss of vision L/glasses/no pain L

## 2019-12-27 NOTE — H&P PST ADULT - NSANTHOSAYNRD_GEN_A_CORE
No. YOUSUF screening performed.  STOP BANG Legend: 0-2 = LOW Risk; 3-4 = INTERMEDIATE Risk; 5-8 = HIGH Risk

## 2020-01-03 ENCOUNTER — APPOINTMENT (OUTPATIENT)
Dept: GASTROENTEROLOGY | Facility: HOSPITAL | Age: 40
End: 2020-01-03

## 2020-01-03 ENCOUNTER — OUTPATIENT (OUTPATIENT)
Dept: OUTPATIENT SERVICES | Facility: HOSPITAL | Age: 40
LOS: 1 days | Discharge: HOME | End: 2020-01-03
Payer: MEDICAID

## 2020-01-03 VITALS
RESPIRATION RATE: 17 BRPM | HEIGHT: 64 IN | TEMPERATURE: 98 F | SYSTOLIC BLOOD PRESSURE: 114 MMHG | WEIGHT: 145.06 LBS | HEART RATE: 89 BPM | OXYGEN SATURATION: 100 % | DIASTOLIC BLOOD PRESSURE: 71 MMHG

## 2020-01-03 VITALS
SYSTOLIC BLOOD PRESSURE: 117 MMHG | HEART RATE: 98 BPM | DIASTOLIC BLOOD PRESSURE: 71 MMHG | OXYGEN SATURATION: 96 % | RESPIRATION RATE: 20 BRPM

## 2020-01-03 DIAGNOSIS — Z90.49 ACQUIRED ABSENCE OF OTHER SPECIFIED PARTS OF DIGESTIVE TRACT: Chronic | ICD-10-CM

## 2020-01-03 DIAGNOSIS — Z87.59 PERSONAL HISTORY OF OTHER COMPLICATIONS OF PREGNANCY, CHILDBIRTH AND THE PUERPERIUM: Chronic | ICD-10-CM

## 2020-01-03 DIAGNOSIS — K83.1 OBSTRUCTION OF BILE DUCT: ICD-10-CM

## 2020-01-03 DIAGNOSIS — Z98.890 OTHER SPECIFIED POSTPROCEDURAL STATES: Chronic | ICD-10-CM

## 2020-01-03 LAB — HCG UR QL: NEGATIVE — SIGNIFICANT CHANGE UP

## 2020-01-03 PROCEDURE — 43275 ERCP REMOVE FORGN BODY DUCT: CPT | Mod: GC

## 2020-01-03 PROCEDURE — 74328 X-RAY BILE DUCT ENDOSCOPY: CPT | Mod: 26,GC

## 2020-01-03 PROCEDURE — 43264 ERCP REMOVE DUCT CALCULI: CPT | Mod: GC,59

## 2020-01-03 RX ORDER — SODIUM CHLORIDE 9 MG/ML
500 INJECTION, SOLUTION INTRAVENOUS
Refills: 0 | Status: DISCONTINUED | OUTPATIENT
Start: 2020-01-03 | End: 2020-02-03

## 2020-01-03 RX ADMIN — SODIUM CHLORIDE 30 MILLILITER(S): 9 INJECTION, SOLUTION INTRAVENOUS at 10:00

## 2020-01-03 NOTE — ASU PATIENT PROFILE, ADULT - PMH
Anemia    Cholecystitis    Hereditary spherocytosis    IUP (intrauterine pregnancy), incidental  16 weeks, PREVIOUS  patient now has an IUD in place  Mirizzi's syndrome    Mucous polyp of cervix    Obstruction of bile duct    Splenomegaly  due to hereditary sperocytosis

## 2020-01-06 PROBLEM — K83.1 OBSTRUCTION OF BILE DUCT: Chronic | Status: ACTIVE | Noted: 2019-12-27

## 2020-01-15 ENCOUNTER — RESULT REVIEW (OUTPATIENT)
Age: 40
End: 2020-01-15

## 2020-01-15 NOTE — PLAN
[FreeTextEntry1] : Will refer to Mrs. Bal to Dr. Adi Colmenares for her elective splenectomy. \par Discussed with  \par Called Mrs. Bal and left a voice mail. - She needs to follow up for her CBD stent removal. \par \par Chadd Yeung MD\par Acute and Critical Care Surgery\par

## 2020-01-23 ENCOUNTER — APPOINTMENT (OUTPATIENT)
Dept: SURGICAL ONCOLOGY | Facility: CLINIC | Age: 40
End: 2020-01-23
Payer: MEDICAID

## 2020-01-23 VITALS
OXYGEN SATURATION: 100 % | BODY MASS INDEX: 24.75 KG/M2 | SYSTOLIC BLOOD PRESSURE: 113 MMHG | RESPIRATION RATE: 18 BRPM | HEIGHT: 64 IN | DIASTOLIC BLOOD PRESSURE: 68 MMHG | HEART RATE: 85 BPM | WEIGHT: 145 LBS

## 2020-01-23 PROCEDURE — 99205 OFFICE O/P NEW HI 60 MIN: CPT

## 2020-01-23 NOTE — HISTORY OF PRESENT ILLNESS
[de-identified] : Ms. Bal is a 39 year old woman with moderate-to-severe hereditary spherocytosis and splenomegaly who presents for consultation regarding possible splenectomy. She recently underwent a laparoscopic cholecystectomy with Dr. Yeung and has recovered well. She still has symptomatic anemia and would like to know about the potential R/B/A's of splenectomy. She has previously seen Dr. Solorio of hem-onc who recommended splenectomy, but more recently has seen an hematologist in the [place] system. She reports easy fatigability and shortness of breath with any amount of exercise, and her other main complaint is the constant scleral icterus she has.
Additional Progress Note...

## 2020-01-23 NOTE — ASSESSMENT
[FreeTextEntry1] : 39 year old woman with hereditary spherocytosis, symptomatic, s/p cholecystectomy. We discussed the risks of splenectomy including bleeding, thrombosis, wound complications, OPSI. We also discussed that her scleral icterus may or may not improve given the chronicity of her disease. We discussed the possibility of a robotic-assisted splenectomy as well as the inherent surgical risks, including the aforementioned complications. They patient understands and wishes to proceed. Assuming her hematologist still thinks that splenectomy is indicated we will proceed with surgical planning. Splenectomy vaccines ordered today.

## 2020-01-23 NOTE — PHYSICAL EXAM
[Normal] : supple, no neck mass and thyroid not enlarged [Normal Neck Lymph Nodes] : normal neck lymph nodes  [Normal Supraclavicular Lymph Nodes] : normal supraclavicular lymph nodes [Normal Groin Lymph Nodes] : normal groin lymph nodes [Normal Axillary Lymph Nodes] : normal axillary lymph nodes [Normal] : grossly intact [de-identified] : scleral icterus [de-identified] : lap quincy incisions c/d/i. palpable splenomegaly left upper quadrant

## 2020-01-23 NOTE — REASON FOR VISIT
[Initial Consultation] : an initial consultation for [FreeTextEntry2] : hereditary spherocytosis with splenomegaly

## 2020-02-01 ENCOUNTER — OUTPATIENT (OUTPATIENT)
Dept: OUTPATIENT SERVICES | Facility: HOSPITAL | Age: 40
LOS: 1 days | End: 2020-02-01

## 2020-02-01 DIAGNOSIS — Z87.59 PERSONAL HISTORY OF OTHER COMPLICATIONS OF PREGNANCY, CHILDBIRTH AND THE PUERPERIUM: Chronic | ICD-10-CM

## 2020-02-01 DIAGNOSIS — Z98.890 OTHER SPECIFIED POSTPROCEDURAL STATES: Chronic | ICD-10-CM

## 2020-02-01 DIAGNOSIS — Z90.49 ACQUIRED ABSENCE OF OTHER SPECIFIED PARTS OF DIGESTIVE TRACT: Chronic | ICD-10-CM

## 2020-02-06 ENCOUNTER — APPOINTMENT (OUTPATIENT)
Dept: OBGYN | Facility: CLINIC | Age: 40
End: 2020-02-06
Payer: COMMERCIAL

## 2020-02-06 VITALS
DIASTOLIC BLOOD PRESSURE: 55 MMHG | SYSTOLIC BLOOD PRESSURE: 125 MMHG | BODY MASS INDEX: 25.1 KG/M2 | WEIGHT: 147 LBS | HEIGHT: 64 IN | HEART RATE: 89 BPM

## 2020-02-06 DIAGNOSIS — Z87.19 PERSONAL HISTORY OF OTHER DISEASES OF THE DIGESTIVE SYSTEM: ICD-10-CM

## 2020-02-06 DIAGNOSIS — Z30.432 ENCOUNTER FOR REMOVAL OF INTRAUTERINE CONTRACEPTIVE DEVICE: ICD-10-CM

## 2020-02-06 DIAGNOSIS — R45.86 EMOTIONAL LABILITY: ICD-10-CM

## 2020-02-06 DIAGNOSIS — N92.6 IRREGULAR MENSTRUATION, UNSPECIFIED: ICD-10-CM

## 2020-02-06 DIAGNOSIS — Z30.431 ENCOUNTER FOR ROUTINE CHECKING OF INTRAUTERINE CONTRACEPTIVE DEVICE: ICD-10-CM

## 2020-02-06 PROCEDURE — 99395 PREV VISIT EST AGE 18-39: CPT

## 2020-02-06 PROCEDURE — 58301 REMOVE INTRAUTERINE DEVICE: CPT

## 2020-02-06 NOTE — PROCEDURE
[IUD Removal] : IUD [Mirena] : Mirena [Patient] : patient [Risks] : risks [Alternatives] : alternatives [Benefits] : benefits [Speculum Placed] : a speculum was placed in the vagina [Consent Obtained] : consent was obtained prior to the procedure and is detailed in the patient's record [Strings Visualized] : the IUD strings were visualized [IUD Removed - Forceps] : the strings were grasped with forceps and the IUD was removed [Tolerated Well] : the patient tolerated the procedure well [IUD Discarded] : discarded [No Complications] : none [Heavy Vaginal Bleeding] : for heavy vaginal bleeding [Pelvic Pain] : for pelvic pain [de-identified] : irreg mnses

## 2020-02-07 ENCOUNTER — OUTPATIENT (OUTPATIENT)
Dept: OUTPATIENT SERVICES | Facility: HOSPITAL | Age: 40
LOS: 1 days | End: 2020-02-07

## 2020-02-07 VITALS
HEIGHT: 63.5 IN | SYSTOLIC BLOOD PRESSURE: 104 MMHG | HEART RATE: 88 BPM | OXYGEN SATURATION: 99 % | TEMPERATURE: 99 F | DIASTOLIC BLOOD PRESSURE: 60 MMHG | RESPIRATION RATE: 16 BRPM | WEIGHT: 151.9 LBS

## 2020-02-07 DIAGNOSIS — Z87.59 PERSONAL HISTORY OF OTHER COMPLICATIONS OF PREGNANCY, CHILDBIRTH AND THE PUERPERIUM: Chronic | ICD-10-CM

## 2020-02-07 DIAGNOSIS — R16.1 SPLENOMEGALY, NOT ELSEWHERE CLASSIFIED: ICD-10-CM

## 2020-02-07 DIAGNOSIS — Z98.890 OTHER SPECIFIED POSTPROCEDURAL STATES: Chronic | ICD-10-CM

## 2020-02-07 DIAGNOSIS — Z90.49 ACQUIRED ABSENCE OF OTHER SPECIFIED PARTS OF DIGESTIVE TRACT: Chronic | ICD-10-CM

## 2020-02-07 LAB
ALBUMIN SERPL ELPH-MCNC: 4.6 G/DL — SIGNIFICANT CHANGE UP (ref 3.3–5)
ALP SERPL-CCNC: 60 U/L — SIGNIFICANT CHANGE UP (ref 40–120)
ALT FLD-CCNC: 12 U/L — SIGNIFICANT CHANGE UP (ref 4–33)
ANION GAP SERPL CALC-SCNC: 14 MMO/L — SIGNIFICANT CHANGE UP (ref 7–14)
AST SERPL-CCNC: 14 U/L — SIGNIFICANT CHANGE UP (ref 4–32)
BILIRUB SERPL-MCNC: 16.2 MG/DL — HIGH (ref 0.2–1.2)
BLD GP AB SCN SERPL QL: NEGATIVE — SIGNIFICANT CHANGE UP
BUN SERPL-MCNC: 11 MG/DL — SIGNIFICANT CHANGE UP (ref 7–23)
CALCIUM SERPL-MCNC: 9.2 MG/DL — SIGNIFICANT CHANGE UP (ref 8.4–10.5)
CHLORIDE SERPL-SCNC: 101 MMOL/L — SIGNIFICANT CHANGE UP (ref 98–107)
CO2 SERPL-SCNC: 23 MMOL/L — SIGNIFICANT CHANGE UP (ref 22–31)
CREAT SERPL-MCNC: 0.7 MG/DL — SIGNIFICANT CHANGE UP (ref 0.5–1.3)
GLUCOSE SERPL-MCNC: 85 MG/DL — SIGNIFICANT CHANGE UP (ref 70–99)
HCG SERPL-ACNC: < 5 MIU/ML — SIGNIFICANT CHANGE UP
HCT VFR BLD CALC: 28.4 % — LOW (ref 34.5–45)
HGB BLD-MCNC: 8.9 G/DL — LOW (ref 11.5–15.5)
MCHC RBC-ENTMCNC: 28.5 PG — SIGNIFICANT CHANGE UP (ref 27–34)
MCHC RBC-ENTMCNC: 31.3 % — LOW (ref 32–36)
MCV RBC AUTO: 91 FL — SIGNIFICANT CHANGE UP (ref 80–100)
NRBC # FLD: 0.1 K/UL — SIGNIFICANT CHANGE UP (ref 0–0)
PLATELET # BLD AUTO: 202 K/UL — SIGNIFICANT CHANGE UP (ref 150–400)
PMV BLD: 10.4 FL — SIGNIFICANT CHANGE UP (ref 7–13)
POTASSIUM SERPL-MCNC: 3.4 MMOL/L — LOW (ref 3.5–5.3)
POTASSIUM SERPL-SCNC: 3.4 MMOL/L — LOW (ref 3.5–5.3)
PROT SERPL-MCNC: 7 G/DL — SIGNIFICANT CHANGE UP (ref 6–8.3)
RBC # BLD: 3.12 M/UL — LOW (ref 3.8–5.2)
RBC # FLD: 26.7 % — HIGH (ref 10.3–14.5)
RH IG SCN BLD-IMP: POSITIVE — SIGNIFICANT CHANGE UP
SODIUM SERPL-SCNC: 138 MMOL/L — SIGNIFICANT CHANGE UP (ref 135–145)
WBC # BLD: 10.59 K/UL — HIGH (ref 3.8–10.5)
WBC # FLD AUTO: 10.59 K/UL — HIGH (ref 3.8–10.5)

## 2020-02-07 RX ORDER — SODIUM CHLORIDE 9 MG/ML
1000 INJECTION, SOLUTION INTRAVENOUS
Refills: 0 | Status: DISCONTINUED | OUTPATIENT
Start: 2020-02-14 | End: 2020-02-14

## 2020-02-07 RX ORDER — SODIUM CHLORIDE 9 MG/ML
3 INJECTION INTRAMUSCULAR; INTRAVENOUS; SUBCUTANEOUS EVERY 8 HOURS
Refills: 0 | Status: DISCONTINUED | OUTPATIENT
Start: 2020-02-14 | End: 2020-02-17

## 2020-02-07 NOTE — H&P PST ADULT - NEGATIVE ENMT SYMPTOMS
no sinus symptoms/no dysphagia/no ear pain/no tinnitus/no hearing difficulty/no vertigo/no throat pain

## 2020-02-07 NOTE — H&P PST ADULT - HISTORY OF PRESENT ILLNESS
40 y/o female with hx of hereditary spherocytosis  with development bile stones, s/p cholecystectomy 11/6/2019 and ERCP for stent removal 1/3/2010. 40 y/o female with hereditary spherocytosis  with hx of anemia requiring transfusions , bile stones, and enlarged spleen. Pt presents today for presurgical evaluation for Robotic Splenectomy scheduled on 2/14/2020.

## 2020-02-07 NOTE — H&P PST ADULT - SURGICAL SITE INCISION
Pt in office today stating that our office was supposedly doing a PA for Lyrica. Note that a PA was started on 2/23/18, but no documentation of resolution. Need to investigate further. no

## 2020-02-07 NOTE — H&P PST ADULT - NSICDXPASTMEDICALHX_GEN_ALL_CORE_FT
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

## 2020-02-07 NOTE — H&P PST ADULT - NSICDXPASTSURGICALHX_GEN_ALL_CORE_FT
PAST SURGICAL HISTORY:   history     History of laparoscopic cholecystectomy     S/P ERCP     Status post D&C polypectomy 2017 PAST SURGICAL HISTORY:   history     History of laparoscopic cholecystectomy     S/P ERCP 2020    Status post D&C polypectomy 2017

## 2020-02-07 NOTE — H&P PST ADULT - NSICDXPROBLEM_GEN_ALL_CORE_FT
PROBLEM DIAGNOSES  Problem: Hereditary spherocytosis  Assessment and Plan: Pt scheduled for surgery on 2/14/2020.  Pre-op instructions provided. Pt verbalized understanding.   Pepcid provided for GI prophylaxis.   Pt given detailed verbal and written instructions on chlorhexidine wash. Pt verbalized understanding with teachback.

## 2020-02-07 NOTE — H&P PST ADULT - MUSCULOSKELETAL
details… detailed exam no joint erythema/no joint swelling/normal strength/ROM intact/no joint warmth/no calf tenderness

## 2020-02-07 NOTE — H&P PST ADULT - OCCUPATION
After obtaining consent, and per orders of [facility] APRN, injection of rhogam given in Left upper quad. gluteus by Karrie Turner. Patient instructed to remain in clinic for 20 minutes afterwards, and to report any adverse reaction to me immediately. therapist, parent training

## 2020-02-11 LAB
C TRACH RRNA SPEC QL NAA+PROBE: NOT DETECTED
CANDIDA VAG CYTO: NOT DETECTED
CYTOLOGY CVX/VAG DOC THIN PREP: NORMAL
G VAGINALIS+PREV SP MTYP VAG QL MICRO: NOT DETECTED
HPV HIGH+LOW RISK DNA PNL CVX: NOT DETECTED
N GONORRHOEA RRNA SPEC QL NAA+PROBE: NOT DETECTED
SOURCE AMPLIFICATION: NORMAL
T VAGINALIS VAG QL WET PREP: NOT DETECTED

## 2020-02-13 ENCOUNTER — TRANSCRIPTION ENCOUNTER (OUTPATIENT)
Age: 40
End: 2020-02-13

## 2020-02-13 NOTE — ASU PATIENT PROFILE, ADULT - PSH
history  2013  History of laparoscopic cholecystectomy    S/P ERCP  2020  Status post D&C  polypectomy 2017

## 2020-02-13 NOTE — ASU PATIENT PROFILE, ADULT - PMH
Anemia    Cholecystitis    Hereditary spherocytosis    Mirizzi's syndrome    Mucous polyp of cervix    Obstruction of bile duct    Splenomegaly  due to hereditary sperocytosis

## 2020-02-14 ENCOUNTER — APPOINTMENT (OUTPATIENT)
Dept: SURGICAL ONCOLOGY | Facility: HOSPITAL | Age: 40
End: 2020-02-14

## 2020-02-14 ENCOUNTER — RESULT REVIEW (OUTPATIENT)
Age: 40
End: 2020-02-14

## 2020-02-14 ENCOUNTER — INPATIENT (INPATIENT)
Facility: HOSPITAL | Age: 40
LOS: 2 days | Discharge: HOME | End: 2020-02-17
Attending: SURGERY | Admitting: SURGERY
Payer: MEDICAID

## 2020-02-14 VITALS
SYSTOLIC BLOOD PRESSURE: 115 MMHG | RESPIRATION RATE: 16 BRPM | DIASTOLIC BLOOD PRESSURE: 63 MMHG | OXYGEN SATURATION: 100 % | HEART RATE: 92 BPM | TEMPERATURE: 99 F | WEIGHT: 151.9 LBS | HEIGHT: 63.5 IN

## 2020-02-14 DIAGNOSIS — Z98.890 OTHER SPECIFIED POSTPROCEDURAL STATES: Chronic | ICD-10-CM

## 2020-02-14 DIAGNOSIS — Z87.59 PERSONAL HISTORY OF OTHER COMPLICATIONS OF PREGNANCY, CHILDBIRTH AND THE PUERPERIUM: Chronic | ICD-10-CM

## 2020-02-14 DIAGNOSIS — Z90.49 ACQUIRED ABSENCE OF OTHER SPECIFIED PARTS OF DIGESTIVE TRACT: Chronic | ICD-10-CM

## 2020-02-14 DIAGNOSIS — R16.1 SPLENOMEGALY, NOT ELSEWHERE CLASSIFIED: ICD-10-CM

## 2020-02-14 DIAGNOSIS — D58.0 HEREDITARY SPHEROCYTOSIS: ICD-10-CM

## 2020-02-14 LAB
ANION GAP SERPL CALC-SCNC: 15 MMO/L — HIGH (ref 7–14)
BASE EXCESS BLDA CALC-SCNC: -2.5 MMOL/L — SIGNIFICANT CHANGE UP
BASE EXCESS BLDA CALC-SCNC: -3 MMOL/L — SIGNIFICANT CHANGE UP
BASE EXCESS BLDA CALC-SCNC: 0 MMOL/L — SIGNIFICANT CHANGE UP
BUN SERPL-MCNC: 13 MG/DL — SIGNIFICANT CHANGE UP (ref 7–23)
CA-I BLDA-SCNC: 1.1 MMOL/L — LOW (ref 1.15–1.29)
CA-I BLDA-SCNC: 1.14 MMOL/L — LOW (ref 1.15–1.29)
CA-I BLDA-SCNC: 1.17 MMOL/L — SIGNIFICANT CHANGE UP (ref 1.15–1.29)
CALCIUM SERPL-MCNC: 8.4 MG/DL — SIGNIFICANT CHANGE UP (ref 8.4–10.5)
CHLORIDE SERPL-SCNC: 103 MMOL/L — SIGNIFICANT CHANGE UP (ref 98–107)
CO2 SERPL-SCNC: 19 MMOL/L — LOW (ref 22–31)
CREAT SERPL-MCNC: 0.69 MG/DL — SIGNIFICANT CHANGE UP (ref 0.5–1.3)
GLUCOSE BLDA-MCNC: 144 MG/DL — HIGH (ref 70–99)
GLUCOSE BLDA-MCNC: 233 MG/DL — HIGH (ref 70–99)
GLUCOSE BLDA-MCNC: 75 MG/DL — SIGNIFICANT CHANGE UP (ref 70–99)
GLUCOSE SERPL-MCNC: 211 MG/DL — HIGH (ref 70–99)
HCG UR QL: NEGATIVE — SIGNIFICANT CHANGE UP
HCO3 BLDA-SCNC: 22 MMOL/L — SIGNIFICANT CHANGE UP (ref 22–26)
HCO3 BLDA-SCNC: 22 MMOL/L — SIGNIFICANT CHANGE UP (ref 22–26)
HCO3 BLDA-SCNC: 25 MMOL/L — SIGNIFICANT CHANGE UP (ref 22–26)
HCT VFR BLD CALC: 29.6 % — LOW (ref 34.5–45)
HCT VFR BLDA CALC: 23.2 % — LOW (ref 34.5–46.5)
HCT VFR BLDA CALC: 23.6 % — LOW (ref 34.5–46.5)
HCT VFR BLDA CALC: 29.1 % — LOW (ref 34.5–46.5)
HGB BLD-MCNC: 9.8 G/DL — LOW (ref 11.5–15.5)
HGB BLDA-MCNC: 7.4 G/DL — LOW (ref 11.5–15.5)
HGB BLDA-MCNC: 7.6 G/DL — LOW (ref 11.5–15.5)
HGB BLDA-MCNC: 9.4 G/DL — LOW (ref 11.5–15.5)
MAGNESIUM SERPL-MCNC: 1.4 MG/DL — LOW (ref 1.6–2.6)
MCHC RBC-ENTMCNC: 29.5 PG — SIGNIFICANT CHANGE UP (ref 27–34)
MCHC RBC-ENTMCNC: 33.1 % — SIGNIFICANT CHANGE UP (ref 32–36)
MCV RBC AUTO: 89.2 FL — SIGNIFICANT CHANGE UP (ref 80–100)
NRBC # FLD: 0.07 K/UL — SIGNIFICANT CHANGE UP (ref 0–0)
PCO2 BLDA: 37 MMHG — SIGNIFICANT CHANGE UP (ref 32–48)
PCO2 BLDA: 38 MMHG — SIGNIFICANT CHANGE UP (ref 32–48)
PCO2 BLDA: 43 MMHG — SIGNIFICANT CHANGE UP (ref 32–48)
PH BLDA: 7.33 PH — LOW (ref 7.35–7.45)
PH BLDA: 7.38 PH — SIGNIFICANT CHANGE UP (ref 7.35–7.45)
PH BLDA: 7.42 PH — SIGNIFICANT CHANGE UP (ref 7.35–7.45)
PHOSPHATE SERPL-MCNC: 3.6 MG/DL — SIGNIFICANT CHANGE UP (ref 2.5–4.5)
PLATELET # BLD AUTO: 258 K/UL — SIGNIFICANT CHANGE UP (ref 150–400)
PMV BLD: 10.5 FL — SIGNIFICANT CHANGE UP (ref 7–13)
PO2 BLDA: 166 MMHG — HIGH (ref 83–108)
PO2 BLDA: 218 MMHG — HIGH (ref 83–108)
PO2 BLDA: 219 MMHG — HIGH (ref 83–108)
POTASSIUM BLDA-SCNC: 3.1 MMOL/L — LOW (ref 3.4–4.5)
POTASSIUM BLDA-SCNC: 3.5 MMOL/L — SIGNIFICANT CHANGE UP (ref 3.4–4.5)
POTASSIUM BLDA-SCNC: 3.9 MMOL/L — SIGNIFICANT CHANGE UP (ref 3.4–4.5)
POTASSIUM SERPL-MCNC: 3.3 MMOL/L — LOW (ref 3.5–5.3)
POTASSIUM SERPL-SCNC: 3.3 MMOL/L — LOW (ref 3.5–5.3)
RBC # BLD: 3.32 M/UL — LOW (ref 3.8–5.2)
RBC # FLD: 22.5 % — HIGH (ref 10.3–14.5)
SAO2 % BLDA: 100 % — HIGH (ref 95–99)
SODIUM BLDA-SCNC: 135 MMOL/L — LOW (ref 136–146)
SODIUM BLDA-SCNC: 139 MMOL/L — SIGNIFICANT CHANGE UP (ref 136–146)
SODIUM BLDA-SCNC: 140 MMOL/L — SIGNIFICANT CHANGE UP (ref 136–146)
SODIUM SERPL-SCNC: 137 MMOL/L — SIGNIFICANT CHANGE UP (ref 135–145)
WBC # BLD: 35.55 K/UL — HIGH (ref 3.8–10.5)
WBC # FLD AUTO: 35.55 K/UL — HIGH (ref 3.8–10.5)

## 2020-02-14 PROCEDURE — 38120 LAPAROSCOPY SPLENECTOMY: CPT | Mod: 59

## 2020-02-14 PROCEDURE — 48140 PARTIAL REMOVAL OF PANCREAS: CPT

## 2020-02-14 PROCEDURE — 49565: CPT | Mod: 82

## 2020-02-14 PROCEDURE — 88302 TISSUE EXAM BY PATHOLOGIST: CPT | Mod: 26

## 2020-02-14 PROCEDURE — 88307 TISSUE EXAM BY PATHOLOGIST: CPT | Mod: 26

## 2020-02-14 PROCEDURE — 88305 TISSUE EXAM BY PATHOLOGIST: CPT | Mod: 26

## 2020-02-14 PROCEDURE — 48140 PARTIAL REMOVAL OF PANCREAS: CPT | Mod: 82

## 2020-02-14 PROCEDURE — 49585: CPT

## 2020-02-14 PROCEDURE — 38120 LAPAROSCOPY SPLENECTOMY: CPT | Mod: 82,59

## 2020-02-14 RX ORDER — DESMOPRESSIN ACETATE 0.1 MG/1
21 TABLET ORAL ONCE
Refills: 0 | Status: COMPLETED | OUTPATIENT
Start: 2020-02-14 | End: 2020-02-14

## 2020-02-14 RX ORDER — HYDROMORPHONE HYDROCHLORIDE 2 MG/ML
0.5 INJECTION INTRAMUSCULAR; INTRAVENOUS; SUBCUTANEOUS
Refills: 0 | Status: DISCONTINUED | OUTPATIENT
Start: 2020-02-14 | End: 2020-02-15

## 2020-02-14 RX ORDER — ENOXAPARIN SODIUM 100 MG/ML
40 INJECTION SUBCUTANEOUS DAILY
Refills: 0 | Status: DISCONTINUED | OUTPATIENT
Start: 2020-02-15 | End: 2020-02-17

## 2020-02-14 RX ORDER — FOLIC ACID 0.8 MG
1 TABLET ORAL DAILY
Refills: 0 | Status: DISCONTINUED | OUTPATIENT
Start: 2020-02-14 | End: 2020-02-17

## 2020-02-14 RX ORDER — ACETAMINOPHEN 500 MG
1000 TABLET ORAL EVERY 6 HOURS
Refills: 0 | Status: DISCONTINUED | OUTPATIENT
Start: 2020-02-14 | End: 2020-02-17

## 2020-02-14 RX ORDER — SODIUM CHLORIDE 9 MG/ML
1000 INJECTION, SOLUTION INTRAVENOUS
Refills: 0 | Status: DISCONTINUED | OUTPATIENT
Start: 2020-02-14 | End: 2020-02-16

## 2020-02-14 RX ORDER — FOLIC ACID 0.8 MG
1 TABLET ORAL
Qty: 0 | Refills: 0 | DISCHARGE

## 2020-02-14 RX ORDER — HYDROMORPHONE HYDROCHLORIDE 2 MG/ML
1 INJECTION INTRAMUSCULAR; INTRAVENOUS; SUBCUTANEOUS EVERY 6 HOURS
Refills: 0 | Status: DISCONTINUED | OUTPATIENT
Start: 2020-02-14 | End: 2020-02-14

## 2020-02-14 RX ORDER — ONDANSETRON 8 MG/1
4 TABLET, FILM COATED ORAL ONCE
Refills: 0 | Status: DISCONTINUED | OUTPATIENT
Start: 2020-02-14 | End: 2020-02-17

## 2020-02-14 RX ORDER — POTASSIUM CHLORIDE 20 MEQ
10 PACKET (EA) ORAL
Refills: 0 | Status: COMPLETED | OUTPATIENT
Start: 2020-02-14 | End: 2020-02-15

## 2020-02-14 RX ORDER — HYDROMORPHONE HYDROCHLORIDE 2 MG/ML
0.5 INJECTION INTRAMUSCULAR; INTRAVENOUS; SUBCUTANEOUS EVERY 4 HOURS
Refills: 0 | Status: DISCONTINUED | OUTPATIENT
Start: 2020-02-14 | End: 2020-02-16

## 2020-02-14 RX ORDER — MAGNESIUM SULFATE 500 MG/ML
1 VIAL (ML) INJECTION ONCE
Refills: 0 | Status: COMPLETED | OUTPATIENT
Start: 2020-02-14 | End: 2020-02-16

## 2020-02-14 RX ADMIN — Medication 400 MILLIGRAM(S): at 22:28

## 2020-02-14 RX ADMIN — SODIUM CHLORIDE 100 MILLILITER(S): 9 INJECTION, SOLUTION INTRAVENOUS at 22:56

## 2020-02-14 RX ADMIN — HYDROMORPHONE HYDROCHLORIDE 0.5 MILLIGRAM(S): 2 INJECTION INTRAMUSCULAR; INTRAVENOUS; SUBCUTANEOUS at 23:45

## 2020-02-14 RX ADMIN — SODIUM CHLORIDE 30 MILLILITER(S): 9 INJECTION, SOLUTION INTRAVENOUS at 11:53

## 2020-02-14 RX ADMIN — HYDROMORPHONE HYDROCHLORIDE 0.5 MILLIGRAM(S): 2 INJECTION INTRAMUSCULAR; INTRAVENOUS; SUBCUTANEOUS at 23:13

## 2020-02-14 RX ADMIN — Medication 1000 MILLIGRAM(S): at 23:00

## 2020-02-14 RX ADMIN — HYDROMORPHONE HYDROCHLORIDE 0.5 MILLIGRAM(S): 2 INJECTION INTRAMUSCULAR; INTRAVENOUS; SUBCUTANEOUS at 22:46

## 2020-02-14 RX ADMIN — HYDROMORPHONE HYDROCHLORIDE 0.5 MILLIGRAM(S): 2 INJECTION INTRAMUSCULAR; INTRAVENOUS; SUBCUTANEOUS at 23:00

## 2020-02-14 RX ADMIN — SODIUM CHLORIDE 3 MILLILITER(S): 9 INJECTION INTRAMUSCULAR; INTRAVENOUS; SUBCUTANEOUS at 22:38

## 2020-02-14 NOTE — BRIEF OPERATIVE NOTE - OPERATION/FINDINGS
robotic splenectomy - spleen was extremely large. robotic splenectomy - spleen was extremely large. pt received b/l rectus block at end of case

## 2020-02-15 LAB
ANION GAP SERPL CALC-SCNC: 14 MMO/L — SIGNIFICANT CHANGE UP (ref 7–14)
BUN SERPL-MCNC: 9 MG/DL — SIGNIFICANT CHANGE UP (ref 7–23)
CALCIUM SERPL-MCNC: 8.9 MG/DL — SIGNIFICANT CHANGE UP (ref 8.4–10.5)
CHLORIDE SERPL-SCNC: 102 MMOL/L — SIGNIFICANT CHANGE UP (ref 98–107)
CO2 SERPL-SCNC: 19 MMOL/L — LOW (ref 22–31)
CREAT SERPL-MCNC: 0.62 MG/DL — SIGNIFICANT CHANGE UP (ref 0.5–1.3)
GLUCOSE SERPL-MCNC: 111 MG/DL — HIGH (ref 70–99)
HCT VFR BLD CALC: 30.4 % — LOW (ref 34.5–45)
HGB BLD-MCNC: 9.9 G/DL — LOW (ref 11.5–15.5)
MAGNESIUM SERPL-MCNC: 1.7 MG/DL — SIGNIFICANT CHANGE UP (ref 1.6–2.6)
MCHC RBC-ENTMCNC: 29.6 PG — SIGNIFICANT CHANGE UP (ref 27–34)
MCHC RBC-ENTMCNC: 32.6 % — SIGNIFICANT CHANGE UP (ref 32–36)
MCV RBC AUTO: 91 FL — SIGNIFICANT CHANGE UP (ref 80–100)
NRBC # FLD: 0.08 K/UL — SIGNIFICANT CHANGE UP (ref 0–0)
PHOSPHATE SERPL-MCNC: 3.7 MG/DL — SIGNIFICANT CHANGE UP (ref 2.5–4.5)
PLATELET # BLD AUTO: 278 K/UL — SIGNIFICANT CHANGE UP (ref 150–400)
PMV BLD: 10.9 FL — SIGNIFICANT CHANGE UP (ref 7–13)
POTASSIUM SERPL-MCNC: 3.6 MMOL/L — SIGNIFICANT CHANGE UP (ref 3.5–5.3)
POTASSIUM SERPL-SCNC: 3.6 MMOL/L — SIGNIFICANT CHANGE UP (ref 3.5–5.3)
RBC # BLD: 3.34 M/UL — LOW (ref 3.8–5.2)
RBC # FLD: 24.3 % — HIGH (ref 10.3–14.5)
SODIUM SERPL-SCNC: 135 MMOL/L — SIGNIFICANT CHANGE UP (ref 135–145)
WBC # BLD: 33.47 K/UL — HIGH (ref 3.8–10.5)
WBC # FLD AUTO: 33.47 K/UL — HIGH (ref 3.8–10.5)

## 2020-02-15 RX ORDER — MAGNESIUM SULFATE 500 MG/ML
2 VIAL (ML) INJECTION ONCE
Refills: 0 | Status: COMPLETED | OUTPATIENT
Start: 2020-02-15 | End: 2020-02-15

## 2020-02-15 RX ORDER — HYDROMORPHONE HYDROCHLORIDE 2 MG/ML
1 INJECTION INTRAMUSCULAR; INTRAVENOUS; SUBCUTANEOUS EVERY 4 HOURS
Refills: 0 | Status: DISCONTINUED | OUTPATIENT
Start: 2020-02-15 | End: 2020-02-16

## 2020-02-15 RX ADMIN — Medication 400 MILLIGRAM(S): at 10:37

## 2020-02-15 RX ADMIN — SODIUM CHLORIDE 3 MILLILITER(S): 9 INJECTION INTRAMUSCULAR; INTRAVENOUS; SUBCUTANEOUS at 21:48

## 2020-02-15 RX ADMIN — SODIUM CHLORIDE 3 MILLILITER(S): 9 INJECTION INTRAMUSCULAR; INTRAVENOUS; SUBCUTANEOUS at 15:31

## 2020-02-15 RX ADMIN — HYDROMORPHONE HYDROCHLORIDE 1 MILLIGRAM(S): 2 INJECTION INTRAMUSCULAR; INTRAVENOUS; SUBCUTANEOUS at 15:36

## 2020-02-15 RX ADMIN — HYDROMORPHONE HYDROCHLORIDE 0.5 MILLIGRAM(S): 2 INJECTION INTRAMUSCULAR; INTRAVENOUS; SUBCUTANEOUS at 09:00

## 2020-02-15 RX ADMIN — Medication 100 MILLIEQUIVALENT(S): at 02:09

## 2020-02-15 RX ADMIN — HYDROMORPHONE HYDROCHLORIDE 1 MILLIGRAM(S): 2 INJECTION INTRAMUSCULAR; INTRAVENOUS; SUBCUTANEOUS at 23:58

## 2020-02-15 RX ADMIN — Medication 1000 MILLIGRAM(S): at 17:00

## 2020-02-15 RX ADMIN — HYDROMORPHONE HYDROCHLORIDE 1 MILLIGRAM(S): 2 INJECTION INTRAMUSCULAR; INTRAVENOUS; SUBCUTANEOUS at 19:52

## 2020-02-15 RX ADMIN — Medication 100 MILLIEQUIVALENT(S): at 03:16

## 2020-02-15 RX ADMIN — Medication 1 MILLIGRAM(S): at 12:33

## 2020-02-15 RX ADMIN — Medication 100 MILLIEQUIVALENT(S): at 01:08

## 2020-02-15 RX ADMIN — HYDROMORPHONE HYDROCHLORIDE 1 MILLIGRAM(S): 2 INJECTION INTRAMUSCULAR; INTRAVENOUS; SUBCUTANEOUS at 20:52

## 2020-02-15 RX ADMIN — Medication 1000 MILLIGRAM(S): at 10:38

## 2020-02-15 RX ADMIN — Medication 50 GRAM(S): at 12:33

## 2020-02-15 RX ADMIN — HYDROMORPHONE HYDROCHLORIDE 0.5 MILLIGRAM(S): 2 INJECTION INTRAMUSCULAR; INTRAVENOUS; SUBCUTANEOUS at 08:06

## 2020-02-15 RX ADMIN — Medication 400 MILLIGRAM(S): at 16:23

## 2020-02-15 RX ADMIN — HYDROMORPHONE HYDROCHLORIDE 1 MILLIGRAM(S): 2 INJECTION INTRAMUSCULAR; INTRAVENOUS; SUBCUTANEOUS at 16:22

## 2020-02-15 NOTE — CHART NOTE - NSCHARTNOTEFT_GEN_A_CORE
POST-OPERATIVE NOTE    Subjective:  Patient is s/p robotic assisted splenectmoy. Recovering appropriately.     Vital Signs Last 24 Hrs  T(C): 37.1 (15 Feb 2020 01:02), Max: 37.3 (2020 11:20)  T(F): 98.8 (15 Feb 2020 01:02), Max: 99.1 (2020 11:20)  HR: 101 (15 Feb 2020 01:02) (88 - 102)  BP: 123/61 (15 Feb 2020 01:02) (110/75 - 123/61)  BP(mean): 73 (15 Feb 2020 00:00) (73 - 80)  RR: 16 (15 Feb 2020 01:02) (15 - 25)  SpO2: 98% (15 Feb 2020 01:02) (94% - 100%)  I&O's Detail    2020 07:01  -  15 Feb 2020 05:53  --------------------------------------------------------  IN:    lactated ringers.: 400 mL  Total IN: 400 mL    OUT:    Bulb: 260 mL    Voided: 800 mL  Total OUT: 1060 mL    Total NET: -660 mL        enoxaparin Injectable 40    PAST MEDICAL & SURGICAL HISTORY:  Obstruction of bile duct  Splenomegaly: due to hereditary sperocytosis  Anemia  Cholecystitis  Mirizzi's syndrome  Mucous polyp of cervix  Hereditary spherocytosis  S/P ERCP: 2020  History of laparoscopic cholecystectomy: 2019  Status post D&C: polypectomy 2017   history:         Physical Exam:  General: NAD, resting comfortably in bed  Pulmonary: Nonlabored breathing, no respiratory distress  Cardiovascular: NSR  Abdominal: soft, appropriately tender in LUQ. Incision dressing c/d/i. MELINDA drain w/ SS output  Extremities: WWP      LABS:                        9.8    35.55 )-----------( 258      ( 2020 22:15 )             29.6     02-14    137  |  103  |  13  ----------------------------<  211<H>  3.3<L>   |  19<L>  |  0.69    Ca    8.4      2020 22:15  Phos  3.6     02-14  Mg     1.4             Assessment:  The patient is a 39y Female who is now several hours post-op from a robotic assisted splenectomy.    Plan:  - CLD  - Pain control as needed: Tylenol, Dilaudid  - s/p tap block   - DVT ppx: Lovenox  - OOB and ambulating as tolerated  - F/u AM labs

## 2020-02-15 NOTE — PROGRESS NOTE ADULT - ASSESSMENT
ASSESSMENT:   39F s/p robotic assisted splenectomy for hereditary spherocytosis, recovering well post-operatively.     PLAN:  - Diet: CLD  - Pain control as needed: Tylenol, Dilaudid  - DVT ppx: Lovenox  - OOB and ambulating as tolerated  - F/u AM labs    D- Team Surgery   [id number] ASSESSMENT:   39F s/p robotic assisted splenectomy for hereditary spherocytosis, recovering well post-operatively.     PLAN:  - Diet: Regular   - Pain control PRN  - DVT ppx: Lovenox  - OOB and ambulating as tolerated  - F/u AM labs    D- Team Surgery   [id number]

## 2020-02-15 NOTE — PROGRESS NOTE ADULT - SUBJECTIVE AND OBJECTIVE BOX
TEAM D SURGERY PROGRESS NOTE    39yFemale    SUBJECTIVE:  Patient seen and examined at bedside.  Denies fevers, chills, nausea, vomiting, chest pain, and shortness of breath.     --------------------------------------------------------------------------------------------------  OBJECTIVE:     Physical Exam:  General: AAOx3, NAD, resting comfortably   HEENT: NC/AT  Respiratory: no increased work of breathing   Abdomen: soft, appropriately tender in LUQ. Incision dressing c/d/i. MELINDA drain w/ SS output  Extremities: warm and well perfused  --------------------------------------------------------------------------------------------------  Vital Signs:  Vital Signs Last 24 Hrs  T(C): 37.2 (15 Feb 2020 06:19), Max: 37.3 (14 Feb 2020 11:20)  T(F): 99 (15 Feb 2020 06:19), Max: 99.1 (14 Feb 2020 11:20)  HR: 110 (15 Feb 2020 06:19) (88 - 110)  BP: 104/55 (15 Feb 2020 06:19) (104/55 - 123/61)  BP(mean): 73 (15 Feb 2020 00:00) (73 - 80)  RR: 16 (15 Feb 2020 06:19) (15 - 25)  SpO2: 100% (15 Feb 2020 06:19) (94% - 100%)    --------------------------------------------------------------------------------------------------  Inputs/Outputs:    14 Feb 2020 07:01  -  15 Feb 2020 07:00  --------------------------------------------------------  IN:    lactated ringers.: 400 mL  Total IN: 400 mL    OUT:    Bulb: 310 mL    Voided: 800 mL  Total OUT: 1110 mL    Total NET: -710 mL        --------------------------------------------------------------------------------------------------  Laboratories:                        9.8    35.55 )-----------( 258      ( 14 Feb 2020 22:15 )             29.6         14 Feb 2020 22:15    137    |  103    |  13     ----------------------------<  211    3.3     |  19     |  0.69     Ca    8.4        14 Feb 2020 22:15  Phos  3.6       14 Feb 2020 22:15  Mg     1.4       14 Feb 2020 22:15        --------------------------------------------------------------------------------------------------  Medications:  MEDICATIONS  (STANDING):  acetaminophen  IVPB .. 1000 milliGRAM(s) IV Intermittent every 6 hours  enoxaparin Injectable 40 milliGRAM(s) SubCutaneous daily  folic acid 1 milliGRAM(s) Oral daily  lactated ringers. 1000 milliLiter(s) (100 mL/Hr) IV Continuous <Continuous>  magnesium sulfate  IVPB 1 Gram(s) IV Intermittent once  sodium chloride 0.9% lock flush 3 milliLiter(s) IV Push every 8 hours    MEDICATIONS  (PRN):  HYDROmorphone  Injectable 0.5 milliGRAM(s) IV Push every 10 minutes PRN Moderate Pain (4 - 6)  HYDROmorphone  Injectable 0.5 milliGRAM(s) IV Push every 4 hours PRN Moderate Pain (4 - 6)  ondansetron Injectable 4 milliGRAM(s) IV Push once PRN Nausea and/or Vomiting TEAM D SURGERY PROGRESS NOTE    39yFemale    SUBJECTIVE:  Patient seen and examined at bedside. No acute events overnight. Complaining of abdominal pain improved with meds. GI -flatus. -BM.  Denies fevers, chills, nausea, vomiting, chest pain, and shortness of breath.     --------------------------------------------------------------------------------------------------  OBJECTIVE:     Physical Exam:  General: AAOx3, NAD, resting comfortably   HEENT: NC/AT  Respiratory: no increased work of breathing   Abdomen: soft, mildly tender in LUQ., nondistended. Incision dressing c/d/i. MELINDA drain w/ SS output  Extremities: warm and well perfused  --------------------------------------------------------------------------------------------------  Vital Signs:  Vital Signs Last 24 Hrs  T(C): 37.2 (15 Feb 2020 06:19), Max: 37.3 (14 Feb 2020 11:20)  T(F): 99 (15 Feb 2020 06:19), Max: 99.1 (14 Feb 2020 11:20)  HR: 110 (15 Feb 2020 06:19) (88 - 110)  BP: 104/55 (15 Feb 2020 06:19) (104/55 - 123/61)  BP(mean): 73 (15 Feb 2020 00:00) (73 - 80)  RR: 16 (15 Feb 2020 06:19) (15 - 25)  SpO2: 100% (15 Feb 2020 06:19) (94% - 100%)    --------------------------------------------------------------------------------------------------  Inputs/Outputs:    14 Feb 2020 07:01  -  15 Feb 2020 07:00  --------------------------------------------------------  IN:    lactated ringers.: 400 mL  Total IN: 400 mL    OUT:    Bulb: 310 mL    Voided: 800 mL  Total OUT: 1110 mL    Total NET: -710 mL        --------------------------------------------------------------------------------------------------  Laboratories:                        9.8    35.55 )-----------( 258      ( 14 Feb 2020 22:15 )             29.6         14 Feb 2020 22:15    137    |  103    |  13     ----------------------------<  211    3.3     |  19     |  0.69     Ca    8.4        14 Feb 2020 22:15  Phos  3.6       14 Feb 2020 22:15  Mg     1.4       14 Feb 2020 22:15        --------------------------------------------------------------------------------------------------  Medications:  MEDICATIONS  (STANDING):  acetaminophen  IVPB .. 1000 milliGRAM(s) IV Intermittent every 6 hours  enoxaparin Injectable 40 milliGRAM(s) SubCutaneous daily  folic acid 1 milliGRAM(s) Oral daily  lactated ringers. 1000 milliLiter(s) (100 mL/Hr) IV Continuous <Continuous>  magnesium sulfate  IVPB 1 Gram(s) IV Intermittent once  sodium chloride 0.9% lock flush 3 milliLiter(s) IV Push every 8 hours    MEDICATIONS  (PRN):  HYDROmorphone  Injectable 0.5 milliGRAM(s) IV Push every 10 minutes PRN Moderate Pain (4 - 6)  HYDROmorphone  Injectable 0.5 milliGRAM(s) IV Push every 4 hours PRN Moderate Pain (4 - 6)  ondansetron Injectable 4 milliGRAM(s) IV Push once PRN Nausea and/or Vomiting

## 2020-02-16 LAB
ANION GAP SERPL CALC-SCNC: 13 MMO/L — SIGNIFICANT CHANGE UP (ref 7–14)
BUN SERPL-MCNC: 6 MG/DL — LOW (ref 7–23)
CALCIUM SERPL-MCNC: 9.2 MG/DL — SIGNIFICANT CHANGE UP (ref 8.4–10.5)
CHLORIDE SERPL-SCNC: 98 MMOL/L — SIGNIFICANT CHANGE UP (ref 98–107)
CO2 SERPL-SCNC: 24 MMOL/L — SIGNIFICANT CHANGE UP (ref 22–31)
CREAT SERPL-MCNC: 0.49 MG/DL — LOW (ref 0.5–1.3)
GLUCOSE SERPL-MCNC: 109 MG/DL — HIGH (ref 70–99)
HCT VFR BLD CALC: 32.2 % — LOW (ref 34.5–45)
HGB BLD-MCNC: 10 G/DL — LOW (ref 11.5–15.5)
MAGNESIUM SERPL-MCNC: 2 MG/DL — SIGNIFICANT CHANGE UP (ref 1.6–2.6)
MCHC RBC-ENTMCNC: 29.4 PG — SIGNIFICANT CHANGE UP (ref 27–34)
MCHC RBC-ENTMCNC: 31.1 % — LOW (ref 32–36)
MCV RBC AUTO: 94.7 FL — SIGNIFICANT CHANGE UP (ref 80–100)
NRBC # FLD: 0.05 K/UL — SIGNIFICANT CHANGE UP (ref 0–0)
PHOSPHATE SERPL-MCNC: 2.8 MG/DL — SIGNIFICANT CHANGE UP (ref 2.5–4.5)
PLATELET # BLD AUTO: 345 K/UL — SIGNIFICANT CHANGE UP (ref 150–400)
PMV BLD: 10.6 FL — SIGNIFICANT CHANGE UP (ref 7–13)
POTASSIUM SERPL-MCNC: 3.4 MMOL/L — LOW (ref 3.5–5.3)
POTASSIUM SERPL-SCNC: 3.4 MMOL/L — LOW (ref 3.5–5.3)
RBC # BLD: 3.4 M/UL — LOW (ref 3.8–5.2)
RBC # FLD: 23.3 % — HIGH (ref 10.3–14.5)
SODIUM SERPL-SCNC: 135 MMOL/L — SIGNIFICANT CHANGE UP (ref 135–145)
WBC # BLD: 27.46 K/UL — HIGH (ref 3.8–10.5)
WBC # FLD AUTO: 27.46 K/UL — HIGH (ref 3.8–10.5)

## 2020-02-16 RX ORDER — POTASSIUM PHOSPHATE, MONOBASIC POTASSIUM PHOSPHATE, DIBASIC 236; 224 MG/ML; MG/ML
15 INJECTION, SOLUTION INTRAVENOUS ONCE
Refills: 0 | Status: COMPLETED | OUTPATIENT
Start: 2020-02-16 | End: 2020-02-16

## 2020-02-16 RX ORDER — OXYCODONE HYDROCHLORIDE 5 MG/1
10 TABLET ORAL EVERY 4 HOURS
Refills: 0 | Status: DISCONTINUED | OUTPATIENT
Start: 2020-02-16 | End: 2020-02-17

## 2020-02-16 RX ORDER — SENNA PLUS 8.6 MG/1
1 TABLET ORAL AT BEDTIME
Refills: 0 | Status: DISCONTINUED | OUTPATIENT
Start: 2020-02-16 | End: 2020-02-17

## 2020-02-16 RX ORDER — POTASSIUM CHLORIDE 20 MEQ
10 PACKET (EA) ORAL
Refills: 0 | Status: COMPLETED | OUTPATIENT
Start: 2020-02-16 | End: 2020-02-16

## 2020-02-16 RX ORDER — OXYCODONE HYDROCHLORIDE 5 MG/1
5 TABLET ORAL EVERY 4 HOURS
Refills: 0 | Status: DISCONTINUED | OUTPATIENT
Start: 2020-02-16 | End: 2020-02-17

## 2020-02-16 RX ADMIN — SODIUM CHLORIDE 3 MILLILITER(S): 9 INJECTION INTRAMUSCULAR; INTRAVENOUS; SUBCUTANEOUS at 22:52

## 2020-02-16 RX ADMIN — HYDROMORPHONE HYDROCHLORIDE 1 MILLIGRAM(S): 2 INJECTION INTRAMUSCULAR; INTRAVENOUS; SUBCUTANEOUS at 04:26

## 2020-02-16 RX ADMIN — HYDROMORPHONE HYDROCHLORIDE 1 MILLIGRAM(S): 2 INJECTION INTRAMUSCULAR; INTRAVENOUS; SUBCUTANEOUS at 00:58

## 2020-02-16 RX ADMIN — Medication 1 MILLIGRAM(S): at 12:58

## 2020-02-16 RX ADMIN — OXYCODONE HYDROCHLORIDE 10 MILLIGRAM(S): 5 TABLET ORAL at 20:35

## 2020-02-16 RX ADMIN — ENOXAPARIN SODIUM 40 MILLIGRAM(S): 100 INJECTION SUBCUTANEOUS at 12:58

## 2020-02-16 RX ADMIN — Medication 100 MILLIEQUIVALENT(S): at 18:03

## 2020-02-16 RX ADMIN — POTASSIUM PHOSPHATE, MONOBASIC POTASSIUM PHOSPHATE, DIBASIC 62.5 MILLIMOLE(S): 236; 224 INJECTION, SOLUTION INTRAVENOUS at 13:57

## 2020-02-16 RX ADMIN — Medication 100 GRAM(S): at 13:00

## 2020-02-16 RX ADMIN — Medication 100 MILLIEQUIVALENT(S): at 13:06

## 2020-02-16 RX ADMIN — OXYCODONE HYDROCHLORIDE 5 MILLIGRAM(S): 5 TABLET ORAL at 14:25

## 2020-02-16 RX ADMIN — OXYCODONE HYDROCHLORIDE 5 MILLIGRAM(S): 5 TABLET ORAL at 15:12

## 2020-02-16 RX ADMIN — HYDROMORPHONE HYDROCHLORIDE 1 MILLIGRAM(S): 2 INJECTION INTRAMUSCULAR; INTRAVENOUS; SUBCUTANEOUS at 11:52

## 2020-02-16 RX ADMIN — HYDROMORPHONE HYDROCHLORIDE 1 MILLIGRAM(S): 2 INJECTION INTRAMUSCULAR; INTRAVENOUS; SUBCUTANEOUS at 11:27

## 2020-02-16 RX ADMIN — HYDROMORPHONE HYDROCHLORIDE 1 MILLIGRAM(S): 2 INJECTION INTRAMUSCULAR; INTRAVENOUS; SUBCUTANEOUS at 04:56

## 2020-02-16 RX ADMIN — OXYCODONE HYDROCHLORIDE 10 MILLIGRAM(S): 5 TABLET ORAL at 19:31

## 2020-02-16 RX ADMIN — Medication 100 MILLIEQUIVALENT(S): at 15:34

## 2020-02-16 RX ADMIN — SODIUM CHLORIDE 3 MILLILITER(S): 9 INJECTION INTRAMUSCULAR; INTRAVENOUS; SUBCUTANEOUS at 13:14

## 2020-02-17 ENCOUNTER — TRANSCRIPTION ENCOUNTER (OUTPATIENT)
Age: 40
End: 2020-02-17

## 2020-02-17 VITALS
SYSTOLIC BLOOD PRESSURE: 123 MMHG | DIASTOLIC BLOOD PRESSURE: 70 MMHG | TEMPERATURE: 99 F | HEART RATE: 103 BPM | OXYGEN SATURATION: 100 % | RESPIRATION RATE: 16 BRPM

## 2020-02-17 LAB
ANION GAP SERPL CALC-SCNC: 14 MMO/L — SIGNIFICANT CHANGE UP (ref 7–14)
BUN SERPL-MCNC: 7 MG/DL — SIGNIFICANT CHANGE UP (ref 7–23)
CALCIUM SERPL-MCNC: 9.2 MG/DL — SIGNIFICANT CHANGE UP (ref 8.4–10.5)
CHLORIDE SERPL-SCNC: 99 MMOL/L — SIGNIFICANT CHANGE UP (ref 98–107)
CO2 SERPL-SCNC: 22 MMOL/L — SIGNIFICANT CHANGE UP (ref 22–31)
CREAT SERPL-MCNC: 0.43 MG/DL — LOW (ref 0.5–1.3)
GLUCOSE SERPL-MCNC: 105 MG/DL — HIGH (ref 70–99)
HCT VFR BLD CALC: 34.1 % — LOW (ref 34.5–45)
HGB BLD-MCNC: 10.5 G/DL — LOW (ref 11.5–15.5)
MAGNESIUM SERPL-MCNC: 1.9 MG/DL — SIGNIFICANT CHANGE UP (ref 1.6–2.6)
MCHC RBC-ENTMCNC: 29.5 PG — SIGNIFICANT CHANGE UP (ref 27–34)
MCHC RBC-ENTMCNC: 30.8 % — LOW (ref 32–36)
MCV RBC AUTO: 95.8 FL — SIGNIFICANT CHANGE UP (ref 80–100)
NRBC # FLD: 0.05 K/UL — SIGNIFICANT CHANGE UP (ref 0–0)
PHOSPHATE SERPL-MCNC: 3.1 MG/DL — SIGNIFICANT CHANGE UP (ref 2.5–4.5)
PLATELET # BLD AUTO: 400 K/UL — SIGNIFICANT CHANGE UP (ref 150–400)
PMV BLD: 10.7 FL — SIGNIFICANT CHANGE UP (ref 7–13)
POTASSIUM SERPL-MCNC: 3.5 MMOL/L — SIGNIFICANT CHANGE UP (ref 3.5–5.3)
POTASSIUM SERPL-SCNC: 3.5 MMOL/L — SIGNIFICANT CHANGE UP (ref 3.5–5.3)
RBC # BLD: 3.56 M/UL — LOW (ref 3.8–5.2)
RBC # FLD: 22.1 % — HIGH (ref 10.3–14.5)
SODIUM SERPL-SCNC: 135 MMOL/L — SIGNIFICANT CHANGE UP (ref 135–145)
WBC # BLD: 21.85 K/UL — HIGH (ref 3.8–10.5)
WBC # FLD AUTO: 21.85 K/UL — HIGH (ref 3.8–10.5)

## 2020-02-17 RX ORDER — OXYCODONE HYDROCHLORIDE 5 MG/1
1 TABLET ORAL
Qty: 0 | Refills: 0 | DISCHARGE
Start: 2020-02-17

## 2020-02-17 RX ORDER — ACETAMINOPHEN 500 MG
650 TABLET ORAL EVERY 6 HOURS
Refills: 0 | Status: DISCONTINUED | OUTPATIENT
Start: 2020-02-17 | End: 2020-02-17

## 2020-02-17 RX ORDER — ACETAMINOPHEN 500 MG
2 TABLET ORAL
Qty: 0 | Refills: 0 | DISCHARGE
Start: 2020-02-17

## 2020-02-17 RX ORDER — IBUPROFEN 200 MG
1 TABLET ORAL
Qty: 0 | Refills: 0 | DISCHARGE
Start: 2020-02-17

## 2020-02-17 RX ORDER — OXYCODONE HYDROCHLORIDE 5 MG/1
1 TABLET ORAL
Qty: 12 | Refills: 0
Start: 2020-02-17

## 2020-02-17 RX ORDER — POTASSIUM PHOSPHATE, MONOBASIC POTASSIUM PHOSPHATE, DIBASIC 236; 224 MG/ML; MG/ML
15 INJECTION, SOLUTION INTRAVENOUS ONCE
Refills: 0 | Status: COMPLETED | OUTPATIENT
Start: 2020-02-17 | End: 2020-02-17

## 2020-02-17 RX ORDER — IBUPROFEN 200 MG
400 TABLET ORAL EVERY 6 HOURS
Refills: 0 | Status: DISCONTINUED | OUTPATIENT
Start: 2020-02-17 | End: 2020-02-17

## 2020-02-17 RX ORDER — POLYETHYLENE GLYCOL 3350 17 G/17G
17 POWDER, FOR SOLUTION ORAL DAILY
Refills: 0 | Status: DISCONTINUED | OUTPATIENT
Start: 2020-02-17 | End: 2020-02-17

## 2020-02-17 RX ORDER — POLYETHYLENE GLYCOL 3350 17 G/17G
17 POWDER, FOR SOLUTION ORAL
Qty: 0 | Refills: 0 | DISCHARGE
Start: 2020-02-17

## 2020-02-17 RX ORDER — POTASSIUM CHLORIDE 20 MEQ
10 PACKET (EA) ORAL
Refills: 0 | Status: COMPLETED | OUTPATIENT
Start: 2020-02-17 | End: 2020-02-17

## 2020-02-17 RX ADMIN — ENOXAPARIN SODIUM 40 MILLIGRAM(S): 100 INJECTION SUBCUTANEOUS at 12:44

## 2020-02-17 RX ADMIN — OXYCODONE HYDROCHLORIDE 10 MILLIGRAM(S): 5 TABLET ORAL at 09:10

## 2020-02-17 RX ADMIN — OXYCODONE HYDROCHLORIDE 10 MILLIGRAM(S): 5 TABLET ORAL at 01:00

## 2020-02-17 RX ADMIN — OXYCODONE HYDROCHLORIDE 10 MILLIGRAM(S): 5 TABLET ORAL at 14:25

## 2020-02-17 RX ADMIN — OXYCODONE HYDROCHLORIDE 10 MILLIGRAM(S): 5 TABLET ORAL at 15:20

## 2020-02-17 RX ADMIN — SENNA PLUS 1 TABLET(S): 8.6 TABLET ORAL at 00:11

## 2020-02-17 RX ADMIN — POTASSIUM PHOSPHATE, MONOBASIC POTASSIUM PHOSPHATE, DIBASIC 62.5 MILLIMOLE(S): 236; 224 INJECTION, SOLUTION INTRAVENOUS at 13:19

## 2020-02-17 RX ADMIN — SODIUM CHLORIDE 3 MILLILITER(S): 9 INJECTION INTRAMUSCULAR; INTRAVENOUS; SUBCUTANEOUS at 13:20

## 2020-02-17 RX ADMIN — Medication 1 MILLIGRAM(S): at 12:44

## 2020-02-17 RX ADMIN — OXYCODONE HYDROCHLORIDE 10 MILLIGRAM(S): 5 TABLET ORAL at 10:00

## 2020-02-17 RX ADMIN — POLYETHYLENE GLYCOL 3350 17 GRAM(S): 17 POWDER, FOR SOLUTION ORAL at 12:44

## 2020-02-17 RX ADMIN — Medication 100 MILLIEQUIVALENT(S): at 08:25

## 2020-02-17 RX ADMIN — SODIUM CHLORIDE 3 MILLILITER(S): 9 INJECTION INTRAMUSCULAR; INTRAVENOUS; SUBCUTANEOUS at 05:24

## 2020-02-17 RX ADMIN — Medication 100 MILLIEQUIVALENT(S): at 10:14

## 2020-02-17 RX ADMIN — OXYCODONE HYDROCHLORIDE 10 MILLIGRAM(S): 5 TABLET ORAL at 00:11

## 2020-02-17 NOTE — DISCHARGE NOTE PROVIDER - NSDCCPCAREPLAN_GEN_ALL_CORE_FT
PRINCIPAL DISCHARGE DIAGNOSIS  Diagnosis: Hereditary spherocytosis  Assessment and Plan of Treatment:

## 2020-02-17 NOTE — PROGRESS NOTE ADULT - ASSESSMENT
ASSESSMENT:   39F s/p robotic assisted splenectomy for hereditary spherocytosis, recovering well post-operatively.     PLAN:  - Diet: Regular   - Pain control PRN  - DVT ppx: Lovenox  - OOB and ambulating as tolerated  - F/u AM labs    Further plans pending AM rounds and attending discussion.    D- Team Surgery   [id number] ASSESSMENT:   39F s/p robotic assisted splenectomy for hereditary spherocytosis, recovering well post-operatively.     PLAN:  - Diet: Regular   - Pain control PRN  - DVT ppx: Lovenox  - OOB and ambulating as tolerated  - F/u AM labs  - Dispo: today is continues to tolerate diet      D- Team Surgery   [id number]

## 2020-02-17 NOTE — DISCHARGE NOTE NURSING/CASE MANAGEMENT/SOCIAL WORK - NSDCPNINST_GEN_ALL_CORE
Follow up with physician visits as stated. Report to Emergency Room for any signs of infection, fever, chills or shortness of breath.

## 2020-02-17 NOTE — DISCHARGE NOTE PROVIDER - CARE PROVIDER_API CALL
Adi Colmenares (MD)  Surgery  [address] [place] [address]  Phone: [phone]  Fax: [phone]  Follow Up Time: 2 weeks

## 2020-02-17 NOTE — PROGRESS NOTE ADULT - SUBJECTIVE AND OBJECTIVE BOX
TEAM D SURGERY PROGRESS NOTE    39yFemale    SUBJECTIVE:  Patient seen and examined at bedside. No acute events overnight. Not complaining of abdominal pain. GI -flatus. -BM.  Tolerated some regular diet. Denies fevers, chills, nausea, vomiting, chest pain, and shortness of breath.     --------------------------------------------------------------------------------------------------  OBJECTIVE:     Physical Exam:  General: AAOx3, NAD, resting comfortably   HEENT: NC/AT  Respiratory: no increased work of breathing   Abdomen: soft, nontender, nondistended, incision c/d/i; MELINDA drain with ss output   Extremities: warm and well perfused  --------------------------------------------------------------------------------------------------  Vital Signs:  Vital Signs Last 24 Hrs  T(C): 37.3 (17 Feb 2020 00:56), Max: 37.4 (16 Feb 2020 19:42)  T(F): 99.2 (17 Feb 2020 00:56), Max: 99.3 (16 Feb 2020 19:42)  HR: 101 (17 Feb 2020 00:56) (101 - 113)  BP: 131/73 (17 Feb 2020 00:56) (117/64 - 131/73)  BP(mean): --  RR: 18 (17 Feb 2020 00:56) (17 - 18)  SpO2: 99% (17 Feb 2020 00:56) (99% - 100%)    --------------------------------------------------------------------------------------------------  Inputs/Outputs:    15 Feb 2020 07:01  -  16 Feb 2020 07:00  --------------------------------------------------------  IN:    lactated ringers.: 1100 mL  Total IN: 1100 mL    OUT:    Bulb: 150 mL    Voided: 3025 mL  Total OUT: 3175 mL    Total NET: -2075 mL      16 Feb 2020 07:01  -  17 Feb 2020 04:19  --------------------------------------------------------  IN:    IV PiggyBack: 650 mL    lactated ringers.: 200 mL    Oral Fluid: 400 mL  Total IN: 1250 mL    OUT:    Bulb: 190 mL    Voided: 2250 mL  Total OUT: 2440 mL    Total NET: -1190 mL        --------------------------------------------------------------------------------------------------  Laboratories:                        10.0   27.46 )-----------( 345      ( 16 Feb 2020 06:30 )             32.2         16 Feb 2020 06:30    135    |  98     |  6      ----------------------------<  109    3.4     |  24     |  0.49     Ca    9.2        16 Feb 2020 06:30  Phos  2.8       16 Feb 2020 06:30  Mg     2.0       16 Feb 2020 06:30      --------------------------------------------------------------------------------------------------  Medications:  MEDICATIONS  (STANDING):  acetaminophen  IVPB .. 1000 milliGRAM(s) IV Intermittent every 6 hours  enoxaparin Injectable 40 milliGRAM(s) SubCutaneous daily  folic acid 1 milliGRAM(s) Oral daily  senna 1 Tablet(s) Oral at bedtime  sodium chloride 0.9% lock flush 3 milliLiter(s) IV Push every 8 hours    MEDICATIONS  (PRN):  ondansetron Injectable 4 milliGRAM(s) IV Push once PRN Nausea and/or Vomiting  oxyCODONE    IR 5 milliGRAM(s) Oral every 4 hours PRN Moderate Pain (4 - 6)  oxyCODONE    IR 10 milliGRAM(s) Oral every 4 hours PRN Severe Pain (7 - 10)

## 2020-02-17 NOTE — DISCHARGE NOTE NURSING/CASE MANAGEMENT/SOCIAL WORK - PATIENT PORTAL LINK FT
You can access the FollowMyHealth Patient Portal offered by [facility] by registering at the following website: [email or web address]. By joining [facility]’s FollowMyHealth portal, you will also be able to view your health information using other applications (apps) compatible with our system.

## 2020-02-17 NOTE — DISCHARGE NOTE PROVIDER - NSDCFUADDINST_GEN_ALL_CORE_FT
PAIN: You may continue to take Tylenol and Ibuprofen (Advil, Motrin) over the counter for pain.     WOUND CARE:  You should allow warm soapy water to run down the wound in the shower. Pat area dry. You do not need to scrub the area.    Please empty MELINDA drain and record output daily. Please bring MELINDA drain output record to your follow-up appointment    BATHING: You may shower/sponge bathe 48 hours after your surgery date. Please do not soak or submerge the wound in water (bath, swimming) for 14 days after your surgery.    ACTIVITY: No heavy lifting, straining, or vigorous activity until your follow-up appointment in 2 weeks.     DIET: You may resume your regular diet.     NOTIFY YOUR US IF: You have any bleeding that does not stop, any pus draining from his/her wound(s), any fever (over 101 F) or chills, persistent nausea/vomiting, or pain not controlled on discharge pain medications.    FOLLOW-UP: Please call the office and make an appointment to follow up with Dr. Colmenares in 2 weeks.  Please follow up with your primary care physician in 1-2 weeks regarding your hospitalization.

## 2020-02-17 NOTE — DISCHARGE NOTE PROVIDER - HOSPITAL COURSE
39 year-old female with hereditary spherocytosis, history of anemia requiring transfusions, spenomegaly, and cholecystitis s/p laparoscopic cholecystectomy underwent robotic splenectomy on 2/14. She tolerated the procedure and received a tap block for pain without any complication. Postoperatively, she was started on a clear liquid diet and medication for pain. She was subsequently advanced to a regular diet.         On the day of discharge, she has been hemodynamically stable, tolerating a regular diet, making adequate urine, and ambulating. Her pain is well-tolerated with oral medications. She received MELINDA teaching. Patient feels comfortable going home with a MELINDA drain. She is instructed to follow up with Dr. Colmenares within 2 weeks of discharge.

## 2020-02-17 NOTE — DISCHARGE NOTE PROVIDER - NSDCMRMEDTOKEN_GEN_ALL_CORE_FT
folic acid 1 mg oral tablet: 1 tab(s) orally once a day acetaminophen 325 mg oral tablet: 2 tab(s) orally every 6 hours  folic acid 1 mg oral tablet: 1 tab(s) orally once a day  ibuprofen 400 mg oral tablet: 1 tab(s) orally every 6 hours  oxyCODONE 5 mg oral tablet: 1 tab(s) orally every 6 hours MDD:4  oxyCODONE 5 mg oral tablet: 1 tab(s) orally every 4 hours, As needed, Moderate Pain (4 - 6)  polyethylene glycol 3350 oral powder for reconstitution: 17 gram(s) orally once a day

## 2020-02-19 DIAGNOSIS — Z71.89 OTHER SPECIFIED COUNSELING: ICD-10-CM

## 2020-02-21 LAB — SURGICAL PATHOLOGY STUDY: SIGNIFICANT CHANGE UP

## 2020-02-27 ENCOUNTER — APPOINTMENT (OUTPATIENT)
Dept: SURGICAL ONCOLOGY | Facility: CLINIC | Age: 40
End: 2020-02-27
Payer: MEDICAID

## 2020-02-27 VITALS
DIASTOLIC BLOOD PRESSURE: 63 MMHG | OXYGEN SATURATION: 100 % | BODY MASS INDEX: 23.05 KG/M2 | HEIGHT: 64 IN | HEART RATE: 84 BPM | SYSTOLIC BLOOD PRESSURE: 109 MMHG | RESPIRATION RATE: 18 BRPM | WEIGHT: 135 LBS

## 2020-02-27 PROCEDURE — 99024 POSTOP FOLLOW-UP VISIT: CPT

## 2020-02-27 NOTE — REASON FOR VISIT
[de-identified] : robotic-assisted splenectomy and distal pancreatectomy [de-identified] : 2 [de-identified] : 2/14/20

## 2020-02-27 NOTE — HISTORY OF PRESENT ILLNESS
[FreeTextEntry1] : Ms. Bal reports for 2 week post-op visit following robotic splenectomy for hereditary spherocytosis. 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 She reports recovering well and denies fevers/chills. Her pain is well controlled. She reports constipation. The drain has been putting out 30cc/day of clear yellow fluid for the last several days.

## 2020-02-27 NOTE — H&P PST ADULT - NSICDXFAMILYHX_GEN_ALL_CORE_FT
Pulmonary Function Test  Performed by: Miranda Koehler APRN  Authorized by: Miranda Koehler APRN      Pre Drug    FVC: 74%   FEV1: 71%   FEF 25-75%: 59%   FEV1/FVC: 76.14%   T%   RV: 91%   DLCO: 70%   D/VAsb: 96%     Post Drug    FVC: 79%   FEV1: 75%   FEF 25-75%: 58%   FEV1/FVC: 74.73%      Change in % (calculated):    FVC: 6   FEV1: 4   FEF 25-75%: -2   FEV1/FVC: -2      
FAMILY HISTORY:  FH: type 2 diabetes mellitus, grandfather    Mother  Still living? Unknown  Family history of hypertension, Age at diagnosis: Age Unknown    Grandparent  Still living? Unknown  Family history of hypertension, Age at diagnosis: Age Unknown  FH: Alzheimers disease, Age at diagnosis: Age Unknown

## 2020-02-27 NOTE — PHYSICAL EXAM
[Normal] : supple, no neck mass and thyroid not enlarged [Normal Supraclavicular Lymph Nodes] : normal supraclavicular lymph nodes [Normal Neck Lymph Nodes] : normal neck lymph nodes  [Normal Axillary Lymph Nodes] : normal axillary lymph nodes [Normal Groin Lymph Nodes] : normal groin lymph nodes [Normal] : oriented to person, place and time, with appropriate affect [de-identified] : incisions clean, dry, intact. drain serous, thin, clear/yellow.

## 2020-02-27 NOTE — ASSESSMENT
[FreeTextEntry1] : 39 year old woman s/p splenectomy for hereditary spherocytosis. Recovering appropriately. Drain removed without incident. Patient given f/u instructions and symptoms that warrant medical attention. Otherwise doing well. Recommend miralax PRN for constipation. Will refer back to Dr. Solorio for f/u as Dr. Solorio has previously seen her in hospital.

## 2020-07-17 ENCOUNTER — OUTPATIENT (OUTPATIENT)
Dept: OUTPATIENT SERVICES | Facility: HOSPITAL | Age: 40
LOS: 1 days | Discharge: HOME | End: 2020-07-17

## 2020-07-17 DIAGNOSIS — Z90.49 ACQUIRED ABSENCE OF OTHER SPECIFIED PARTS OF DIGESTIVE TRACT: Chronic | ICD-10-CM

## 2020-07-17 DIAGNOSIS — Z87.59 PERSONAL HISTORY OF OTHER COMPLICATIONS OF PREGNANCY, CHILDBIRTH AND THE PUERPERIUM: Chronic | ICD-10-CM

## 2020-07-17 DIAGNOSIS — D58.0 HEREDITARY SPHEROCYTOSIS: ICD-10-CM

## 2020-07-17 DIAGNOSIS — Z98.890 OTHER SPECIFIED POSTPROCEDURAL STATES: Chronic | ICD-10-CM

## 2020-07-20 ENCOUNTER — APPOINTMENT (OUTPATIENT)
Dept: HEMATOLOGY ONCOLOGY | Facility: CLINIC | Age: 40
End: 2020-07-20
Payer: MEDICAID

## 2020-07-20 PROCEDURE — 99215 OFFICE O/P EST HI 40 MIN: CPT | Mod: 95

## 2020-07-20 RX ORDER — PNEUMOCOCCAL VACCINE POLYVALENT 25; 25; 25; 25; 25; 25; 25; 25; 25; 25; 25; 25; 25; 25; 25; 25; 25; 25; 25; 25; 25; 25; 25 UG/.5ML; UG/.5ML; UG/.5ML; UG/.5ML; UG/.5ML; UG/.5ML; UG/.5ML; UG/.5ML; UG/.5ML; UG/.5ML; UG/.5ML; UG/.5ML; UG/.5ML; UG/.5ML; UG/.5ML; UG/.5ML; UG/.5ML; UG/.5ML; UG/.5ML; UG/.5ML; UG/.5ML; UG/.5ML; UG/.5ML
25 INJECTION, SOLUTION INTRAMUSCULAR; SUBCUTANEOUS
Qty: 1 | Refills: 0 | Status: DISCONTINUED | COMMUNITY
Start: 2020-01-23 | End: 2020-07-20

## 2020-07-20 RX ORDER — NEISSERIA MENINGITIDIS GROUP A CAPSULAR POLYSACCHARIDE DIPHTHERIA TOXOID CONJUGATE ANTIGEN, NEISSERIA MENINGITIDIS GROUP C CAPSULAR POLYSACCHARIDE DIPHTHERIA TOXOID CONJUGATE ANTIGEN, NEISSERIA MENINGITIDIS GROUP Y CAPSULAR POLYSACCHARIDE DIPHTHERIA TOXOID CONJUGATE ANTIGEN, AND NEISSERIA MENINGITIDIS GROUP W-135 CAPSULAR POLYSACCHARIDE DIPHTHERIA TOXOID CONJUGATE ANTIGEN 4; 4; 4; 4 UG/.5ML; UG/.5ML; UG/.5ML; UG/.5ML
INJECTION, SOLUTION INTRAMUSCULAR
Qty: 1 | Refills: 0 | Status: DISCONTINUED | COMMUNITY
Start: 2020-01-23 | End: 2020-07-20

## 2020-07-20 RX ORDER — METRONIDAZOLE 7.5 MG/G
0.75 GEL VAGINAL
Qty: 1 | Refills: 0 | Status: DISCONTINUED | COMMUNITY
Start: 2018-10-30 | End: 2020-07-20

## 2020-07-20 RX ORDER — HAEMOPHILUS B POLYSACCHARIDE CONJUGATE VACCINE FOR INJ 10 MCG/0.5
RECON SOLN INTRAMUSCULAR
Qty: 1 | Refills: 0 | Status: DISCONTINUED | COMMUNITY
Start: 2020-01-23 | End: 2020-07-20

## 2020-07-20 NOTE — RESULTS/DATA
[FreeTextEntry1] : 12/5/2015\par Hb 9.0\par Hct 24.6\par \par 11/6/2015\par TBili 20.5\par Indirect bili 19.9

## 2020-07-20 NOTE — HISTORY OF PRESENT ILLNESS
[Home] : at home, [unfilled] , at the time of the visit. [Medical Office: ([facility])___] : at the medical office located in  [Verbal consent obtained from patient] : the patient, [unfilled] [de-identified] : Patient today reports that she feels very well. She has noticed every once in a while she will get a mild level of icterus in her eyes, but definitely less severe than previous. She reports no abdominal pain or problems at the surgical site. Denies any fevers or shortness of breath. No fatigue.  [de-identified] : 38 y/o F w/ hereditary spherocytosis with an intact spleen until s/p splenectomy in February 2020 here for re-establishment of care, last evaluated by Dr. Solorio in 2015. The patient reported that around the age of 10 or 11, she was noted to have yellow eyes and dark urine. She was seen by a pediatrician but does not remember details of that workup. She does remember being told she was anemic, but later on in adulthood she was referred to a hematologist, Dr. Mejia, who performed a work for hemolysis. Pyruvate kinase was normal, G6PD levels were normal, but osmotic fragility test showed increased RBC lysis. The patient was told she had hereditary spherocytosis and a recommendation was made for splenectomy but she hadn't followed up again 2/2 insurance issues.\par \par Jennifer was lost to follow up at that point (she had apparently been seeing hematologist at [place] in that time), but she was experiencing worsening fatigue and scleral icterus, with a baseline bilirubin up to around 20 and baseline hemoglobin around 9.0. She is s/p cholecystectomy in November 2019 due to chronic cholelithiasis, and now is s/p splenectomy with Dr. Colmenares in February 2020. Procedures without complications, and she is presenting now for follow up.

## 2020-07-20 NOTE — ASSESSMENT
[FreeTextEntry1] : 38 y/o F with hereditary spherocytosis, s/p splenectomy early this year (2020) and here to re-establish care. \par \par -Patient with improved scleral icterus and fatigue since procedure. \par -Will recheck patient's CBC with hemolysis markers (LDH, retic count) and CMP to evaluate level of chronic hemolysis. \par -Discussed patient's diagnosis and her possibility of hemolytic episodes and the need for her to follow up regularly. She understands this. \par -RTC in 6 months. \par \par The visit was completed via tele-medicine visit due to the restrictions of the COVID-19 pandemic. All issues as above were discussed and addressed but no physical exam was performed. If it was felt that the patient should be evaluated in the clinic then they were directed there. The patient verbally consented to visit.\par

## 2020-07-20 NOTE — REVIEW OF SYSTEMS
[Fever] : no fever [Chills] : no chills [Palpitations] : no palpitations [Chest Pain] : no chest pain [Fatigue] : no fatigue [Abdominal Pain] : no abdominal pain [Shortness Of Breath] : no shortness of breath [Dysuria] : no dysuria [Joint Stiffness] : no joint stiffness [Joint Pain] : no joint pain [Skin Rash] : no skin rash [Dizziness] : no dizziness [Fainting] : no fainting [Anxiety] : no anxiety [Depression] : no depression [Easy Bleeding] : no tendency for easy bleeding [Easy Bruising] : no tendency for easy bruising [Negative] : Allergic/Immunologic [FreeTextEntry3] : mild scleral yellowing occasionally

## 2020-07-20 NOTE — PHYSICAL EXAM
[Fully active, able to carry on all pre-disease performance without restriction] : Status 0 - Fully active, able to carry on all pre-disease performance without restriction [de-identified] : Cannot perform physical exam due to nature of telemedicine visit, however on telemedicine patient appears to not be in any acute distress

## 2020-07-23 LAB
ALBUMIN SERPL ELPH-MCNC: 4.8 G/DL
ALP BLD-CCNC: 92 U/L
ALT SERPL-CCNC: 11 U/L
ANION GAP SERPL CALC-SCNC: 18 MMOL/L
AST SERPL-CCNC: 16 U/L
BASOPHILS # BLD AUTO: 0.04 K/UL
BASOPHILS NFR BLD AUTO: 0.5 %
BILIRUB SERPL-MCNC: 6.2 MG/DL
BUN SERPL-MCNC: 11 MG/DL
CALCIUM SERPL-MCNC: 9.6 MG/DL
CHLORIDE SERPL-SCNC: 102 MMOL/L
CO2 SERPL-SCNC: 21 MMOL/L
CREAT SERPL-MCNC: 0.68 MG/DL
EOSINOPHIL # BLD AUTO: 0.19 K/UL
EOSINOPHIL NFR BLD AUTO: 2.4 %
FERRITIN SERPL-MCNC: 394 NG/ML
FOLATE SERPL-MCNC: >20 NG/ML
GLUCOSE SERPL-MCNC: 86 MG/DL
HAPTOGLOB SERPL-MCNC: 86 MG/DL
HCT VFR BLD CALC: 40.2 %
HGB BLD-MCNC: 13.9 G/DL
IMM GRANULOCYTES NFR BLD AUTO: 0.3 %
IRON SATN MFR SERPL: 51 %
IRON SERPL-MCNC: 137 UG/DL
LDH SERPL-CCNC: 185 U/L
LYMPHOCYTES # BLD AUTO: 1.34 K/UL
LYMPHOCYTES NFR BLD AUTO: 17 %
MAN DIFF?: NORMAL
MCHC RBC-ENTMCNC: 32.3 PG
MCHC RBC-ENTMCNC: 34.6 GM/DL
MCV RBC AUTO: 93.5 FL
MONOCYTES # BLD AUTO: 1.28 K/UL
MONOCYTES NFR BLD AUTO: 16.3 %
NEUTROPHILS # BLD AUTO: 5 K/UL
NEUTROPHILS NFR BLD AUTO: 63.5 %
PLATELET # BLD AUTO: 420 K/UL
POTASSIUM SERPL-SCNC: 4.2 MMOL/L
PROT SERPL-MCNC: 7.5 G/DL
RBC # BLD: 4.3 M/UL
RBC # BLD: 4.3 M/UL
RBC # FLD: 12.9 %
RETICS # AUTO: 2.4 %
RETICS AGGREG/RBC NFR: 104.9 K/UL
SODIUM SERPL-SCNC: 141 MMOL/L
TIBC SERPL-MCNC: 268 UG/DL
UIBC SERPL-MCNC: 131 UG/DL
VIT B12 SERPL-MCNC: 379 PG/ML
WBC # FLD AUTO: 7.87 K/UL

## 2020-09-15 ENCOUNTER — APPOINTMENT (OUTPATIENT)
Dept: OBGYN | Facility: CLINIC | Age: 40
End: 2020-09-15
Payer: MEDICAID

## 2020-09-15 VITALS
HEIGHT: 64 IN | BODY MASS INDEX: 27.49 KG/M2 | DIASTOLIC BLOOD PRESSURE: 81 MMHG | HEART RATE: 92 BPM | TEMPERATURE: 98.5 F | WEIGHT: 161 LBS | SYSTOLIC BLOOD PRESSURE: 121 MMHG

## 2020-09-15 DIAGNOSIS — Z30.09 ENCOUNTER FOR OTHER GENERAL COUNSELING AND ADVICE ON CONTRACEPTION: ICD-10-CM

## 2020-09-15 PROCEDURE — 99213 OFFICE O/P EST LOW 20 MIN: CPT

## 2020-09-15 NOTE — PLAN
[FreeTextEntry1] : Will contact Dr Goldberg regarding recommendation to start OCP with the h/o hereditary spherocytosis

## 2020-09-15 NOTE — HISTORY OF PRESENT ILLNESS
[FreeTextEntry1] : 39 yo \par hereditary spherocytosis S/P splenectomy\par interested in re-starting OCP. h/o using OCP prior to splenectomy.\par Her experience with Mirena IUD caused increased bleeding througout the cycle.\par \par Domitila is being followed by Dr Bradley Goldberg ([facility])\par

## 2020-11-16 NOTE — PROGRESS NOTE ADULT - PROBLEM SELECTOR PROBLEM 3
Pancreatitis, acute PROBLEM DIAGNOSES  Problem: Hypertrophy of tonsils with hypertrophy of adenoids  Assessment and Plan: Pt is scheduled for tonsillectomy and adenoidectomy, nasal endoscopy possible cautery for epistaxis, frenulectomy on 11/18/2020 with Dr. Gan at [facility]    Problem: Sleep disorder breathing  Assessment and Plan: History of snoring, no PSG done; FLAKO precautions       PROBLEM DIAGNOSES  Problem: Hypertrophy of tonsils with hypertrophy of adenoids  Assessment and Plan: Scheduled for a tonsillectomy and adenoidectomy on 11/18/20 with Dr. Gan at [facility].     Problem: Ankyloglossia  Assessment and Plan: Scheduled for a frenulectomy on 11/18/20 with Dr. Gan at [facility].     Problem: Epistaxis  Assessment and Plan: Scheduled for a nasal endoscopy with possible cautery for epistaxis on 11/18/20 with Dr. Gan at [facility].     Problem: Asthma  Assessment and Plan: Advised to start Albuterol 2 puffs twice daily with spacer two days prior to dos and once in the morning on 11/18/20.        Problem: Sleep disorder breathing  Assessment and Plan: FLAKO precautions       PROBLEM DIAGNOSES  Problem: Hypertrophy of tonsils with hypertrophy of adenoids  Assessment and Plan: Scheduled for a tonsillectomy and adenoidectomy on 11/18/20 with Dr. Gan at [facility].     Problem: Ankyloglossia  Assessment and Plan: Scheduled for a frenulectomy on 11/18/20 with Dr. Gan at [facility].     Problem: Epistaxis  Assessment and Plan: Scheduled for a nasal endoscopy with possible cautery for epistaxis on 11/18/20 with Dr. Gan at [facility].     Problem: Asthma  Assessment and Plan: Advised to start Albuterol 2 puffs twice daily with spacer two days prior to dos and once in the morning on 11/18/20.    Advised mother to start Flovent today as directed with spacer.    Problem: Sleep disorder breathing  Assessment and Plan: FLAKO precautions

## 2020-12-16 PROBLEM — N76.0 BACTERIAL VAGINOSIS: Status: RESOLVED | Noted: 2018-10-30 | Resolved: 2020-12-16

## 2021-01-08 ENCOUNTER — OUTPATIENT (OUTPATIENT)
Dept: OUTPATIENT SERVICES | Facility: HOSPITAL | Age: 41
LOS: 1 days | Discharge: HOME | End: 2021-01-08

## 2021-01-08 DIAGNOSIS — Z98.890 OTHER SPECIFIED POSTPROCEDURAL STATES: Chronic | ICD-10-CM

## 2021-01-08 DIAGNOSIS — Z87.59 PERSONAL HISTORY OF OTHER COMPLICATIONS OF PREGNANCY, CHILDBIRTH AND THE PUERPERIUM: Chronic | ICD-10-CM

## 2021-01-08 DIAGNOSIS — D58.0 HEREDITARY SPHEROCYTOSIS: ICD-10-CM

## 2021-01-08 DIAGNOSIS — Z90.49 ACQUIRED ABSENCE OF OTHER SPECIFIED PARTS OF DIGESTIVE TRACT: Chronic | ICD-10-CM

## 2021-01-12 ENCOUNTER — RESULT REVIEW (OUTPATIENT)
Age: 41
End: 2021-01-12

## 2021-01-12 ENCOUNTER — APPOINTMENT (OUTPATIENT)
Dept: HEMATOLOGY ONCOLOGY | Facility: CLINIC | Age: 41
End: 2021-01-12
Payer: MEDICAID

## 2021-01-12 VITALS
WEIGHT: 164.99 LBS | OXYGEN SATURATION: 100 % | TEMPERATURE: 98 F | SYSTOLIC BLOOD PRESSURE: 128 MMHG | DIASTOLIC BLOOD PRESSURE: 80 MMHG | HEART RATE: 93 BPM | RESPIRATION RATE: 18 BRPM | BODY MASS INDEX: 28.32 KG/M2

## 2021-01-12 LAB
BASOPHILS # BLD AUTO: 0 K/UL — SIGNIFICANT CHANGE UP (ref 0–0.2)
BASOPHILS NFR BLD AUTO: 0 % — SIGNIFICANT CHANGE UP (ref 0–2)
EOSINOPHIL # BLD AUTO: 0.12 K/UL — SIGNIFICANT CHANGE UP (ref 0–0.5)
EOSINOPHIL NFR BLD AUTO: 1 % — SIGNIFICANT CHANGE UP (ref 0–6)
HCT VFR BLD CALC: 41.8 % — SIGNIFICANT CHANGE UP (ref 34.5–45)
HGB BLD-MCNC: 14.6 G/DL — SIGNIFICANT CHANGE UP (ref 11.5–15.5)
LYMPHOCYTES # BLD AUTO: 18 % — SIGNIFICANT CHANGE UP (ref 13–44)
LYMPHOCYTES # BLD AUTO: 2.14 K/UL — SIGNIFICANT CHANGE UP (ref 1–3.3)
MCHC RBC-ENTMCNC: 31.7 PG — SIGNIFICANT CHANGE UP (ref 27–34)
MCHC RBC-ENTMCNC: 34.9 G/DL — SIGNIFICANT CHANGE UP (ref 32–36)
MCV RBC AUTO: 90.7 FL — SIGNIFICANT CHANGE UP (ref 80–100)
MONOCYTES # BLD AUTO: 0.71 K/UL — SIGNIFICANT CHANGE UP (ref 0–0.9)
MONOCYTES NFR BLD AUTO: 6 % — SIGNIFICANT CHANGE UP (ref 2–14)
NEUTROPHILS # BLD AUTO: 8.93 K/UL — HIGH (ref 1.8–7.4)
NEUTROPHILS NFR BLD AUTO: 75 % — SIGNIFICANT CHANGE UP (ref 43–77)
NRBC # BLD: 0 /100 — SIGNIFICANT CHANGE UP (ref 0–0)
NRBC # BLD: SIGNIFICANT CHANGE UP /100 WBCS (ref 0–0)
PLAT MORPH BLD: NORMAL — SIGNIFICANT CHANGE UP
PLATELET # BLD AUTO: 451 K/UL — HIGH (ref 150–400)
RBC # BLD: 4.61 M/UL — SIGNIFICANT CHANGE UP (ref 3.8–5.2)
RBC # FLD: 13 % — SIGNIFICANT CHANGE UP (ref 10.3–14.5)
RBC BLD AUTO: SIGNIFICANT CHANGE UP
RETICS #: 139 K/UL — HIGH (ref 25–125)
RETICS/RBC NFR: 3.1 % — HIGH (ref 0.5–2.5)
WBC # BLD: 11.9 K/UL — HIGH (ref 3.8–10.5)
WBC # FLD AUTO: 11.9 K/UL — HIGH (ref 3.8–10.5)

## 2021-01-12 PROCEDURE — 99072 ADDL SUPL MATRL&STAF TM PHE: CPT

## 2021-01-12 PROCEDURE — 99213 OFFICE O/P EST LOW 20 MIN: CPT

## 2021-01-12 NOTE — REVIEW OF SYSTEMS
[Negative] : Allergic/Immunologic [Fever] : no fever [Chills] : no chills [Fatigue] : no fatigue [Chest Pain] : no chest pain [Palpitations] : no palpitations [Shortness Of Breath] : no shortness of breath [Abdominal Pain] : abdominal pain [Dysuria] : no dysuria [Joint Pain] : no joint pain [Joint Stiffness] : no joint stiffness [Skin Rash] : no skin rash [Dizziness] : no dizziness [Fainting] : no fainting [Anxiety] : no anxiety [Depression] : no depression [Easy Bleeding] : no tendency for easy bleeding [Easy Bruising] : no tendency for easy bruising [FreeTextEntry3] : mild scleral yellowing occasionally

## 2021-01-12 NOTE — HISTORY OF PRESENT ILLNESS
[de-identified] : 40 y/o F w/ hereditary spherocytosis with an intact spleen until s/p splenectomy in February 2020 here for re-establishment of care, last evaluated by Dr. Solorio in 2015. The patient reported that around the age of 10 or 11, she was noted to have yellow eyes and dark urine. She was seen by a pediatrician but does not remember details of that workup. She does remember being told she was anemic, but later on in adulthood she was referred to a hematologist, Dr. Mejia, who performed a work for hemolysis. Pyruvate kinase was normal, G6PD levels were normal, but osmotic fragility test showed increased RBC lysis. The patient was told she had hereditary spherocytosis and a recommendation was made for splenectomy but she hadn't followed up again 2/2 insurance issues.\par \par Jennifer was lost to follow up at that point (she had apparently been seeing hematologist at [place] in that time), but she was experiencing worsening fatigue and scleral icterus, with a baseline bilirubin up to around 20 and baseline hemoglobin around 9.0. She is s/p cholecystectomy in November 2019 due to chronic cholelithiasis, and now is s/p splenectomy with Dr. Colmenares in February 2020. Procedures without complications, and she is presenting now for follow up.  [de-identified] : Patient reporting RUQ abdominal pain, which has gradually occurred since her splenectomy (although her LUQ feels normal). She rates it at a "0.5 out of 10" and said it is not a problem for her.  She has felt more stress lately, and has more responsibility since her surgery. 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 No SOB, no palpitations, no nausea no vomiting. She had questions today about contraception.

## 2021-01-12 NOTE — ASSESSMENT
[FreeTextEntry1] : 39 y/o F with hereditary spherocytosis, s/p splenectomy early this year (2020) and here to re-establish care. \par \par -Patient with improved scleral icterus and fatigue since procedure. \par -Following hemolytic markers. \par -Discussed patient's diagnosis and her possibility of hemolytic episodes and the need for her to follow up regularly. She understands this. \par -Discussed contraception, OCP would be relatively contraindicated due to elevated risk for thrombosis based on multiple studies. Also, patient discussed possibility of future pregnancy and informed that likely would recommend low dose prophylactic lovenox in that scenario. \par -Typically would like to follow her annually, however with rising WBC and plts today will have short interval follow up in 3 months.\par -Patient understands and agrees with plan. All information explained to the best of my ability.\par

## 2021-01-13 LAB
ALBUMIN SERPL ELPH-MCNC: 4.7 G/DL
ALP BLD-CCNC: 93 U/L
ALT SERPL-CCNC: 11 U/L
ANION GAP SERPL CALC-SCNC: 16 MMOL/L
AST SERPL-CCNC: 15 U/L
BILIRUB DIRECT SERPL-MCNC: 0.3 MG/DL
BILIRUB SERPL-MCNC: 2.8 MG/DL
BUN SERPL-MCNC: 11 MG/DL
CALCIUM SERPL-MCNC: 9.5 MG/DL
CHLORIDE SERPL-SCNC: 104 MMOL/L
CO2 SERPL-SCNC: 21 MMOL/L
CREAT SERPL-MCNC: 0.65 MG/DL
FERRITIN SERPL-MCNC: 209 NG/ML
FOLATE SERPL-MCNC: >20 NG/ML
GLUCOSE SERPL-MCNC: 93 MG/DL
HAPTOGLOB SERPL-MCNC: 91 MG/DL
IRON SATN MFR SERPL: 24 %
IRON SERPL-MCNC: 71 UG/DL
LDH SERPL-CCNC: 186 U/L
POTASSIUM SERPL-SCNC: 3.6 MMOL/L
PROT SERPL-MCNC: 7.6 G/DL
SODIUM SERPL-SCNC: 141 MMOL/L
TIBC SERPL-MCNC: 297 UG/DL
UIBC SERPL-MCNC: 225 UG/DL
VIT B12 SERPL-MCNC: 336 PG/ML

## 2021-03-17 ENCOUNTER — APPOINTMENT (OUTPATIENT)
Dept: OBGYN | Facility: CLINIC | Age: 41
End: 2021-03-17
Payer: MEDICAID

## 2021-03-17 VITALS
HEART RATE: 103 BPM | WEIGHT: 170 LBS | BODY MASS INDEX: 29.02 KG/M2 | HEIGHT: 64 IN | DIASTOLIC BLOOD PRESSURE: 72 MMHG | SYSTOLIC BLOOD PRESSURE: 124 MMHG | TEMPERATURE: 97.8 F

## 2021-03-17 PROCEDURE — 99395 PREV VISIT EST AGE 18-39: CPT

## 2021-03-17 PROCEDURE — 99072 ADDL SUPL MATRL&STAF TM PHE: CPT

## 2021-03-17 NOTE — HISTORY OF PRESENT ILLNESS
[Patient reported PAP Smear was normal] : Patient reported PAP Smear was normal [Condoms] : uses condoms [Y] : Patient is sexually active [Monogamous (Male Partner)] : is monogamous with a male partner [Regular Cycle Intervals] : periods have been regular [FreeTextEntry1] : Works as /therapist.\par Had splenectomy\par Would like STI testing\par Patient is thinking of conceiving again and worried since received blood transfusions after both surgeries. reviewed Op notes and explained that high risk of blood loss with splenectomy and reassured since did not have PPh after first delivery. [PapSmeardate] : 2/2020 [PGHxTotal] : 2 [[facility]] : 1 [[facility]] : 1 [PGHxABInduced] : 1

## 2021-03-18 LAB
HBV SURFACE AG SER QL: NONREACTIVE
HCV AB SER QL: NONREACTIVE
HCV S/CO RATIO: 0.09 S/CO
HIV1+2 AB SPEC QL IA.RAPID: NONREACTIVE
T PALLIDUM AB SER QL IA: NEGATIVE

## 2021-03-19 LAB
C TRACH RRNA SPEC QL NAA+PROBE: NOT DETECTED
N GONORRHOEA RRNA SPEC QL NAA+PROBE: NOT DETECTED
SOURCE AMPLIFICATION: NORMAL

## 2021-04-08 ENCOUNTER — OUTPATIENT (OUTPATIENT)
Dept: OUTPATIENT SERVICES | Facility: HOSPITAL | Age: 41
LOS: 1 days | Discharge: HOME | End: 2021-04-08

## 2021-04-08 DIAGNOSIS — Z98.890 OTHER SPECIFIED POSTPROCEDURAL STATES: Chronic | ICD-10-CM

## 2021-04-08 DIAGNOSIS — Z90.49 ACQUIRED ABSENCE OF OTHER SPECIFIED PARTS OF DIGESTIVE TRACT: Chronic | ICD-10-CM

## 2021-04-08 DIAGNOSIS — D58.0 HEREDITARY SPHEROCYTOSIS: ICD-10-CM

## 2021-04-08 DIAGNOSIS — Z87.59 PERSONAL HISTORY OF OTHER COMPLICATIONS OF PREGNANCY, CHILDBIRTH AND THE PUERPERIUM: Chronic | ICD-10-CM

## 2021-04-12 ENCOUNTER — LABORATORY RESULT (OUTPATIENT)
Age: 41
End: 2021-04-12

## 2021-04-12 ENCOUNTER — RESULT REVIEW (OUTPATIENT)
Age: 41
End: 2021-04-12

## 2021-04-12 ENCOUNTER — APPOINTMENT (OUTPATIENT)
Dept: HEMATOLOGY ONCOLOGY | Facility: CLINIC | Age: 41
End: 2021-04-12
Payer: MEDICAID

## 2021-04-12 VITALS
OXYGEN SATURATION: 99 % | RESPIRATION RATE: 16 BRPM | TEMPERATURE: 97.5 F | SYSTOLIC BLOOD PRESSURE: 128 MMHG | DIASTOLIC BLOOD PRESSURE: 78 MMHG | HEART RATE: 91 BPM | BODY MASS INDEX: 29.36 KG/M2 | HEIGHT: 64.37 IN | WEIGHT: 171.96 LBS

## 2021-04-12 LAB
ACANTHOCYTES BLD QL SMEAR: SLIGHT — SIGNIFICANT CHANGE UP
BASOPHILS # BLD AUTO: 0 K/UL — SIGNIFICANT CHANGE UP (ref 0–0.2)
BASOPHILS NFR BLD AUTO: 0 % — SIGNIFICANT CHANGE UP (ref 0–2)
BLASTS # FLD: 1 % — HIGH (ref 0–0)
EOSINOPHIL # BLD AUTO: 0 K/UL — SIGNIFICANT CHANGE UP (ref 0–0.5)
EOSINOPHIL NFR BLD AUTO: 0 % — SIGNIFICANT CHANGE UP (ref 0–6)
HCT VFR BLD CALC: 41.6 % — SIGNIFICANT CHANGE UP (ref 34.5–45)
HGB BLD-MCNC: 14.7 G/DL — SIGNIFICANT CHANGE UP (ref 11.5–15.5)
LYMPHOCYTES # BLD AUTO: 13 % — SIGNIFICANT CHANGE UP (ref 13–44)
LYMPHOCYTES # BLD AUTO: 2.02 K/UL — SIGNIFICANT CHANGE UP (ref 1–3.3)
MCHC RBC-ENTMCNC: 32.1 PG — SIGNIFICANT CHANGE UP (ref 27–34)
MCHC RBC-ENTMCNC: 35.3 G/DL — SIGNIFICANT CHANGE UP (ref 32–36)
MCV RBC AUTO: 90.8 FL — SIGNIFICANT CHANGE UP (ref 80–100)
MONOCYTES # BLD AUTO: 1.09 K/UL — HIGH (ref 0–0.9)
MONOCYTES NFR BLD AUTO: 7 % — SIGNIFICANT CHANGE UP (ref 2–14)
NEUTROPHILS # BLD AUTO: 12.29 K/UL — HIGH (ref 1.8–7.4)
NEUTROPHILS NFR BLD AUTO: 79 % — HIGH (ref 43–77)
NRBC # BLD: 0 /100 — SIGNIFICANT CHANGE UP (ref 0–0)
NRBC # BLD: SIGNIFICANT CHANGE UP /100 WBCS (ref 0–0)
PLAT MORPH BLD: NORMAL — SIGNIFICANT CHANGE UP
PLATELET # BLD AUTO: 463 K/UL — HIGH (ref 150–400)
POIKILOCYTOSIS BLD QL AUTO: SLIGHT — SIGNIFICANT CHANGE UP
RBC # BLD: 4.58 M/UL — SIGNIFICANT CHANGE UP (ref 3.8–5.2)
RBC # FLD: 13.3 % — SIGNIFICANT CHANGE UP (ref 10.3–14.5)
RBC BLD AUTO: ABNORMAL
WBC # BLD: 15.56 K/UL — HIGH (ref 3.8–10.5)
WBC # FLD AUTO: 15.56 K/UL — HIGH (ref 3.8–10.5)

## 2021-04-12 PROCEDURE — 99213 OFFICE O/P EST LOW 20 MIN: CPT

## 2021-04-12 PROCEDURE — 99072 ADDL SUPL MATRL&STAF TM PHE: CPT

## 2021-04-12 NOTE — HISTORY OF PRESENT ILLNESS
[de-identified] : 38 y/o F w/ hereditary spherocytosis with an intact spleen until s/p splenectomy in February 2020 here for re-establishment of care, last evaluated by Dr. Solorio in 2015. The patient reported that around the age of 10 or 11, she was noted to have yellow eyes and dark urine. She was seen by a pediatrician but does not remember details of that workup. She does remember being told she was anemic, but later on in adulthood she was referred to a hematologist, Dr. Mejia, who performed a work for hemolysis. Pyruvate kinase was normal, G6PD levels were normal, but osmotic fragility test showed increased RBC lysis. The patient was told she had hereditary spherocytosis and a recommendation was made for splenectomy but she hadn't followed up again 2/2 insurance issues.\par \par Jennifer was lost to follow up at that point (she had apparently been seeing hematologist at [place] in that time), but she was experiencing worsening fatigue and scleral icterus, with a baseline bilirubin up to around 20 and baseline hemoglobin around 9.0. She is s/p cholecystectomy in November 2019 due to chronic cholelithiasis, and now is s/p splenectomy with Dr. Colmenares in February 2020. Procedures without complications, and she is presenting now for follow up.  [de-identified] : Patient feels well. Concerned about COVID vaccine and going back to work.

## 2021-04-12 NOTE — REVIEW OF SYSTEMS
[Fever] : no fever [Chills] : no chills [Fatigue] : no fatigue [Chest Pain] : no chest pain [Palpitations] : no palpitations [Shortness Of Breath] : no shortness of breath [Abdominal Pain] : abdominal pain [Dysuria] : no dysuria [Joint Pain] : no joint pain [Joint Stiffness] : no joint stiffness [Skin Rash] : no skin rash [Dizziness] : no dizziness [Fainting] : no fainting [Anxiety] : no anxiety [Depression] : no depression [Easy Bleeding] : no tendency for easy bleeding [Easy Bruising] : no tendency for easy bruising [Negative] : Allergic/Immunologic [FreeTextEntry3] : mild scleral yellowing occasionally

## 2021-04-12 NOTE — ASSESSMENT
[FreeTextEntry1] : 41 y/o F with hereditary spherocytosis, s/p splenectomy early this year (2020) and here to re-establish care. \par \par -Patient with improved scleral icterus and fatigue since procedure. \par -Monitoring CBC, haptoglobin, LDH for surveillance for possible hemolytic episodes. \par -Discussed patient's diagnosis and her possibility of hemolytic episodes and the need for her to follow up regularly. She understands this. \par -Discussed contraception in the past, OCP would be relatively contraindicated due to elevated risk for thrombosis based on multiple studies. Also, patient discussed possibility of future pregnancy and informed that likely would recommend low dose prophylactic lovenox in that scenario. \par -WBC and plts persistently elevated likely related to splenectomy. Will monitor q6months for now. \par -Encouraged COVID vaccine.

## 2021-04-22 LAB
ALBUMIN SERPL ELPH-MCNC: 4.7 G/DL
ALP BLD-CCNC: 85 U/L
ALT SERPL-CCNC: 11 U/L
ANION GAP SERPL CALC-SCNC: 13 MMOL/L
AST SERPL-CCNC: 13 U/L
BILIRUB INDIRECT SERPL-MCNC: 3.3 MG/DL
BILIRUB SERPL-MCNC: 3.4 MG/DL
BUN SERPL-MCNC: 9 MG/DL
CALCIUM SERPL-MCNC: 9.7 MG/DL
CHLORIDE SERPL-SCNC: 103 MMOL/L
CO2 SERPL-SCNC: 26 MMOL/L
CREAT SERPL-MCNC: 0.62 MG/DL
FERRITIN SERPL-MCNC: 118 NG/ML
FOLATE SERPL-MCNC: >20 NG/ML
GLUCOSE SERPL-MCNC: 88 MG/DL
HAPTOGLOB SERPL-MCNC: 97 MG/DL
IRON SATN MFR SERPL: 34 %
IRON SERPL-MCNC: 115 UG/DL
LDH SERPL-CCNC: 180 U/L
METHYLMALONATE SERPL-SCNC: 72 NMOL/L
POTASSIUM SERPL-SCNC: 3.9 MMOL/L
PROT SERPL-MCNC: 7.6 G/DL
SODIUM SERPL-SCNC: 142 MMOL/L
TIBC SERPL-MCNC: 334 UG/DL
UIBC SERPL-MCNC: 219 UG/DL
VIT B12 SERPL-MCNC: 391 PG/ML

## 2021-07-13 ENCOUNTER — APPOINTMENT (OUTPATIENT)
Dept: DISASTER EMERGENCY | Facility: OTHER | Age: 41
End: 2021-07-13
Payer: MEDICAID

## 2021-07-13 PROCEDURE — 0001A: CPT

## 2021-08-03 ENCOUNTER — APPOINTMENT (OUTPATIENT)
Dept: DISASTER EMERGENCY | Facility: OTHER | Age: 41
End: 2021-08-03

## 2021-08-07 ENCOUNTER — APPOINTMENT (OUTPATIENT)
Dept: DISASTER EMERGENCY | Facility: OTHER | Age: 41
End: 2021-08-07
Payer: MEDICAID

## 2021-08-07 PROCEDURE — 0002A: CPT

## 2021-10-18 ENCOUNTER — OUTPATIENT (OUTPATIENT)
Dept: OUTPATIENT SERVICES | Facility: HOSPITAL | Age: 41
LOS: 1 days | Discharge: HOME | End: 2021-10-18

## 2021-10-18 DIAGNOSIS — Z98.890 OTHER SPECIFIED POSTPROCEDURAL STATES: Chronic | ICD-10-CM

## 2021-10-18 DIAGNOSIS — Z87.59 PERSONAL HISTORY OF OTHER COMPLICATIONS OF PREGNANCY, CHILDBIRTH AND THE PUERPERIUM: Chronic | ICD-10-CM

## 2021-10-18 DIAGNOSIS — Z90.49 ACQUIRED ABSENCE OF OTHER SPECIFIED PARTS OF DIGESTIVE TRACT: Chronic | ICD-10-CM

## 2021-10-18 DIAGNOSIS — D58.0 HEREDITARY SPHEROCYTOSIS: ICD-10-CM

## 2021-10-19 ENCOUNTER — APPOINTMENT (OUTPATIENT)
Dept: HEMATOLOGY ONCOLOGY | Facility: CLINIC | Age: 41
End: 2021-10-19
Payer: MEDICAID

## 2021-10-19 ENCOUNTER — RESULT REVIEW (OUTPATIENT)
Age: 41
End: 2021-10-19

## 2021-10-19 VITALS
BODY MASS INDEX: 31.09 KG/M2 | OXYGEN SATURATION: 100 % | TEMPERATURE: 97.2 F | SYSTOLIC BLOOD PRESSURE: 127 MMHG | HEIGHT: 63.94 IN | RESPIRATION RATE: 16 BRPM | DIASTOLIC BLOOD PRESSURE: 79 MMHG | WEIGHT: 179.9 LBS | HEART RATE: 72 BPM

## 2021-10-19 LAB
BASOPHILS # BLD AUTO: 0.05 K/UL — SIGNIFICANT CHANGE UP (ref 0–0.2)
BASOPHILS NFR BLD AUTO: 0.4 % — SIGNIFICANT CHANGE UP (ref 0–2)
EOSINOPHIL # BLD AUTO: 0.21 K/UL — SIGNIFICANT CHANGE UP (ref 0–0.5)
EOSINOPHIL NFR BLD AUTO: 1.9 % — SIGNIFICANT CHANGE UP (ref 0–6)
HCT VFR BLD CALC: 42.3 % — SIGNIFICANT CHANGE UP (ref 34.5–45)
HGB BLD-MCNC: 14.9 G/DL — SIGNIFICANT CHANGE UP (ref 11.5–15.5)
IMM GRANULOCYTES NFR BLD AUTO: 0.4 % — SIGNIFICANT CHANGE UP (ref 0–1.5)
LYMPHOCYTES # BLD AUTO: 1.35 K/UL — SIGNIFICANT CHANGE UP (ref 1–3.3)
LYMPHOCYTES # BLD AUTO: 12.1 % — LOW (ref 13–44)
MCHC RBC-ENTMCNC: 31.1 PG — SIGNIFICANT CHANGE UP (ref 27–34)
MCHC RBC-ENTMCNC: 35.2 G/DL — SIGNIFICANT CHANGE UP (ref 32–36)
MCV RBC AUTO: 88.3 FL — SIGNIFICANT CHANGE UP (ref 80–100)
MONOCYTES # BLD AUTO: 1.03 K/UL — HIGH (ref 0–0.9)
MONOCYTES NFR BLD AUTO: 9.3 % — SIGNIFICANT CHANGE UP (ref 2–14)
NEUTROPHILS # BLD AUTO: 8.44 K/UL — HIGH (ref 1.8–7.4)
NEUTROPHILS NFR BLD AUTO: 75.9 % — SIGNIFICANT CHANGE UP (ref 43–77)
NRBC # BLD: 0 /100 WBCS — SIGNIFICANT CHANGE UP (ref 0–0)
PLATELET # BLD AUTO: 551 K/UL — HIGH (ref 150–400)
RBC # BLD: 4.79 M/UL — SIGNIFICANT CHANGE UP (ref 3.8–5.2)
RBC # FLD: 13 % — SIGNIFICANT CHANGE UP (ref 10.3–14.5)
RETICS #: 167.5 K/UL — HIGH (ref 25–125)
RETICS/RBC NFR: 3.4 % — HIGH (ref 0.5–2.5)
WBC # BLD: 11.13 K/UL — HIGH (ref 3.8–10.5)
WBC # FLD AUTO: 11.13 K/UL — HIGH (ref 3.8–10.5)

## 2021-10-19 PROCEDURE — 99213 OFFICE O/P EST LOW 20 MIN: CPT

## 2021-10-21 LAB
BILIRUB DIRECT SERPL-MCNC: 0.2 MG/DL
HAPTOGLOB SERPL-MCNC: 115 MG/DL
LDH SERPL-CCNC: 185 U/L

## 2021-11-26 NOTE — REVIEW OF SYSTEMS
[Anxiety] : anxiety [Negative] : Gastrointestinal [Vision Problems] : no vision problems [Dysphagia] : no dysphagia [Nosebleeds] : no nosebleeds [Odynophagia] : no odynophagia [Dysuria] : no dysuria [Incontinence] : no incontinence [Suicidal] : not suicidal [Insomnia] : no insomnia [Depression] : no depression [Hot Flashes] : no hot flashes

## 2021-11-26 NOTE — PHYSICAL EXAM
[Fully active, able to carry on all pre-disease performance without restriction] : Status 0 - Fully active, able to carry on all pre-disease performance without restriction [Normal] : affect appropriate [de-identified] : supple [de-identified] : (+)S1S2 RRR [de-identified] : no edema [de-identified] : no tenderness [de-identified] : warm/dry

## 2021-11-26 NOTE — HISTORY OF PRESENT ILLNESS
[de-identified] : 38 y/o F w/ hereditary spherocytosis with an intact spleen until s/p splenectomy in February 2020 here for re-establishment of care, last evaluated by Dr. Solorio in 2015. The patient reported that around the age of 10 or 11, she was noted to have yellow eyes and dark urine. She was seen by a pediatrician but does not remember details of that workup. She does remember being told she was anemic, but later on in adulthood she was referred to a hematologist, Dr. Mejia, who performed a work for hemolysis. Pyruvate kinase was normal, G6PD levels were normal, but osmotic fragility test showed increased RBC lysis. The patient was told she had hereditary spherocytosis and a recommendation was made for splenectomy but she hadn't followed up again 2/2 insurance issues.\par \par Jennifer was lost to follow up at that point (she had apparently been seeing hematologist at [place] in that time), but she was experiencing worsening fatigue and scleral icterus, with a baseline bilirubin up to around 20 and baseline hemoglobin around 9.0. She is s/p cholecystectomy in November 2019 due to chronic cholelithiasis, and now is s/p splenectomy with Dr. Colmenares in February 2020. Procedures without complications, and she is presenting now for follow up.  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 Inquiring about Valerian root as she has ten it in the past and it has helped. She is not interested in taking any medication, would like to try an herbal approach. Denies any other complaints and overall feeling well.

## 2021-11-26 NOTE — END OF VISIT
[Time Spent: ___ minutes] : I have spent [unfilled] minutes of time on the encounter. [>50% of the face to face encounter time was spent on counseling and/or coordination of care for ___] : Greater than 50% of the face to face encounter time was spent on counseling and/or coordination of care for [unfilled] [FreeTextEntry3] : Case reviewed with ARON Gallegos. Agree with plan as stated above.\par

## 2022-01-17 NOTE — PATIENT PROFILE ADULT - NSPROMUTPARTICIPCAREFT_GEN_A_NUR
Final Anesthesia Post-op Assessment    Patient: Grzegorz Santos  Procedure(s) Performed: LABOR ANALGESIA  Anesthesia type: L&D Epidural    Vitals Value Taken Time   Temp 36.9 °C (98.4 °F) 01/17/22 0350   Pulse 80 01/17/22 0350   Resp 16 01/17/22 0350   SpO2 100 01/17/22 0534   BP 90/50 01/17/22 0350         Patient Location: Labor and Delivery  Post-op Vital Signs:stable  Level of Consciousness: awake and alert  Respiratory Status: spontaneous ventilation  Cardiovascular stable  Hydration: euvolemic  Pain Management: adequately controlled  Handoff: Handoff to receiving nurse was performed and questions were answered  Vomiting: none  Nausea: None  Airway Patency:patent  Post-op Assessment: no complications and patient tolerated procedure well with no complications      No complications documented.   
collaborative care

## 2022-03-21 ENCOUNTER — APPOINTMENT (OUTPATIENT)
Dept: OBGYN | Facility: CLINIC | Age: 42
End: 2022-03-21
Payer: MEDICAID

## 2022-03-21 VITALS
HEIGHT: 63.94 IN | BODY MASS INDEX: 30.08 KG/M2 | SYSTOLIC BLOOD PRESSURE: 124 MMHG | DIASTOLIC BLOOD PRESSURE: 80 MMHG | WEIGHT: 174 LBS | HEART RATE: 74 BPM

## 2022-03-21 PROCEDURE — 99395 PREV VISIT EST AGE 18-39: CPT

## 2022-03-21 RX ORDER — CHOLECALCIFEROL (VITAMIN D3) 125 MCG
TABLET ORAL
Refills: 0 | Status: ACTIVE | COMMUNITY

## 2022-03-21 RX ORDER — PNV NO.95/FERROUS FUM/FOLIC AC 28MG-0.8MG
TABLET ORAL
Refills: 0 | Status: ACTIVE | COMMUNITY

## 2022-03-21 NOTE — HISTORY OF PRESENT ILLNESS
[N] : Patient is not sexually active [Regular Cycle Intervals] : periods have been regular [FreeTextEntry1] : Patient was last sexually active in August\par Desires STI testing\par patient was tested for BRAC and was negative [PapSmeardate] : 2020 [PGHxTotal] : 2 [[facility] Hospital[facility]ulerm] : 1 [[facility] Hospitaliving] : 1 [PGHxABInduced] : 1

## 2022-03-22 LAB
C TRACH RRNA SPEC QL NAA+PROBE: NOT DETECTED
HBV SURFACE AG SER QL: NONREACTIVE
HCV AB SER QL: NONREACTIVE
HCV S/CO RATIO: 0.17 S/CO
HIV1+2 AB SPEC QL IA.RAPID: NONREACTIVE
HPV HIGH+LOW RISK DNA PNL CVX: NOT DETECTED
N GONORRHOEA RRNA SPEC QL NAA+PROBE: NOT DETECTED
SOURCE AMPLIFICATION: NORMAL
T PALLIDUM AB SER QL IA: NEGATIVE

## 2022-03-29 LAB — CYTOLOGY CVX/VAG DOC THIN PREP: NORMAL

## 2022-03-30 ENCOUNTER — NON-APPOINTMENT (OUTPATIENT)
Age: 42
End: 2022-03-30

## 2022-04-04 ENCOUNTER — OUTPATIENT (OUTPATIENT)
Dept: OUTPATIENT SERVICES | Facility: HOSPITAL | Age: 42
LOS: 1 days | Discharge: HOME | End: 2022-04-04

## 2022-04-04 DIAGNOSIS — Z98.890 OTHER SPECIFIED POSTPROCEDURAL STATES: Chronic | ICD-10-CM

## 2022-04-04 DIAGNOSIS — Z87.59 PERSONAL HISTORY OF OTHER COMPLICATIONS OF PREGNANCY, CHILDBIRTH AND THE PUERPERIUM: Chronic | ICD-10-CM

## 2022-04-04 DIAGNOSIS — Z90.49 ACQUIRED ABSENCE OF OTHER SPECIFIED PARTS OF DIGESTIVE TRACT: Chronic | ICD-10-CM

## 2022-04-04 DIAGNOSIS — D58.0 HEREDITARY SPHEROCYTOSIS: ICD-10-CM

## 2022-04-07 ENCOUNTER — RESULT REVIEW (OUTPATIENT)
Age: 42
End: 2022-04-07

## 2022-04-07 ENCOUNTER — APPOINTMENT (OUTPATIENT)
Dept: HEMATOLOGY ONCOLOGY | Facility: CLINIC | Age: 42
End: 2022-04-07
Payer: MEDICAID

## 2022-04-07 ENCOUNTER — NON-APPOINTMENT (OUTPATIENT)
Age: 42
End: 2022-04-07

## 2022-04-07 VITALS
RESPIRATION RATE: 16 BRPM | TEMPERATURE: 97 F | DIASTOLIC BLOOD PRESSURE: 77 MMHG | SYSTOLIC BLOOD PRESSURE: 154 MMHG | WEIGHT: 181.88 LBS | HEART RATE: 82 BPM | OXYGEN SATURATION: 99 % | BODY MASS INDEX: 31.28 KG/M2

## 2022-04-07 LAB
BASOPHILS # BLD AUTO: 0.1 K/UL — SIGNIFICANT CHANGE UP (ref 0–0.2)
BASOPHILS NFR BLD AUTO: 0.8 % — SIGNIFICANT CHANGE UP (ref 0–2)
EOSINOPHIL # BLD AUTO: 0.16 K/UL — SIGNIFICANT CHANGE UP (ref 0–0.5)
EOSINOPHIL NFR BLD AUTO: 1.3 % — SIGNIFICANT CHANGE UP (ref 0–6)
HCT VFR BLD CALC: 39.7 % — SIGNIFICANT CHANGE UP (ref 34.5–45)
HGB BLD-MCNC: 14 G/DL — SIGNIFICANT CHANGE UP (ref 11.5–15.5)
IMM GRANULOCYTES NFR BLD AUTO: 0.5 % — SIGNIFICANT CHANGE UP (ref 0–1.5)
LYMPHOCYTES # BLD AUTO: 1.93 K/UL — SIGNIFICANT CHANGE UP (ref 1–3.3)
LYMPHOCYTES # BLD AUTO: 15.1 % — SIGNIFICANT CHANGE UP (ref 13–44)
MCHC RBC-ENTMCNC: 31 PG — SIGNIFICANT CHANGE UP (ref 27–34)
MCHC RBC-ENTMCNC: 35.3 G/DL — SIGNIFICANT CHANGE UP (ref 32–36)
MCV RBC AUTO: 87.8 FL — SIGNIFICANT CHANGE UP (ref 80–100)
MONOCYTES # BLD AUTO: 1.34 K/UL — HIGH (ref 0–0.9)
MONOCYTES NFR BLD AUTO: 10.5 % — SIGNIFICANT CHANGE UP (ref 2–14)
NEUTROPHILS # BLD AUTO: 9.16 K/UL — HIGH (ref 1.8–7.4)
NEUTROPHILS NFR BLD AUTO: 71.8 % — SIGNIFICANT CHANGE UP (ref 43–77)
NRBC # BLD: 0 /100 WBCS — SIGNIFICANT CHANGE UP (ref 0–0)
PLATELET # BLD AUTO: 430 K/UL — HIGH (ref 150–400)
RBC # BLD: 4.52 M/UL — SIGNIFICANT CHANGE UP (ref 3.8–5.2)
RBC # FLD: 13.2 % — SIGNIFICANT CHANGE UP (ref 10.3–14.5)
WBC # BLD: 12.75 K/UL — HIGH (ref 3.8–10.5)
WBC # FLD AUTO: 12.75 K/UL — HIGH (ref 3.8–10.5)

## 2022-04-07 PROCEDURE — 99213 OFFICE O/P EST LOW 20 MIN: CPT

## 2022-04-07 NOTE — REVIEW OF SYSTEMS
[Joint Pain] : joint pain [Joint Stiffness] : joint stiffness [Vision Problems] : no vision problems [Dysphagia] : no dysphagia [Nosebleeds] : no nosebleeds [Odynophagia] : no odynophagia [Dysuria] : no dysuria [Incontinence] : no incontinence [Muscle Pain] : no muscle pain [Muscle Weakness] : no muscle weakness [Suicidal] : not suicidal [Insomnia] : no insomnia [Depression] : no depression [Hot Flashes] : no hot flashes [FreeTextEntry9] : per HPI

## 2022-04-07 NOTE — HISTORY OF PRESENT ILLNESS
[de-identified] : 38 y/o F w/ hereditary spherocytosis with an intact spleen until s/p splenectomy in February 2020 here for re-establishment of care, last evaluated by Dr. Solorio in 2015. The patient reported that around the age of 10 or 11, she was noted to have yellow eyes and dark urine. She was seen by a pediatrician but does not remember details of that workup. She does remember being told she was anemic, but later on in adulthood she was referred to a hematologist, Dr. Mejia, who performed a work for hemolysis. Pyruvate kinase was normal, G6PD levels were normal, but osmotic fragility test showed increased RBC lysis. The patient was told she had hereditary spherocytosis and a recommendation was made for splenectomy but she hadn't followed up again 2/2 insurance issues.\par \par Jennifer was lost to follow up at that point (she had apparently been seeing hematologist at [place] in that time), but she was experiencing worsening fatigue and scleral icterus, with a baseline bilirubin up to around 20 and baseline hemoglobin around 9.0. She is s/p cholecystectomy in November 2019 due to chronic cholelithiasis, and now is s/p splenectomy with Dr. Colmenares in February 2020. Procedures without complications, and she is presenting now for follow up.  [de-identified] : Patient is here today for follow up. She has been having ongoing issues with anxiety and stress related to her job. 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 She has been taking Valerian root to help with her anxiety and sleep which has been effective. She has otherwise been feeling well, she had been trying to lose weight with diet and exercise. Due to pain in her hip and occasionally B/L knees she has backed off on running and continues to go to yoga. She typically will have pain when her weight fluctuates upwards and she has had issues with her knees for many years. Saw her PCP in December and started taking weekly Vitamin D and B12 for low levels. Has seasonal allergies and using honey to help with symptoms. Umbilical hernia has been stable. Denies any other complaints.

## 2022-04-07 NOTE — PHYSICAL EXAM
[Obese] : obese [de-identified] : supple [de-identified] : (+)S1S2 RRR [de-identified] : no edema [de-identified] : (+) umbilical hernia [de-identified] : no tenderness [de-identified] : warm/dry

## 2022-04-07 NOTE — ASSESSMENT
[FreeTextEntry1] : 40 y/o F with hereditary spherocytosis, s/p splenectomy early this year (2020) and here for follow up. \par \par -Patient with no further scleral icterus or fatigue since procedure. \par -Monitoring CBC, haptoglobin, LDH for surveillance for possible hemolytic episodes. \par -WBC and plts persistently elevated likely related to splenectomy. Will monitor q 6 months for now\par -Discussed patient's diagnosis and her possibility of hemolytic episodes and the need for her to follow up regularly. She understands this. \par -Previously discussed contraception in the past, OCP would be relatively contraindicated due to elevated risk for thrombosis based on multiple studies. Also, patient discussed possibility of future pregnancy and informed that likely would recommend low dose prophylactic lovenox in that scenario. \par -Discussed use of Valerian root use for anxiety and that there was no literature readily available to assess for side effects and her condition. She has had no negative side effects since starting and would like to continue. She will call the office if she begins to experience any side effects that were discussed in regards to hemolysis.\par -Will refer to our nutritionists to see if there is nutrition/weight loss counseling through the system that she could establish care with.\par -BP elevated today. This is the first high reading documented and may be due to increased stress and anxiety given circumstances with her job that she has been dealing with specifically this morning. She will be following up with her PCP and will have BP take at that time to ensure this was an outlier. \par \par Follow up in 6 months

## 2022-04-13 ENCOUNTER — RESULT REVIEW (OUTPATIENT)
Age: 42
End: 2022-04-13

## 2022-04-13 ENCOUNTER — APPOINTMENT (OUTPATIENT)
Dept: HEMATOLOGY ONCOLOGY | Facility: CLINIC | Age: 42
End: 2022-04-13

## 2022-04-13 LAB
BASOPHILS # BLD AUTO: 0 K/UL — SIGNIFICANT CHANGE UP (ref 0–0.2)
BASOPHILS NFR BLD AUTO: 0 % — SIGNIFICANT CHANGE UP (ref 0–2)
EOSINOPHIL # BLD AUTO: 0.25 K/UL — SIGNIFICANT CHANGE UP (ref 0–0.5)
EOSINOPHIL NFR BLD AUTO: 2 % — SIGNIFICANT CHANGE UP (ref 0–6)
HCT VFR BLD CALC: 40.3 % — SIGNIFICANT CHANGE UP (ref 34.5–45)
HGB BLD-MCNC: 14.2 G/DL — SIGNIFICANT CHANGE UP (ref 11.5–15.5)
LYMPHOCYTES # BLD AUTO: 0.99 K/UL — LOW (ref 1–3.3)
LYMPHOCYTES # BLD AUTO: 8 % — LOW (ref 13–44)
MCHC RBC-ENTMCNC: 31.3 PG — SIGNIFICANT CHANGE UP (ref 27–34)
MCHC RBC-ENTMCNC: 35.2 G/DL — SIGNIFICANT CHANGE UP (ref 32–36)
MCV RBC AUTO: 89 FL — SIGNIFICANT CHANGE UP (ref 80–100)
MONOCYTES # BLD AUTO: 0.99 K/UL — HIGH (ref 0–0.9)
MONOCYTES NFR BLD AUTO: 8 % — SIGNIFICANT CHANGE UP (ref 2–14)
NEUTROPHILS # BLD AUTO: 10.17 K/UL — HIGH (ref 1.8–7.4)
NEUTROPHILS NFR BLD AUTO: 82 % — HIGH (ref 43–77)
NRBC # BLD: 0 /100 — SIGNIFICANT CHANGE UP (ref 0–0)
NRBC # BLD: SIGNIFICANT CHANGE UP /100 WBCS (ref 0–0)
PLAT MORPH BLD: NORMAL — SIGNIFICANT CHANGE UP
PLATELET # BLD AUTO: 478 K/UL — HIGH (ref 150–400)
RBC # BLD: 4.53 M/UL — SIGNIFICANT CHANGE UP (ref 3.8–5.2)
RBC # FLD: 13.3 % — SIGNIFICANT CHANGE UP (ref 10.3–14.5)
RBC BLD AUTO: SIGNIFICANT CHANGE UP
RETICS #: 181.2 K/UL — HIGH (ref 25–125)
RETICS/RBC NFR: 4 % — HIGH (ref 0.5–2.5)
WBC # BLD: 12.4 K/UL — HIGH (ref 3.8–10.5)
WBC # FLD AUTO: 12.4 K/UL — HIGH (ref 3.8–10.5)

## 2022-04-18 LAB
ALBUMIN SERPL ELPH-MCNC: 4.6 G/DL
ALP BLD-CCNC: 74 U/L
ALT SERPL-CCNC: 7 U/L
ANION GAP SERPL CALC-SCNC: 12 MMOL/L
AST SERPL-CCNC: 10 U/L
BILIRUB SERPL-MCNC: 1.8 MG/DL
BUN SERPL-MCNC: 16 MG/DL
CALCIUM SERPL-MCNC: 9.9 MG/DL
CHLORIDE SERPL-SCNC: 105 MMOL/L
CO2 SERPL-SCNC: 23 MMOL/L
CREAT SERPL-MCNC: 0.68 MG/DL
EGFR: 114 ML/MIN/1.73M2
FOLATE SERPL-MCNC: 18 NG/ML
GLUCOSE SERPL-MCNC: 85 MG/DL
HAPTOGLOB SERPL-MCNC: 105 MG/DL
LDH SERPL-CCNC: 174 U/L
POTASSIUM SERPL-SCNC: 4.1 MMOL/L
PROT SERPL-MCNC: 7.5 G/DL
SODIUM SERPL-SCNC: 141 MMOL/L
VIT B12 SERPL-MCNC: 703 PG/ML

## 2022-04-18 NOTE — H&P PST ADULT - TENDERNESS
Detail Level: Simple Quality 130: Documentation Of Current Medications In The Medical Record: Current Medications Documented at biliary drainage site, drainage bag with dark tea color drainage

## 2022-05-05 NOTE — H&P PST ADULT - NSANTHOSAYNRD_GEN_A_CORE
Pt wants to schedule appt with Dr. Donn Lofton. Pt states: pinky on left hand is trigering like crazy.   The shots I received on the right hand worked very well No. YOUSUF screening performed.  STOP BANG Legend: 0-2 = LOW Risk; 3-4 = INTERMEDIATE Risk; 5-8 = HIGH Risk

## 2022-05-06 LAB — METHYLMALONATE SERPL-SCNC: 136 NMOL/L

## 2022-09-13 ENCOUNTER — OUTPATIENT (OUTPATIENT)
Dept: OUTPATIENT SERVICES | Facility: HOSPITAL | Age: 42
LOS: 1 days | End: 2022-09-13
Payer: MEDICAID

## 2022-09-13 ENCOUNTER — RESULT REVIEW (OUTPATIENT)
Age: 42
End: 2022-09-13

## 2022-09-13 ENCOUNTER — APPOINTMENT (OUTPATIENT)
Dept: MAMMOGRAPHY | Facility: IMAGING CENTER | Age: 42
End: 2022-09-13

## 2022-09-13 DIAGNOSIS — Z90.49 ACQUIRED ABSENCE OF OTHER SPECIFIED PARTS OF DIGESTIVE TRACT: Chronic | ICD-10-CM

## 2022-09-13 DIAGNOSIS — Z98.890 OTHER SPECIFIED POSTPROCEDURAL STATES: Chronic | ICD-10-CM

## 2022-09-13 DIAGNOSIS — Z01.419 ENCOUNTER FOR GYNECOLOGICAL EXAMINATION (GENERAL) (ROUTINE) WITHOUT ABNORMAL FINDINGS: ICD-10-CM

## 2022-09-13 DIAGNOSIS — Z87.59 PERSONAL HISTORY OF OTHER COMPLICATIONS OF PREGNANCY, CHILDBIRTH AND THE PUERPERIUM: Chronic | ICD-10-CM

## 2022-09-13 PROCEDURE — 77063 BREAST TOMOSYNTHESIS BI: CPT | Mod: 26

## 2022-09-13 PROCEDURE — 77067 SCR MAMMO BI INCL CAD: CPT

## 2022-09-13 PROCEDURE — 77067 SCR MAMMO BI INCL CAD: CPT | Mod: 26

## 2022-09-13 PROCEDURE — 77063 BREAST TOMOSYNTHESIS BI: CPT

## 2022-09-16 ENCOUNTER — RESULT REVIEW (OUTPATIENT)
Age: 42
End: 2022-09-16

## 2022-09-16 ENCOUNTER — OUTPATIENT (OUTPATIENT)
Dept: OUTPATIENT SERVICES | Facility: HOSPITAL | Age: 42
LOS: 1 days | End: 2022-09-16
Payer: MEDICAID

## 2022-09-16 ENCOUNTER — APPOINTMENT (OUTPATIENT)
Dept: ULTRASOUND IMAGING | Facility: IMAGING CENTER | Age: 42
End: 2022-09-16

## 2022-09-16 DIAGNOSIS — Z98.890 OTHER SPECIFIED POSTPROCEDURAL STATES: Chronic | ICD-10-CM

## 2022-09-16 DIAGNOSIS — Z87.59 PERSONAL HISTORY OF OTHER COMPLICATIONS OF PREGNANCY, CHILDBIRTH AND THE PUERPERIUM: Chronic | ICD-10-CM

## 2022-09-16 DIAGNOSIS — Z00.8 ENCOUNTER FOR OTHER GENERAL EXAMINATION: ICD-10-CM

## 2022-09-16 DIAGNOSIS — Z90.49 ACQUIRED ABSENCE OF OTHER SPECIFIED PARTS OF DIGESTIVE TRACT: Chronic | ICD-10-CM

## 2022-09-16 PROCEDURE — 76642 ULTRASOUND BREAST LIMITED: CPT | Mod: 26,RT

## 2022-09-16 PROCEDURE — 76642 ULTRASOUND BREAST LIMITED: CPT

## 2022-09-26 NOTE — H&P PST ADULT - NEGATIVE RESPIRATORY AND THORAX SYMPTOMS
no hemoptysis/no wheezing/no dyspnea/no cough Erythromycin Pregnancy And Lactation Text: This medication is Pregnancy Category B and is considered safe during pregnancy. It is also excreted in breast milk.

## 2022-10-01 ENCOUNTER — OUTPATIENT (OUTPATIENT)
Dept: OUTPATIENT SERVICES | Facility: HOSPITAL | Age: 42
LOS: 1 days | Discharge: HOME | End: 2022-10-01

## 2022-10-01 DIAGNOSIS — Z98.890 OTHER SPECIFIED POSTPROCEDURAL STATES: Chronic | ICD-10-CM

## 2022-10-01 DIAGNOSIS — Z87.59 PERSONAL HISTORY OF OTHER COMPLICATIONS OF PREGNANCY, CHILDBIRTH AND THE PUERPERIUM: Chronic | ICD-10-CM

## 2022-10-01 DIAGNOSIS — Z90.49 ACQUIRED ABSENCE OF OTHER SPECIFIED PARTS OF DIGESTIVE TRACT: Chronic | ICD-10-CM

## 2022-10-01 DIAGNOSIS — D58.0 HEREDITARY SPHEROCYTOSIS: ICD-10-CM

## 2022-10-06 ENCOUNTER — RESULT REVIEW (OUTPATIENT)
Age: 42
End: 2022-10-06

## 2022-10-06 ENCOUNTER — APPOINTMENT (OUTPATIENT)
Dept: HEMATOLOGY ONCOLOGY | Facility: CLINIC | Age: 42
End: 2022-10-06

## 2022-10-06 VITALS
TEMPERATURE: 97.9 F | HEIGHT: 64.09 IN | OXYGEN SATURATION: 99 % | BODY MASS INDEX: 30.37 KG/M2 | DIASTOLIC BLOOD PRESSURE: 80 MMHG | RESPIRATION RATE: 16 BRPM | HEART RATE: 91 BPM | SYSTOLIC BLOOD PRESSURE: 124 MMHG | WEIGHT: 177.91 LBS

## 2022-10-06 LAB
ACANTHOCYTES BLD QL SMEAR: SIGNIFICANT CHANGE UP
BASOPHILS # BLD AUTO: 0.13 K/UL — SIGNIFICANT CHANGE UP (ref 0–0.2)
BASOPHILS NFR BLD AUTO: 1 % — SIGNIFICANT CHANGE UP (ref 0–2)
EOSINOPHIL # BLD AUTO: 0.39 K/UL — SIGNIFICANT CHANGE UP (ref 0–0.5)
EOSINOPHIL NFR BLD AUTO: 3 % — SIGNIFICANT CHANGE UP (ref 0–6)
HCT VFR BLD CALC: 40.7 % — SIGNIFICANT CHANGE UP (ref 34.5–45)
HGB BLD-MCNC: 14.4 G/DL — SIGNIFICANT CHANGE UP (ref 11.5–15.5)
LYMPHOCYTES # BLD AUTO: 24 % — SIGNIFICANT CHANGE UP (ref 13–44)
LYMPHOCYTES # BLD AUTO: 3.13 K/UL — SIGNIFICANT CHANGE UP (ref 1–3.3)
MCHC RBC-ENTMCNC: 31.3 PG — SIGNIFICANT CHANGE UP (ref 27–34)
MCHC RBC-ENTMCNC: 35.4 G/DL — SIGNIFICANT CHANGE UP (ref 32–36)
MCV RBC AUTO: 88.5 FL — SIGNIFICANT CHANGE UP (ref 80–100)
MONOCYTES # BLD AUTO: 1.56 K/UL — HIGH (ref 0–0.9)
MONOCYTES NFR BLD AUTO: 12 % — SIGNIFICANT CHANGE UP (ref 2–14)
NEUTROPHILS # BLD AUTO: 7.82 K/UL — HIGH (ref 1.8–7.4)
NEUTROPHILS NFR BLD AUTO: 60 % — SIGNIFICANT CHANGE UP (ref 43–77)
NRBC # BLD: 0 /100 — SIGNIFICANT CHANGE UP (ref 0–0)
NRBC # BLD: SIGNIFICANT CHANGE UP /100 WBCS (ref 0–0)
PLAT MORPH BLD: NORMAL — SIGNIFICANT CHANGE UP
PLATELET # BLD AUTO: 486 K/UL — HIGH (ref 150–400)
POIKILOCYTOSIS BLD QL AUTO: SLIGHT — SIGNIFICANT CHANGE UP
RBC # BLD: 4.6 M/UL — SIGNIFICANT CHANGE UP (ref 3.8–5.2)
RBC # FLD: 13.1 % — SIGNIFICANT CHANGE UP (ref 10.3–14.5)
RBC BLD AUTO: ABNORMAL
WBC # BLD: 13.03 K/UL — HIGH (ref 3.8–10.5)
WBC # FLD AUTO: 13.03 K/UL — HIGH (ref 3.8–10.5)

## 2022-10-06 PROCEDURE — 99213 OFFICE O/P EST LOW 20 MIN: CPT

## 2022-10-06 NOTE — HISTORY OF PRESENT ILLNESS
[de-identified] : 38 y/o F w/ hereditary spherocytosis with an intact spleen until s/p splenectomy in February 2020 here for re-establishment of care, last evaluated by Dr. Solorio in 2015. The patient reported that around the age of 10 or 11, she was noted to have yellow eyes and dark urine. She was seen by a pediatrician but does not remember details of that workup. She does remember being told she was anemic, but later on in adulthood she was referred to a hematologist, Dr. Mejia, who performed a work for hemolysis. Pyruvate kinase was normal, G6PD levels were normal, but osmotic fragility test showed increased RBC lysis. The patient was told she had hereditary spherocytosis and a recommendation was made for splenectomy but she hadn't followed up again 2/2 insurance issues.\par \par Jennifer was lost to follow up at that point (she had apparently been seeing hematologist at [place] in that time), but she was experiencing worsening fatigue and scleral icterus, with a baseline bilirubin up to around 20 and baseline hemoglobin around 9.0. She is s/p cholecystectomy in November 2019 due to chronic cholelithiasis, and now is s/p splenectomy with Dr. Colmenares in February 2020. Procedures without complications, and she is presenting now for follow up.  [de-identified] : Patient presented for follow up on 10/06/2022. She felt tired today. No increase in energy since staring B12. She consistently takes folic acid. No fevers or chills. She has night sweats during her menstrual cycles. She has lost 4 lbs since April. No chest pain, palpitations or shortness of breath. She walks twice a day and unable to run due to her knees.

## 2022-10-06 NOTE — PHYSICAL EXAM
[Fully active, able to carry on all pre-disease performance without restriction] : Status 0 - Fully active, able to carry on all pre-disease performance without restriction [Obese] : obese [Normal] : affect appropriate [de-identified] : supple [de-identified] : (+)S1S2 RRR [de-identified] : no edema [de-identified] : (+) umbilical hernia [de-identified] : no tenderness [de-identified] : warm/dry

## 2022-10-06 NOTE — ASSESSMENT
[FreeTextEntry1] : 41 y/o F with hereditary spherocytosis, s/p splenectomy early this year (2020) and here for follow up. \par \par -Patient with no further scleral icterus and only mild fatigue since procedure.\par -Monitoring CBC, haptoglobin, LDH for surveillance for possible hemolytic episodes. \par -WBC and plts persistently elevated likely related to splenectomy. Will monitor q 6 months for now\par -Discussed patient's diagnosis and her possibility of hemolytic episodes and the need for her to follow up regularly. She understands this. \par -Patient understands and agrees with plan. All information explained to the best of my ability. \par Follow up in 6 months

## 2022-10-06 NOTE — RESULTS/DATA
[FreeTextEntry1] : 7/12/22\par WBC 13.3, HgB 14.8, HCT 43.4, MCV 91, Platelets 489k,  ANC 10.29, Folate 17.7

## 2022-10-06 NOTE — REVIEW OF SYSTEMS
[Joint Pain] : joint pain [Joint Stiffness] : joint stiffness [Anxiety] : anxiety [Negative] : Allergic/Immunologic [Vision Problems] : no vision problems [Dysphagia] : no dysphagia [Nosebleeds] : no nosebleeds [Odynophagia] : no odynophagia [Dysuria] : no dysuria [Incontinence] : no incontinence [Muscle Pain] : no muscle pain [Muscle Weakness] : no muscle weakness [Suicidal] : not suicidal [Insomnia] : no insomnia [Depression] : no depression [Hot Flashes] : no hot flashes [FreeTextEntry9] : per HPI

## 2022-10-07 LAB
ALBUMIN SERPL ELPH-MCNC: 4.4 G/DL
ALP BLD-CCNC: 69 U/L
ALT SERPL-CCNC: 10 U/L
ANION GAP SERPL CALC-SCNC: 12 MMOL/L
AST SERPL-CCNC: 16 U/L
BILIRUB SERPL-MCNC: 2.1 MG/DL
BUN SERPL-MCNC: 8 MG/DL
CALCIUM SERPL-MCNC: 9.5 MG/DL
CHLORIDE SERPL-SCNC: 103 MMOL/L
CO2 SERPL-SCNC: 23 MMOL/L
CREAT SERPL-MCNC: 0.67 MG/DL
EGFR: 113 ML/MIN/1.73M2
GLUCOSE SERPL-MCNC: 93 MG/DL
HAPTOGLOB SERPL-MCNC: 94 MG/DL
LDH SERPL-CCNC: 209 U/L
POTASSIUM SERPL-SCNC: 4 MMOL/L
PROT SERPL-MCNC: 7.4 G/DL
SODIUM SERPL-SCNC: 138 MMOL/L

## 2022-10-26 NOTE — ED PROVIDER NOTE - CONTACT TIME
Pediatric Neurology  [facility] [[facility]]    :: For all appointment scheduling needs, and questions or requests for your child's care team ::  [facility] Phone Number:  [phone]     :: For after-hours urgent symptoms ::  On-Call Pediatric Neurology (Page ):  [phone]    :: Medication prescription renewals ::  Please contact your pharmacy first.    Your pharmacy must fax prescription requests to [phone]  Please allow 2-3 days for prescriptions to be authorized    :: Scheduling numbers for common imaging and diagnostic services ::   EEG [phone]  Radiology / Imaging Scheduling (MRI, X-Ray, CT): [phone]      Please consider signing up for [employer] for confidential electronic communication and access to your health records.  Please sign up at the , or go to [email or web address].     VISIT SUMMARY:    It was a pleasure seeing Peter in clinic today!  We discussed continuing his current management and monitoring his developmental progress.    Recommendations:   1) Continue [facility] and PT, OT  2) Continue Genetics/Metabolism work-up  3) Continue with GI and Endocrinology  4) Follow-up in approximately 6 months    Cristal Shepard MD  Pediatric Neurology     05-Sep-2019 07:55

## 2023-02-14 NOTE — ASU PREOP CHECKLIST - INTERNAL PROSTHESES
Consultation - Infectious Disease   Martha [place] 49 y o  male MRN: [id number]  Unit/Bed#: E4 -01 Encounter: [id number]      IMPRESSION & RECOMMENDATIONS:     1  Fever in the setting of recent neutropenia  The patient presented with fever after his chemotherapy infusion with Bleomycin 2/13  The patient was recently neutropenic with an [address] of 80 on 2/11, but he is not neutropenic at this time  He had a similar reaction with fever after his previous infusion of bleomycin on 2/6  Patient recently received Neulasta and white blood cell count is now elevated  He has no localizing symptoms of infection  Tmax was 100 2 in the ED but he is now afebrile  He has a frontal headache but no neck pain or stiffness, no photophobia or other evidence of CNS infection  Chest x-ray without abnormalities, UA unremarkable, COVID/flu/RSV PCR negative, LFTs unremarkable  No signs of infection at his Port-A-Cath site, which was accessed yesterday for chemotherapy    -For now, continue IV cefepime   -Follow-up blood cultures   -Monitor fever curve, white blood cell count, ANC   -If the patient remains afebrile and blood cultures are negative, anticipate stopping antibiotics in the next 24 hours   -Appreciate oncology evaluation   -Consider pretreatment prior to next dose of chemotherapy    2  Stage IV seminoma  Recently started on chemotherapy with Bleomycin, etoposide, cisplatin  Complicated by brief neutropenia as above  Status post Neulasta 2/7 with improvement in white blood cell count    3  Neutropenia  Brief, related to chemotherapy  ANC was 80 on 2/11  Patient now with leukocytosis, likely overcorrection from Neulasta  -Monitor white blood cell count   -Follow-up cultures as above   -Oncology follow-up    4  Obesity  BMI 38    I have discussed the above management plan in detail with the primary service, the patient and his wife  ID will follow      I have performed an extensive review of the medical records in [address] [place] [address] Rd including review of the notes, radiographs, and laboratory results     HISTORY OF PRESENT ILLNESS:  Reason for Consult: neutropenia fever  HPI: Dang Carias is a 61-year-old man with a history of stage IV seminoma who presented to the [address] [place] [address] ED with fever  The patient is undergoing chemotherapy; he recently received a cisplatin, etoposide, bleomycin regimen but for the past 2 weeks has received weekly bleomycin  The patient did well with his first week of chemotherapy when he was receiving triple therapy  His wife states that he was pretreated with multiple medications prior to infusions  After his session on 2/6 he developed a fever about 2 hours after Bleomycin infusion and was evaluated in the ED  He was discharged home as work-up was relatively unremarkable  He was doing well at home, his only symptoms was mild nausea which is normal after chemotherapy  He had no further fevers  The patient received Neulasta on 2/7  He then received another infusion with bleomycin yesterday 2/13, and again developed fevers up to 103 about 2 hours after the infusion  Previous blood work from 2/11 showed an [address] of 80 so he was sent to the ED for evaluation of neutropenic fever  On presentation to the ED, white blood cell count was 6 91  Tmax was 100 2  Chest x-ray showed no acute abnormalities  COVID/flu/RSV PCR were negative and UA was unremarkable  The patient was started on IV cefepime due to neutropenic fever  ID is consulted for further evaluation  The patient is feeling well today other than a mild frontal headache  He denies any neck pain or stiffness  He has no skin rashes or joint pain  He has mild nausea due to chemotherapy  Patient denies abdominal pain, chest pain, shortness of breath, erythema at left chest wall port site, dysuria  He has been working at home while on chemotherapy and has not had any sick contacts      REVIEW OF SYSTEMS:  A complete review of systems is negative other than that noted in the HPI  PAST MEDICAL HISTORY:  Past Medical History:   Diagnosis Date   • Allergic 2000    seasonal allergies   • Impacted cerumen    • Lymphadenopathy    • Obesity, unspecified    • Seasonal allergies    • Seminoma of testis ([facility] [address] )    • Testicular cancer ([facility] [address] ) 2023   • Torn meniscus     right knee- surgical repair today 2021   • Wears glasses      Past Surgical History:   Procedure Laterality Date   • FACIAL/NECK BIOPSY Left 2023    Procedure: EXCISION OF LEFT NECK LYMPH NODE WITH LYMPHOMA PROTOCOL;  Surgeon: Meagan Reynolds MD;  [facility];  Service: Surgical Oncology   • FL GUIDED CENTRAL VENOUS ACCESS DEVICE INSERTION  2023   • HEMORROIDECTOMY     • KNEE SURGERY  2021   • SD ARTHRS KNE SURG W/MENISCECTOMY MED/LAT W/SHVG Right 2021    Procedure: ARTHROSCOPY KNEE, partial lateral meniscectomy;  Surgeon: Michele Nolan MD;  Location: [facility];  Service: Orthopedics   • TUNNELED VENOUS PORT PLACEMENT N/A 2023    Procedure: INSERTION VENOUS PORT (PORT-A-CATH); Surgeon: Meagan Reynolds MD;  Location: [facility];  Service: Surgical Oncology       FAMILY HISTORY:  Non-contributory    SOCIAL HISTORY:  Social History   Social History     Substance and Sexual Activity   Alcohol Use Not Currently   • Alcohol/week: 3 0 standard drinks   • Types: 1 Glasses of wine, 1 Cans of beer, 1 Shots of liquor per week    Comment: 1-2 weekly     Social History     Substance and Sexual Activity   Drug Use Never     Social History     Tobacco Use   Smoking Status Never   Smokeless Tobacco Never       ALLERGIES:  Allergies   Allergen Reactions   • [place] - Food Allergy Vomiting       MEDICATIONS:  All current active medications have been reviewed      PHYSICAL EXAM:  Temp:  [97 6 °F (36 4 °C)-100 2 °F (37 9 °C)] 98 4 °F (36 9 °C)  HR:  [] 77  Resp:  [16-20] 19  BP: (100-123)/(56-71) 123/71  SpO2:  [93 %-99 %] 96 %  Temp (24hrs), Av 7 °F (37 1 °C), Min:97 6 °F (36 4 °C), Max:100 2 °F (37 9 °C)  Current: Temperature: 98 4 °F (36 9 °C)    Intake/Output Summary (Last 24 hours) at 2/14/2023 1625  Last data filed at 2/13/2023 2131  Gross per 24 hour   Intake 1100 ml   Output --   Net 1100 ml       General Appearance:  Appearing well, nontoxic, and in no distress   Head:  Normocephalic, without obvious abnormality, atraumatic   Eyes:  Conjunctiva pink and sclera anicteric, both eyes   Nose: Nares normal, mucosa normal, no drainage   Throat: Oropharynx moist without lesions   Neck: Supple, symmetrical, no adenopathy, no tenderness/mass/nodules   Back:   Symmetric, no curvature, ROM normal, no CVA tenderness   Lungs:   Clear to auscultation bilaterally, respirations unlabored   Chest Wall:  No tenderness or deformity  Left chest wall Port-A-Cath in place without any tenderness or erythema   Heart:  RRR; no murmur, rub or gallop   Abdomen:   Soft, non-tender, non-distended, positive bowel sounds    Extremities: No cyanosis, clubbing or edema   Skin: No rashes or lesions  No draining wounds noted  Lymph nodes: Cervical, supraclavicular nodes normal   Neurologic: Alert and oriented times 3, extremity strength 5/5 and symmetric       LABS, IMAGING, & OTHER STUDIES:  Lab Results:  I have personally reviewed pertinent labs  Results from last 7 days   Lab Units 02/14/23  0543 02/13/23  1644 02/11/23  0750   WBC Thousand/uL 12 31* 6 91 1 32*   HEMOGLOBIN g/dL 11 6* 13 8 13 4   PLATELETS Thousands/uL 50* 70* 54*     Results from last 7 days   Lab Units 02/14/23  0543 02/13/23  1644   SODIUM mmol/L 138 137   POTASSIUM mmol/L 3 8 3 3*   CHLORIDE mmol/L 106 104   CO2 mmol/L 26 23   BUN mg/dL 18 21   CREATININE mg/dL 1 18 1 17   EGFR ml/min/1 73sq m 69 70   CALCIUM mg/dL 8 3* 9 2   AST U/L 19 29   ALT U/L 16 21   ALK PHOS U/L 64 94     Results from last 7 days   Lab Units 02/13/23  1703 02/13/23  1644   BLOOD CULTURE  Received in Microbiology Lab   Culture in Progress  Received in Microbiology Lab  Culture in Progress  Results from last 7 days   Lab Units 02/14/23  0543 02/13/23  1644   PROCALCITONIN ng/ml 5 21* 1 73*                   Imaging Studies:   I have personally reviewed pertinent imaging study reports and images in PACS        CXR-no acute pulmonary disease no

## 2023-03-30 NOTE — ASU PATIENT PROFILE, ADULT - PAIN CHRONIC, PROFILE
"  Assessment & Plan       ICD-10-CM    1. BCC (basal cell carcinoma), face  C44.310       2. Visit for suture removal  Z48.02       wound care instructions given after suture removed  Watch for reoccurrence  Sunscreen  Follow-up prn                BMI:   Estimated body mass index is 28.46 kg/m  as calculated from the following:    Height as of this encounter: 1.854 m (6' 1\").    Weight as of this encounter: 97.8 kg (215 lb 11.2 oz).           No follow-ups on file.    Ayo Sanchez MD  [facility] Ruiz is a 64 year old, presenting for the following health issues:  Suture Removal    Additional Questions 3/22/2023   Roomed by Ayesha ARAUJO   Accompanied by self self     HPI   Joseph Stanford presents to the clinic today for removal of sutures.  The patient has had the sutures in place for 8 days.  There has been no history of infection or drainage.  4 sutures are seen located on the forehead .  The wound is healing well with no signs of infection.  Tetanus status is up to date.   All sutures were easily removed today.  Routine wound care discussed.  The patient will follow up as needed.      Final Diagnosis   A.  Skin, basal cell carcinoma of forehead, re-excision:  -Rare residual foci of basal cell carcinoma present, one focally involving the deep margin.  -Biopsy site related changes including chronic inflammation and fibroplasia.  -Small incidental dermal nevus.         Review of Systems   CONSTITUTIONAL: NEGATIVE for fever, chills, change in weight      Objective    /84 (BP Location: Right arm, Patient Position: Sitting, Cuff Size: Adult Regular)   Pulse 69   Temp 96.8  F (36  C) (Tympanic)   Ht 1.854 m (6' 1\")   Wt 97.8 kg (215 lb 11.2 oz)   SpO2 98%   BMI 28.46 kg/m    Body mass index is 28.46 kg/m .  Physical Exam   GENERAL: healthy, alert and no distress  SKIN: no suspicious lesions or rashes-- incision healing well. Sutures intact             " no

## 2023-04-05 ENCOUNTER — OUTPATIENT (OUTPATIENT)
Dept: OUTPATIENT SERVICES | Facility: HOSPITAL | Age: 43
LOS: 1 days | Discharge: HOME | End: 2023-04-05

## 2023-04-05 DIAGNOSIS — Z90.49 ACQUIRED ABSENCE OF OTHER SPECIFIED PARTS OF DIGESTIVE TRACT: Chronic | ICD-10-CM

## 2023-04-05 DIAGNOSIS — Z98.890 OTHER SPECIFIED POSTPROCEDURAL STATES: Chronic | ICD-10-CM

## 2023-04-05 DIAGNOSIS — D58.0 HEREDITARY SPHEROCYTOSIS: ICD-10-CM

## 2023-04-05 DIAGNOSIS — Z87.59 PERSONAL HISTORY OF OTHER COMPLICATIONS OF PREGNANCY, CHILDBIRTH AND THE PUERPERIUM: Chronic | ICD-10-CM

## 2023-04-06 ENCOUNTER — RESULT REVIEW (OUTPATIENT)
Age: 43
End: 2023-04-06

## 2023-04-06 ENCOUNTER — APPOINTMENT (OUTPATIENT)
Dept: HEMATOLOGY ONCOLOGY | Facility: CLINIC | Age: 43
End: 2023-04-06

## 2023-04-06 ENCOUNTER — APPOINTMENT (OUTPATIENT)
Dept: HEMATOLOGY ONCOLOGY | Facility: CLINIC | Age: 43
End: 2023-04-06
Payer: MEDICAID

## 2023-04-06 VITALS
DIASTOLIC BLOOD PRESSURE: 77 MMHG | HEIGHT: 64.09 IN | OXYGEN SATURATION: 99 % | RESPIRATION RATE: 16 BRPM | SYSTOLIC BLOOD PRESSURE: 111 MMHG | HEART RATE: 79 BPM | TEMPERATURE: 98 F | BODY MASS INDEX: 31.09 KG/M2 | WEIGHT: 182.1 LBS

## 2023-04-06 LAB
BASOPHILS # BLD AUTO: 0.05 K/UL — SIGNIFICANT CHANGE UP (ref 0–0.2)
BASOPHILS NFR BLD AUTO: 0.6 % — SIGNIFICANT CHANGE UP (ref 0–2)
EOSINOPHIL # BLD AUTO: 0.18 K/UL — SIGNIFICANT CHANGE UP (ref 0–0.5)
EOSINOPHIL NFR BLD AUTO: 2.1 % — SIGNIFICANT CHANGE UP (ref 0–6)
HCT VFR BLD CALC: 38.3 % — SIGNIFICANT CHANGE UP (ref 34.5–45)
HGB BLD-MCNC: 13.5 G/DL — SIGNIFICANT CHANGE UP (ref 11.5–15.5)
IMM GRANULOCYTES NFR BLD AUTO: 0.3 % — SIGNIFICANT CHANGE UP (ref 0–0.9)
LYMPHOCYTES # BLD AUTO: 1.05 K/UL — SIGNIFICANT CHANGE UP (ref 1–3.3)
LYMPHOCYTES # BLD AUTO: 12 % — LOW (ref 13–44)
MCHC RBC-ENTMCNC: 31 PG — SIGNIFICANT CHANGE UP (ref 27–34)
MCHC RBC-ENTMCNC: 35.2 G/DL — SIGNIFICANT CHANGE UP (ref 32–36)
MCV RBC AUTO: 87.8 FL — SIGNIFICANT CHANGE UP (ref 80–100)
MONOCYTES # BLD AUTO: 0.89 K/UL — SIGNIFICANT CHANGE UP (ref 0–0.9)
MONOCYTES NFR BLD AUTO: 10.1 % — SIGNIFICANT CHANGE UP (ref 2–14)
NEUTROPHILS # BLD AUTO: 6.58 K/UL — SIGNIFICANT CHANGE UP (ref 1.8–7.4)
NEUTROPHILS NFR BLD AUTO: 74.9 % — SIGNIFICANT CHANGE UP (ref 43–77)
NRBC # BLD: 0 /100 WBCS — SIGNIFICANT CHANGE UP (ref 0–0)
PLATELET # BLD AUTO: 491 K/UL — HIGH (ref 150–400)
RBC # BLD: 4.36 M/UL — SIGNIFICANT CHANGE UP (ref 3.8–5.2)
RBC # FLD: 12.7 % — SIGNIFICANT CHANGE UP (ref 10.3–14.5)
WBC # BLD: 8.78 K/UL — SIGNIFICANT CHANGE UP (ref 3.8–10.5)
WBC # FLD AUTO: 8.78 K/UL — SIGNIFICANT CHANGE UP (ref 3.8–10.5)

## 2023-04-06 PROCEDURE — 99213 OFFICE O/P EST LOW 20 MIN: CPT

## 2023-04-06 NOTE — ASSESSMENT
[FreeTextEntry1] : 41 y/o F with hereditary spherocytosis, s/p splenectomy in 2020 and here for follow up. \par \par -Patient with no further scleral icterus or mild fatigue since splenectomy.\par -Monitoring CBC, haptoglobin, LDH for surveillance for possible hemolytic episodes. \par -WBC WNL today and plts persistently elevated likely related to splenectomy. Will monitor q 6 months for now.\par -AVANI Sotomayor at PCP in January 3.3. Will follow up CMP and monitor chronic hyperbilirubinemia, no scleral icterus or pain symptoms currently.\par -Discussed patient's diagnosis and her possibility of hemolytic episodes and the need for her to follow up regularly. She understands this. \par -Patient understands and agrees with plan. All information explained to the best of my ability. \par Follow up in 6 months

## 2023-04-06 NOTE — HISTORY OF PRESENT ILLNESS
[de-identified] : 41 y/o F w/ hereditary spherocytosis with an intact spleen until s/p splenectomy in February 2020 here for re-establishment of care, last evaluated by Dr. Solorio in 2015. The patient reported that around the age of 10 or 11, she was noted to have yellow eyes and dark urine. She was seen by a pediatrician but does not remember details of that workup. She does remember being told she was anemic, but later on in adulthood she was referred to a hematologist, Dr. Mejia, who performed a work for hemolysis. Pyruvate kinase was normal, G6PD levels were normal, but osmotic fragility test showed increased RBC lysis. The patient was told she had hereditary spherocytosis and a recommendation was made for splenectomy but she hadn't followed up again 2/2 insurance issues.\par \par Patient was lost to follow up at that point (she had apparently been seeing hematologist at [place] in that time), but she was experiencing worsening fatigue and scleral icterus, with a baseline bilirubin up to around 20 and baseline hemoglobin around 9.0. She is s/p cholecystectomy in November 2019 due to chronic cholelithiasis, and now is s/p splenectomy with Dr. Colmenares in February 2020. Procedures without complications, and she is presenting now for follow up.  [de-identified] : Patient presented for follow up. Overall she is feeling well. She exercises and lifts weights, she is conscious of her history and does not push herself to induce any type of pain. Had GI virus during February school break for 7 days with associated vomiting and diarrhea that was only during the first few days and then she was back in her usual state of health by the following week. She consistently takes folic acid and intermittently takes B12 and weekly Vitamin D. No fevers or chills. She still has night sweats during her menstrual cycles which had been irregular around the winter holidays however is now back on 28 day cycle. She is having less bleeding as well and following up with GYN to check hormone levels next month. No chest pain, palpitations or shortness of breath, scleral icterus, or abdominal pain. \par \par Of note labs reviewed on patients [facility] portal from 1/2022 at PCP and labs WNL with the exception of T. Bili of 3.3

## 2023-04-06 NOTE — PHYSICAL EXAM
[Fully active, able to carry on all pre-disease performance without restriction] : Status 0 - Fully active, able to carry on all pre-disease performance without restriction [Normal] : affect appropriate [de-identified] : BMI 31.17 [de-identified] : supple [de-identified] : (+)S1S2 RRR [de-identified] : no edema [de-identified] : (+) umbilical hernia [de-identified] : no tenderness [de-identified] : warm/dry

## 2023-04-06 NOTE — REVIEW OF SYSTEMS
[Vision Problems] : no vision problems [Dysphagia] : no dysphagia [Nosebleeds] : no nosebleeds [Odynophagia] : no odynophagia [Chest Pain] : no chest pain [Palpitations] : no palpitations [Lower Ext Edema] : no lower extremity edema [Dysuria] : no dysuria [Incontinence] : no incontinence [Hot Flashes] : no hot flashes [Negative] : Psychiatric

## 2023-04-13 LAB
ALBUMIN SERPL ELPH-MCNC: 4.3 G/DL
ALP BLD-CCNC: 70 U/L
ALT SERPL-CCNC: 9 U/L
ANION GAP SERPL CALC-SCNC: 12 MMOL/L
AST SERPL-CCNC: 13 U/L
BILIRUB SERPL-MCNC: 2 MG/DL
BUN SERPL-MCNC: 10 MG/DL
CALCIUM SERPL-MCNC: 9.7 MG/DL
CHLORIDE SERPL-SCNC: 104 MMOL/L
CO2 SERPL-SCNC: 25 MMOL/L
CREAT SERPL-MCNC: 0.69 MG/DL
EGFR: 112 ML/MIN/1.73M2
GLUCOSE SERPL-MCNC: 94 MG/DL
HAPTOGLOB SERPL-MCNC: 78 MG/DL
LDH SERPL-CCNC: 173 U/L
POTASSIUM SERPL-SCNC: 4.3 MMOL/L
PROT SERPL-MCNC: 7.2 G/DL
SODIUM SERPL-SCNC: 140 MMOL/L

## 2023-04-19 NOTE — DISCHARGE NOTE NURSING/CASE MANAGEMENT/SOCIAL WORK - PATIENT PORTAL LINK FT
You can access the FollowMyHealth Patient Portal offered by [facility] by registering at the following website: [email or web address]. By joining [facility]’s FollowMyHealth portal, you will also be able to view your health information using other applications (apps) compatible with our system.
None

## 2023-05-18 ENCOUNTER — APPOINTMENT (OUTPATIENT)
Dept: OBGYN | Facility: CLINIC | Age: 43
End: 2023-05-18
Payer: MEDICAID

## 2023-05-18 VITALS
BODY MASS INDEX: 30.9 KG/M2 | WEIGHT: 181 LBS | HEART RATE: 87 BPM | HEIGHT: 64 IN | SYSTOLIC BLOOD PRESSURE: 132 MMHG | DIASTOLIC BLOOD PRESSURE: 88 MMHG

## 2023-05-18 DIAGNOSIS — Z82.49 FAMILY HISTORY OF ISCHEMIC HEART DISEASE AND OTHER DISEASES OF THE CIRCULATORY SYSTEM: ICD-10-CM

## 2023-05-18 PROCEDURE — 99396 PREV VISIT EST AGE 40-64: CPT

## 2023-05-19 LAB
C TRACH RRNA SPEC QL NAA+PROBE: NOT DETECTED
HBV SURFACE AG SER QL: NONREACTIVE
HCV AB SER QL: NONREACTIVE
HCV S/CO RATIO: 0.18 S/CO
HIV1+2 AB SPEC QL IA.RAPID: NONREACTIVE
HPV HIGH+LOW RISK DNA PNL CVX: NOT DETECTED
N GONORRHOEA RRNA SPEC QL NAA+PROBE: NOT DETECTED
SOURCE AMPLIFICATION: NORMAL
T PALLIDUM AB SER QL IA: NEGATIVE

## 2023-06-09 NOTE — PROGRESS NOTE ADULT - PROBLEM SELECTOR PLAN 2
Writer conveyed message to patient's mother. Patient's mother verbalized understanding.    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

## 2023-07-01 LAB — CYTOLOGY CVX/VAG DOC THIN PREP: ABNORMAL

## 2023-07-03 NOTE — ASU PATIENT PROFILE, ADULT - NS PRO MODE OF ARRIVAL
ambulatory Azathioprine Pregnancy And Lactation Text: This medication is Pregnancy Category D and isn't considered safe during pregnancy. It is unknown if this medication is excreted in breast milk.

## 2023-08-20 NOTE — DISCHARGE NOTE NURSING/CASE MANAGEMENT/SOCIAL WORK - NSDCDPATPORTLINK_GEN_ALL_CORE
No
You can access the [facility] Patient Portal, offered by [facility], by registering with the following website: [email or web address]

## 2023-09-14 ENCOUNTER — RESULT REVIEW (OUTPATIENT)
Age: 43
End: 2023-09-14

## 2023-09-14 ENCOUNTER — OUTPATIENT (OUTPATIENT)
Dept: OUTPATIENT SERVICES | Facility: HOSPITAL | Age: 43
LOS: 1 days | End: 2023-09-14
Payer: MEDICAID

## 2023-09-14 ENCOUNTER — APPOINTMENT (OUTPATIENT)
Dept: MAMMOGRAPHY | Facility: IMAGING CENTER | Age: 43
End: 2023-09-14
Payer: MEDICAID

## 2023-09-14 ENCOUNTER — APPOINTMENT (OUTPATIENT)
Dept: ULTRASOUND IMAGING | Facility: IMAGING CENTER | Age: 43
End: 2023-09-14
Payer: MEDICAID

## 2023-09-14 DIAGNOSIS — Z90.49 ACQUIRED ABSENCE OF OTHER SPECIFIED PARTS OF DIGESTIVE TRACT: Chronic | ICD-10-CM

## 2023-09-14 DIAGNOSIS — Z98.890 OTHER SPECIFIED POSTPROCEDURAL STATES: Chronic | ICD-10-CM

## 2023-09-14 DIAGNOSIS — Z87.59 PERSONAL HISTORY OF OTHER COMPLICATIONS OF PREGNANCY, CHILDBIRTH AND THE PUERPERIUM: Chronic | ICD-10-CM

## 2023-09-14 DIAGNOSIS — Z01.419 ENCOUNTER FOR GYNECOLOGICAL EXAMINATION (GENERAL) (ROUTINE) WITHOUT ABNORMAL FINDINGS: ICD-10-CM

## 2023-09-14 PROCEDURE — 76641 ULTRASOUND BREAST COMPLETE: CPT

## 2023-09-14 PROCEDURE — 77067 SCR MAMMO BI INCL CAD: CPT | Mod: 26

## 2023-09-14 PROCEDURE — 77063 BREAST TOMOSYNTHESIS BI: CPT | Mod: 26

## 2023-09-14 PROCEDURE — 77067 SCR MAMMO BI INCL CAD: CPT

## 2023-09-14 PROCEDURE — 76641 ULTRASOUND BREAST COMPLETE: CPT | Mod: 26,50

## 2023-09-14 PROCEDURE — 77063 BREAST TOMOSYNTHESIS BI: CPT

## 2023-09-22 ENCOUNTER — OUTPATIENT (OUTPATIENT)
Dept: OUTPATIENT SERVICES | Facility: HOSPITAL | Age: 43
LOS: 1 days | Discharge: HOME | End: 2023-09-22

## 2023-09-22 DIAGNOSIS — Z87.59 PERSONAL HISTORY OF OTHER COMPLICATIONS OF PREGNANCY, CHILDBIRTH AND THE PUERPERIUM: Chronic | ICD-10-CM

## 2023-09-22 DIAGNOSIS — Z98.890 OTHER SPECIFIED POSTPROCEDURAL STATES: Chronic | ICD-10-CM

## 2023-09-22 DIAGNOSIS — Z90.49 ACQUIRED ABSENCE OF OTHER SPECIFIED PARTS OF DIGESTIVE TRACT: Chronic | ICD-10-CM

## 2023-09-22 DIAGNOSIS — D58.0 HEREDITARY SPHEROCYTOSIS: ICD-10-CM

## 2023-10-02 ENCOUNTER — RESULT REVIEW (OUTPATIENT)
Age: 43
End: 2023-10-02

## 2023-10-02 ENCOUNTER — APPOINTMENT (OUTPATIENT)
Dept: HEMATOLOGY ONCOLOGY | Facility: CLINIC | Age: 43
End: 2023-10-02
Payer: MEDICAID

## 2023-10-02 VITALS
OXYGEN SATURATION: 96 % | HEART RATE: 88 BPM | RESPIRATION RATE: 16 BRPM | HEIGHT: 64.02 IN | TEMPERATURE: 97.7 F | BODY MASS INDEX: 30.94 KG/M2 | WEIGHT: 181.22 LBS | SYSTOLIC BLOOD PRESSURE: 147 MMHG | DIASTOLIC BLOOD PRESSURE: 90 MMHG

## 2023-10-02 LAB
ACANTHOCYTES BLD QL SMEAR: SIGNIFICANT CHANGE UP
ALBUMIN SERPL ELPH-MCNC: 4.4 G/DL
ALP BLD-CCNC: 72 U/L
ALT SERPL-CCNC: 11 U/L
ANION GAP SERPL CALC-SCNC: 13 MMOL/L
AST SERPL-CCNC: 14 U/L
BASOPHILS # BLD AUTO: 0 K/UL — SIGNIFICANT CHANGE UP (ref 0–0.2)
BASOPHILS NFR BLD AUTO: 0 % — SIGNIFICANT CHANGE UP (ref 0–2)
BILIRUB SERPL-MCNC: 2.4 MG/DL
BUN SERPL-MCNC: 11 MG/DL
CALCIUM SERPL-MCNC: 9.7 MG/DL
CHLORIDE SERPL-SCNC: 104 MMOL/L
CO2 SERPL-SCNC: 24 MMOL/L
CREAT SERPL-MCNC: 0.66 MG/DL
EGFR: 113 ML/MIN/1.73M2
EOSINOPHIL # BLD AUTO: 0.15 K/UL — SIGNIFICANT CHANGE UP (ref 0–0.5)
EOSINOPHIL NFR BLD AUTO: 1 % — SIGNIFICANT CHANGE UP (ref 0–6)
GLUCOSE SERPL-MCNC: 90 MG/DL
HCT VFR BLD CALC: 38.5 % — SIGNIFICANT CHANGE UP (ref 34.5–45)
HGB BLD-MCNC: 13.8 G/DL — SIGNIFICANT CHANGE UP (ref 11.5–15.5)
LDH SERPL-CCNC: 209 U/L
LYMPHOCYTES # BLD AUTO: 14 % — SIGNIFICANT CHANGE UP (ref 13–44)
LYMPHOCYTES # BLD AUTO: 2.04 K/UL — SIGNIFICANT CHANGE UP (ref 1–3.3)
MCHC RBC-ENTMCNC: 31.3 PG — SIGNIFICANT CHANGE UP (ref 27–34)
MCHC RBC-ENTMCNC: 35.8 G/DL — SIGNIFICANT CHANGE UP (ref 32–36)
MCV RBC AUTO: 87.3 FL — SIGNIFICANT CHANGE UP (ref 80–100)
MONOCYTES # BLD AUTO: 1.9 K/UL — HIGH (ref 0–0.9)
MONOCYTES NFR BLD AUTO: 13 % — SIGNIFICANT CHANGE UP (ref 2–14)
MYELOCYTES NFR BLD: 1 % — HIGH (ref 0–0)
NEUTROPHILS # BLD AUTO: 10.37 K/UL — HIGH (ref 1.8–7.4)
NEUTROPHILS NFR BLD AUTO: 71 % — SIGNIFICANT CHANGE UP (ref 43–77)
NRBC # BLD: 0 /100 — SIGNIFICANT CHANGE UP (ref 0–0)
NRBC # BLD: SIGNIFICANT CHANGE UP /100 WBCS (ref 0–0)
PLAT MORPH BLD: NORMAL — SIGNIFICANT CHANGE UP
PLATELET # BLD AUTO: 454 K/UL — HIGH (ref 150–400)
POIKILOCYTOSIS BLD QL AUTO: SLIGHT — SIGNIFICANT CHANGE UP
POTASSIUM SERPL-SCNC: 4 MMOL/L
PROT SERPL-MCNC: 7.5 G/DL
RBC # BLD: 4.41 M/UL — SIGNIFICANT CHANGE UP (ref 3.8–5.2)
RBC # FLD: 13.2 % — SIGNIFICANT CHANGE UP (ref 10.3–14.5)
RBC BLD AUTO: ABNORMAL
SCHISTOCYTES BLD QL AUTO: SLIGHT — SIGNIFICANT CHANGE UP
SODIUM SERPL-SCNC: 140 MMOL/L
WBC # BLD: 14.6 K/UL — HIGH (ref 3.8–10.5)
WBC # FLD AUTO: 14.6 K/UL — HIGH (ref 3.8–10.5)

## 2023-10-02 PROCEDURE — 99214 OFFICE O/P EST MOD 30 MIN: CPT

## 2023-10-30 ENCOUNTER — APPOINTMENT (OUTPATIENT)
Dept: CARDIOLOGY | Facility: CLINIC | Age: 43
End: 2023-10-30
Payer: MEDICAID

## 2023-10-30 VITALS
RESPIRATION RATE: 16 BRPM | WEIGHT: 182.1 LBS | HEART RATE: 81 BPM | HEIGHT: 64 IN | BODY MASS INDEX: 31.09 KG/M2 | OXYGEN SATURATION: 99 % | TEMPERATURE: 98.1 F | SYSTOLIC BLOOD PRESSURE: 131 MMHG | DIASTOLIC BLOOD PRESSURE: 86 MMHG

## 2023-10-30 DIAGNOSIS — Z87.19 PERSONAL HISTORY OF OTHER DISEASES OF THE DIGESTIVE SYSTEM: ICD-10-CM

## 2023-10-30 DIAGNOSIS — Z87.898 PERSONAL HISTORY OF OTHER SPECIFIED CONDITIONS: ICD-10-CM

## 2023-10-30 DIAGNOSIS — K83.1 OBSTRUCTION OF BILE DUCT: ICD-10-CM

## 2023-10-30 DIAGNOSIS — O92.79 OTHER DISORDERS OF LACTATION: ICD-10-CM

## 2023-10-30 DIAGNOSIS — G89.18 OTHER COMPLICATIONS OF THE PUERPERIUM, NOT ELSEWHERE CLASSIFIED: ICD-10-CM

## 2023-10-30 DIAGNOSIS — R07.9 CHEST PAIN, UNSPECIFIED: ICD-10-CM

## 2023-10-30 PROCEDURE — 93000 ELECTROCARDIOGRAM COMPLETE: CPT

## 2023-10-30 PROCEDURE — 93010 ELECTROCARDIOGRAM REPORT: CPT

## 2023-10-30 PROCEDURE — 99204 OFFICE O/P NEW MOD 45 MIN: CPT | Mod: 25

## 2023-11-16 ENCOUNTER — APPOINTMENT (OUTPATIENT)
Dept: CV DIAGNOSITCS | Facility: HOSPITAL | Age: 43
End: 2023-11-16

## 2023-11-16 ENCOUNTER — OUTPATIENT (OUTPATIENT)
Dept: OUTPATIENT SERVICES | Facility: HOSPITAL | Age: 43
LOS: 1 days | End: 2023-11-16
Payer: MEDICAID

## 2023-11-16 DIAGNOSIS — Z87.59 PERSONAL HISTORY OF OTHER COMPLICATIONS OF PREGNANCY, CHILDBIRTH AND THE PUERPERIUM: Chronic | ICD-10-CM

## 2023-11-16 DIAGNOSIS — Z98.890 OTHER SPECIFIED POSTPROCEDURAL STATES: Chronic | ICD-10-CM

## 2023-11-16 DIAGNOSIS — R07.9 CHEST PAIN, UNSPECIFIED: ICD-10-CM

## 2023-11-16 DIAGNOSIS — Z90.49 ACQUIRED ABSENCE OF OTHER SPECIFIED PARTS OF DIGESTIVE TRACT: Chronic | ICD-10-CM

## 2023-11-16 PROCEDURE — 93306 TTE W/DOPPLER COMPLETE: CPT | Mod: 26

## 2023-11-16 PROCEDURE — 93356 MYOCRD STRAIN IMG SPCKL TRCK: CPT

## 2023-11-28 ENCOUNTER — NON-APPOINTMENT (OUTPATIENT)
Age: 43
End: 2023-11-28

## 2024-01-12 NOTE — H&P PST ADULT - NEGATIVE GASTROINTESTINAL SYMPTOMS
[Takes medication as prescribed] : takes no constipation/no diarrhea/no melena/no nausea/no abdominal pain/no vomiting

## 2024-02-12 DIAGNOSIS — R92.8 OTHER ABNORMAL AND INCONCLUSIVE FINDINGS ON DIAGNOSTIC IMAGING OF BREAST: ICD-10-CM

## 2024-03-13 ENCOUNTER — RESULT REVIEW (OUTPATIENT)
Age: 44
End: 2024-03-13

## 2024-03-13 ENCOUNTER — OUTPATIENT (OUTPATIENT)
Dept: OUTPATIENT SERVICES | Facility: HOSPITAL | Age: 44
LOS: 1 days | End: 2024-03-13
Payer: MEDICAID

## 2024-03-13 ENCOUNTER — APPOINTMENT (OUTPATIENT)
Dept: ULTRASOUND IMAGING | Facility: IMAGING CENTER | Age: 44
End: 2024-03-13
Payer: MEDICAID

## 2024-03-13 DIAGNOSIS — Z98.890 OTHER SPECIFIED POSTPROCEDURAL STATES: Chronic | ICD-10-CM

## 2024-03-13 DIAGNOSIS — Z00.8 ENCOUNTER FOR OTHER GENERAL EXAMINATION: ICD-10-CM

## 2024-03-13 DIAGNOSIS — Z90.49 ACQUIRED ABSENCE OF OTHER SPECIFIED PARTS OF DIGESTIVE TRACT: Chronic | ICD-10-CM

## 2024-03-13 DIAGNOSIS — Z87.59 PERSONAL HISTORY OF OTHER COMPLICATIONS OF PREGNANCY, CHILDBIRTH AND THE PUERPERIUM: Chronic | ICD-10-CM

## 2024-03-13 PROCEDURE — 76641 ULTRASOUND BREAST COMPLETE: CPT | Mod: 26,50

## 2024-03-13 PROCEDURE — 76641 ULTRASOUND BREAST COMPLETE: CPT

## 2024-04-04 ENCOUNTER — OUTPATIENT (OUTPATIENT)
Dept: OUTPATIENT SERVICES | Facility: HOSPITAL | Age: 44
LOS: 1 days | Discharge: HOME | End: 2024-04-04

## 2024-04-04 DIAGNOSIS — Z98.890 OTHER SPECIFIED POSTPROCEDURAL STATES: Chronic | ICD-10-CM

## 2024-04-04 DIAGNOSIS — D58.0 HEREDITARY SPHEROCYTOSIS: ICD-10-CM

## 2024-04-04 DIAGNOSIS — Z90.49 ACQUIRED ABSENCE OF OTHER SPECIFIED PARTS OF DIGESTIVE TRACT: Chronic | ICD-10-CM

## 2024-04-04 DIAGNOSIS — Z87.59 PERSONAL HISTORY OF OTHER COMPLICATIONS OF PREGNANCY, CHILDBIRTH AND THE PUERPERIUM: Chronic | ICD-10-CM

## 2024-04-14 ENCOUNTER — NON-APPOINTMENT (OUTPATIENT)
Age: 44
End: 2024-04-14

## 2024-04-15 ENCOUNTER — RESULT REVIEW (OUTPATIENT)
Age: 44
End: 2024-04-15

## 2024-04-15 ENCOUNTER — APPOINTMENT (OUTPATIENT)
Dept: HEMATOLOGY ONCOLOGY | Facility: CLINIC | Age: 44
End: 2024-04-15
Payer: MEDICAID

## 2024-04-15 VITALS
TEMPERATURE: 98.2 F | HEART RATE: 83 BPM | SYSTOLIC BLOOD PRESSURE: 116 MMHG | BODY MASS INDEX: 31.45 KG/M2 | RESPIRATION RATE: 16 BRPM | WEIGHT: 183.2 LBS | DIASTOLIC BLOOD PRESSURE: 80 MMHG | OXYGEN SATURATION: 100 %

## 2024-04-15 DIAGNOSIS — D58.0 HEREDITARY SPHEROCYTOSIS: ICD-10-CM

## 2024-04-15 LAB
ACANTHOCYTES BLD QL SMEAR: SIGNIFICANT CHANGE UP
BASOPHILS # BLD AUTO: 0.07 K/UL — SIGNIFICANT CHANGE UP (ref 0–0.2)
BASOPHILS NFR BLD AUTO: 0.5 % — SIGNIFICANT CHANGE UP (ref 0–2)
EOSINOPHIL # BLD AUTO: 0.21 K/UL — SIGNIFICANT CHANGE UP (ref 0–0.5)
EOSINOPHIL NFR BLD AUTO: 1.4 % — SIGNIFICANT CHANGE UP (ref 0–6)
HCT VFR BLD CALC: 40.7 % — SIGNIFICANT CHANGE UP (ref 34.5–45)
HGB BLD-MCNC: 14 G/DL — SIGNIFICANT CHANGE UP (ref 11.5–15.5)
IMM GRANULOCYTES NFR BLD AUTO: 0.5 % — SIGNIFICANT CHANGE UP (ref 0–0.9)
LYMPHOCYTES # BLD AUTO: 1.78 K/UL — SIGNIFICANT CHANGE UP (ref 1–3.3)
LYMPHOCYTES # BLD AUTO: 12.2 % — LOW (ref 13–44)
MCHC RBC-ENTMCNC: 29.1 PG — SIGNIFICANT CHANGE UP (ref 27–34)
MCHC RBC-ENTMCNC: 34.4 G/DL — SIGNIFICANT CHANGE UP (ref 32–36)
MCV RBC AUTO: 84.6 FL — SIGNIFICANT CHANGE UP (ref 80–100)
MONOCYTES # BLD AUTO: 1.62 K/UL — HIGH (ref 0–0.9)
MONOCYTES NFR BLD AUTO: 11.1 % — SIGNIFICANT CHANGE UP (ref 2–14)
NEUTROPHILS # BLD AUTO: 10.8 K/UL — HIGH (ref 1.8–7.4)
NEUTROPHILS NFR BLD AUTO: 74.3 % — SIGNIFICANT CHANGE UP (ref 43–77)
NRBC # BLD: 0 /100 WBCS — SIGNIFICANT CHANGE UP (ref 0–0)
PLAT MORPH BLD: NORMAL — SIGNIFICANT CHANGE UP
PLATELET # BLD AUTO: 499 K/UL — HIGH (ref 150–400)
POIKILOCYTOSIS BLD QL AUTO: SLIGHT — SIGNIFICANT CHANGE UP
RBC # BLD: 4.81 M/UL — SIGNIFICANT CHANGE UP (ref 3.8–5.2)
RBC # FLD: 13.1 % — SIGNIFICANT CHANGE UP (ref 10.3–14.5)
RBC BLD AUTO: ABNORMAL
RETICS #: 110 K/UL — SIGNIFICANT CHANGE UP (ref 25–125)
RETICS/RBC NFR: 2.3 % — SIGNIFICANT CHANGE UP (ref 0.5–2.5)
SCHISTOCYTES BLD QL AUTO: SLIGHT — SIGNIFICANT CHANGE UP
WBC # BLD: 14.55 K/UL — HIGH (ref 3.8–10.5)
WBC # FLD AUTO: 14.55 K/UL — HIGH (ref 3.8–10.5)

## 2024-04-15 PROCEDURE — 99213 OFFICE O/P EST LOW 20 MIN: CPT

## 2024-04-15 NOTE — HISTORY OF PRESENT ILLNESS
[de-identified] : 42 y/o F w/ hereditary spherocytosis with an intact spleen until s/p splenectomy in February 2020 re-established care with me in July 2020 after last being evaluated by Dr. Solorio in 2015. The patient reported that around the age of 10 or 11, she was noted to have yellow eyes and dark urine. She was seen by a pediatrician but does not remember details of that workup. She does remember being told she was anemic, but later on in adulthood she was referred to a hematologist, Dr. Mejia, who performed a work for hemolysis. Pyruvate kinase was normal, G6PD levels were normal, but osmotic fragility test showed increased RBC lysis. The patient was told she had hereditary spherocytosis and a recommendation was made for splenectomy but she hadn't followed up again 2/2 insurance issues. Patient was lost to follow up at that point (she had apparently been seeing hematologist at [place] in that time), but she was experiencing worsening fatigue and scleral icterus, with a baseline bilirubin up to around 20 and baseline hemoglobin around 9.0. She is s/p cholecystectomy in November 2019 due to chronic cholelithiasis, and now is s/p splenectomy with Dr. Colmenares in February 2020. Procedures without complications, and hemoglobin much improved since then.  [de-identified] : Patient seen for follow up on 04/15/2024. Patient feeling well outside of allergies. No recent hospitalizations or infections at this time. Denied any abdominal pain. Chest pain has resolved since she has less stress since leaving her previous job.

## 2024-04-15 NOTE — PHYSICAL EXAM
[Fully active, able to carry on all pre-disease performance without restriction] : Status 0 - Fully active, able to carry on all pre-disease performance without restriction [Normal] : affect appropriate [de-identified] : BMI 31.17 [de-identified] : supple [de-identified] : (+)S1S2 RRR [de-identified] : no edema [de-identified] : (+) umbilical hernia [de-identified] : no tenderness [de-identified] : warm/dry

## 2024-04-15 NOTE — ASSESSMENT
[FreeTextEntry1] : 44 y/o F with hereditary spherocytosis, s/p splenectomy in 2020 and here for follow up.   -Patient with no further scleral icterus or mild fatigue since splenectomy. -Monitoring CBC, haptoglobin, LDH for surveillance for possible hemolytic episodes. Stable on today's check.  -WBC persistently elevated today and plts persistently elevated also likely related to splenectomy. No signs or symptoms of infection. Will monitor q 6 months for now. -Will follow up CMP and monitor chronic hyperbilirubinemia, no scleral icterus or pain symptoms currently. -Discussed patient's diagnosis and her possibility of hemolytic episodes and the need for her to follow up regularly. She understands this.  -Allergy symptoms discussed, recommended cetirizine PRN and will follow up with PCP.   -Patient understands and agrees with plan. All information explained to the best of my ability.  Follow up in 6 months

## 2024-04-15 NOTE — REVIEW OF SYSTEMS
[Negative] : Allergic/Immunologic [Vision Problems] : no vision problems [Dysphagia] : no dysphagia [Nosebleeds] : no nosebleeds [Odynophagia] : no odynophagia [Chest Pain] : no chest pain [Palpitations] : no palpitations [Lower Ext Edema] : no lower extremity edema [Dysuria] : no dysuria [Incontinence] : no incontinence [Hot Flashes] : no hot flashes

## 2024-04-17 LAB
ALBUMIN SERPL ELPH-MCNC: 4.5 G/DL
ALP BLD-CCNC: 67 U/L
ALT SERPL-CCNC: 15 U/L
ANION GAP SERPL CALC-SCNC: 13 MMOL/L
AST SERPL-CCNC: 17 U/L
BILIRUB SERPL-MCNC: 1.7 MG/DL
BUN SERPL-MCNC: 9 MG/DL
CALCIUM SERPL-MCNC: 9.5 MG/DL
CHLORIDE SERPL-SCNC: 101 MMOL/L
CO2 SERPL-SCNC: 24 MMOL/L
CREAT SERPL-MCNC: 0.63 MG/DL
EGFR: 114 ML/MIN/1.73M2
GLUCOSE SERPL-MCNC: 88 MG/DL
HAPTOGLOB SERPL-MCNC: 130 MG/DL
LDH SERPL-CCNC: 191 U/L
POTASSIUM SERPL-SCNC: 3.9 MMOL/L
PROT SERPL-MCNC: 7.7 G/DL
SODIUM SERPL-SCNC: 138 MMOL/L

## 2024-05-23 ENCOUNTER — APPOINTMENT (OUTPATIENT)
Dept: OBGYN | Facility: CLINIC | Age: 44
End: 2024-05-23
Payer: MEDICAID

## 2024-05-23 VITALS
HEIGHT: 64 IN | WEIGHT: 186 LBS | DIASTOLIC BLOOD PRESSURE: 77 MMHG | SYSTOLIC BLOOD PRESSURE: 110 MMHG | BODY MASS INDEX: 31.76 KG/M2 | HEART RATE: 78 BPM

## 2024-05-23 DIAGNOSIS — Z01.419 ENCOUNTER FOR GYNECOLOGICAL EXAMINATION (GENERAL) (ROUTINE) W/OUT ABNORMAL FINDINGS: ICD-10-CM

## 2024-05-23 DIAGNOSIS — N60.09 SOLITARY CYST OF UNSPECIFIED BREAST: ICD-10-CM

## 2024-05-23 PROCEDURE — 99396 PREV VISIT EST AGE 40-64: CPT

## 2024-05-23 PROCEDURE — 36415 COLL VENOUS BLD VENIPUNCTURE: CPT

## 2024-05-23 NOTE — HISTORY OF PRESENT ILLNESS
[Condoms] : uses condoms [Monogamous (Male Partner)] : is monogamous with a male partner [Y] : Positive pregnancy history [Normal Amount/Duration] :  normal amount and duration [No] : Patient does not have concerns regarding sex [Mammogramdate] : 09/23 [BreastSonogramDate] : 03/24 [PapSmeardate] : 05/23 [PGHxTotal] : 2 [[facility] Health Care CorporationxFulerm] : 1 [Benson Hospitaliving] : 1 [PGHxABInduced] : 1 [FreeTextEntry1] : 05/14/24

## 2024-05-23 NOTE — PHYSICAL EXAM
[Chaperone Present] : A chaperone was present in the examining room during all aspects of the physical examination [[id number]] : A chaperone was present during the pelvic exam. [Appropriately responsive] : appropriately responsive [Alert] : alert [No Acute Distress] : no acute distress [No Lymphadenopathy] : no lymphadenopathy [Soft] : soft [Non-tender] : non-tender [Non-distended] : non-distended [No HSM] : No HSM [No Lesions] : no lesions [No Mass] : no mass [Oriented x3] : oriented x3 [Examination Of The Breasts] : a normal appearance [No Discharge] : no discharge [No Masses] : no breast masses were palpable [Labia Majora] : normal [Labia Minora] : normal [Normal] : normal [Uterine Adnexae] : normal [FreeTextEntry2] : ANA Cool

## 2024-05-23 NOTE — PLAN
[FreeTextEntry1] : - Referral for mammo given for cystic breasts. - HIV bloodwork done today. - GC done today.  RTO 1 year for annual gyn exam.

## 2024-05-23 NOTE — DISCUSSION/SUMMARY
[FreeTextEntry1] : This note was written by Connie Simon on 05/23/2024  actively solely SILVANO Perez M.D.   All medical record entries made by this scribe at my, SILVANO Armas M.D direction and personally dictated by me on 05/23/2024 . I have personally reviewed the chart and agree that the record reflects my personal performance of the history, physical exam, assessment, and plan.

## 2024-05-27 LAB
C TRACH RRNA SPEC QL NAA+PROBE: NOT DETECTED
HBV SURFACE AG SER QL: NONREACTIVE
HCV AB SER QL: NONREACTIVE
HCV S/CO RATIO: 0.14 S/CO
HIV1+2 AB SPEC QL IA.RAPID: NONREACTIVE
N GONORRHOEA RRNA SPEC QL NAA+PROBE: NOT DETECTED
SOURCE AMPLIFICATION: NORMAL
T PALLIDUM AB SER QL IA: NEGATIVE

## 2024-07-02 ENCOUNTER — APPOINTMENT (OUTPATIENT)
Dept: ULTRASOUND IMAGING | Facility: IMAGING CENTER | Age: 44
End: 2024-07-02

## 2024-07-18 NOTE — CONSULT NOTE ADULT - NSHPATTENDINGPLANDISCUSS_GEN_ALL_CORE
Occupational Therapy   Co-Treatment    Name: Roosevelt Moser  MRN: [id number]  Admitting Diagnosis:  Osteomyelitis of multiple sites       Recommendations:     Discharge Recommendations: Moderate Intensity Therapy  Discharge Equipment Recommendations:  lift device, bedside commode, hospital bed  Barriers to discharge:  None    Assessment:     Roosevelt Moser is a 74 y.o. male with a medical diagnosis of Osteomyelitis of multiple sites.  He continues to be guarded with LUE with poor L shoulder AROM, AAROM and PROM. However today he attempted 1 sit to stand at EOB, with Max encouragement. Pt. Was adamant with no proceeding with another session and continuing with the session. Performance deficits affecting function are weakness, impaired endurance, impaired functional mobility, impaired self care skills, gait instability, impaired balance, decreased lower extremity function, decreased safety awareness, decreased upper extremity function, pain, decreased ROM, impaired skin.     Rehab Prognosis:  Good; patient would benefit from acute skilled OT services to address these deficits and reach maximum level of function.       Plan:     Patient to be seen 4 x/week to address the above listed problems via self-care/home management, therapeutic activities, therapeutic exercises, neuromuscular re-education  Plan of Care Expires: 08/08/24  Plan of Care Reviewed with: patient, spouse    Subjective     Chief Complaint: LUE pain   Patient/Family Comments/goals: Pt.reports oh no, use  (re:  AROM and PROM)  Pain/Comfort:  Pain Rating 1:  (no pain)  Location - Side 1: Left  Location 1: shoulder (with minimal movement)  Pain Addressed 1: Reposition, Distraction  Pain Rating Post-Intervention 1: other (see comments) (not rated)    Objective:     Communicated with: Nurse prior to session.  Patient found HOB elevated with PICC line, Condom Catheter, telemetry upon OT entry to room.    General Precautions: Standard, fall    Orthopedic 
Precautions:LUE partial weight bearing, RLE weight bearing as tolerated  Braces: N/A  Respiratory Status: Nasal cannula, flow 3 L/min, was not donned upon arrival, donned by PT at bed level   - Pt.reports he takes it off when he uses his CPAP     Occupational Performance:     Bed Mobility:    Patient completed Scooting/Bridging with maximal assistance  Patient completed Supine to Sit with maximal assistance and 2 persons  Patient completed Sit to Supine with minimum assistance     Functional Mobility/Transfers:  Pt. Sat at EOB ~ 7-8 mins with CGA - SBA    Patient completed Sit <> Stand Transfer with maximal assistance and of 2 persons  with  no assistive device ,hip clearance achieved however very minimal, with BLE blocking     Activities of Daily Living:    LBD: total A to don sock at bed level  Politely decline all further ADLs    [facility] 6 Click ADL: 10    Treatment & Education:  AROM Elbow flexion and extension LUE 2 reps at bed level   Pt.encouraged to continue AROM of L wrist hand and elbow   Educated on the importance of grooming and hygiene, postioning for skin integrity and maintaining strength in UE to engage in ADLs   Pt educated on role of occupational therapy, POC, and safety during ADLs and functional mobility. Pt and OT discussed importance of safe, continued mobility to optimize daily living skills. Pt verbalized understanding. Pt given instruction to call for medical staff/nurse for assistance.   Co-treatment with PT for maximal pt participation, safety, and activity tolerance     Patient left HOB elevated with all lines intact, call button in reach, bed alarm on, and spouse present    GOALS:   Multidisciplinary Problems       Occupational Therapy Goals          Problem: Occupational Therapy    Goal Priority Disciplines Outcome Interventions   Occupational Therapy Goal     OT, PT/OT Progressing    Description: Goals to be met by: 8/8/24     Patient will increase functional independence with ADLs by 
performing:    LE Dressing with Moderate Assistance.  Grooming while EOB with Supervision.  Toileting from toilet with Supervision for hygiene and clothing management.   Stand pivot transfers with Moderate Assistance.  Squat pivot transfers with Moderate Assistance.  Toilet transfer to bedside commode with Moderate Assistance.    DME Justification for Hospital Bed:  Patient requires a hospital bed for positioning of the body in ways that are not feasible with an ordinary bed. The patient requires special positioning for pain relief, limited mobility, and/or being unable to independently make changes in body position without the use of a hospital bed. Pillows and wedges will not be adequate for resolving these positional issues.                          Time Tracking:     OT Date of Treatment: 07/18/24  OT Start Time: 1154  OT Stop Time: 1214  OT Total Time (min): 20 min    Billable Minutes:Neuromuscular Re-education 10    OT/BAR: OT          7/18/2024  
medicine housestaff
heme team

## 2024-09-04 NOTE — ED ADULT NURSE NOTE - NSSUHOSCREENINGYN_ED_ALL_ED
You can access the FollowMyHealth Patient Portal offered by [facility] by registering at the following website: [email or web address]. By joining [employer]’s FollowMyHealth portal, you will also be able to view your health information using other applications (apps) compatible with our system.
Yes - the patient is able to be screened

## 2024-09-24 ENCOUNTER — OUTPATIENT (OUTPATIENT)
Dept: OUTPATIENT SERVICES | Facility: HOSPITAL | Age: 44
LOS: 1 days | Discharge: HOME | End: 2024-09-24

## 2024-09-24 DIAGNOSIS — Z98.890 OTHER SPECIFIED POSTPROCEDURAL STATES: Chronic | ICD-10-CM

## 2024-09-24 DIAGNOSIS — Z87.59 PERSONAL HISTORY OF OTHER COMPLICATIONS OF PREGNANCY, CHILDBIRTH AND THE PUERPERIUM: Chronic | ICD-10-CM

## 2024-09-24 DIAGNOSIS — Z90.49 ACQUIRED ABSENCE OF OTHER SPECIFIED PARTS OF DIGESTIVE TRACT: Chronic | ICD-10-CM

## 2024-09-24 DIAGNOSIS — D58.0 HEREDITARY SPHEROCYTOSIS: ICD-10-CM

## 2024-10-03 ENCOUNTER — RESULT REVIEW (OUTPATIENT)
Age: 44
End: 2024-10-03

## 2024-10-03 ENCOUNTER — APPOINTMENT (OUTPATIENT)
Dept: HEMATOLOGY ONCOLOGY | Facility: CLINIC | Age: 44
End: 2024-10-03
Payer: COMMERCIAL

## 2024-10-03 VITALS
HEIGHT: 64 IN | TEMPERATURE: 97.4 F | DIASTOLIC BLOOD PRESSURE: 82 MMHG | BODY MASS INDEX: 31.91 KG/M2 | SYSTOLIC BLOOD PRESSURE: 133 MMHG | WEIGHT: 186.93 LBS | HEART RATE: 80 BPM | RESPIRATION RATE: 17 BRPM | OXYGEN SATURATION: 98 %

## 2024-10-03 DIAGNOSIS — D58.0 HEREDITARY SPHEROCYTOSIS: ICD-10-CM

## 2024-10-03 LAB
BASOPHILS # BLD AUTO: 0 K/UL — SIGNIFICANT CHANGE UP (ref 0–0.2)
BASOPHILS NFR BLD AUTO: 0 % — SIGNIFICANT CHANGE UP (ref 0–2)
EOSINOPHIL # BLD AUTO: 0.22 K/UL — SIGNIFICANT CHANGE UP (ref 0–0.5)
EOSINOPHIL NFR BLD AUTO: 2 % — SIGNIFICANT CHANGE UP (ref 0–6)
HCT VFR BLD CALC: 38.9 % — SIGNIFICANT CHANGE UP (ref 34.5–45)
HGB BLD-MCNC: 13.6 G/DL — SIGNIFICANT CHANGE UP (ref 11.5–15.5)
LG PLATELETS BLD QL AUTO: SLIGHT — SIGNIFICANT CHANGE UP
LYMPHOCYTES # BLD AUTO: 1.2 K/UL — SIGNIFICANT CHANGE UP (ref 1–3.3)
LYMPHOCYTES # BLD AUTO: 11 % — LOW (ref 13–44)
MCHC RBC-ENTMCNC: 30.5 PG — SIGNIFICANT CHANGE UP (ref 27–34)
MCV RBC AUTO: 87.2 FL — SIGNIFICANT CHANGE UP (ref 80–100)
MONOCYTES # BLD AUTO: 0.87 K/UL — SIGNIFICANT CHANGE UP (ref 0–0.9)
MONOCYTES NFR BLD AUTO: 8 % — SIGNIFICANT CHANGE UP (ref 2–14)
NEUTROPHILS # BLD AUTO: 8.63 K/UL — HIGH (ref 1.8–7.4)
NEUTROPHILS NFR BLD AUTO: 79 % — HIGH (ref 43–77)
NRBC # BLD: 0 /100 WBCS — SIGNIFICANT CHANGE UP (ref 0–0)
NRBC # BLD: SIGNIFICANT CHANGE UP /100 WBCS (ref 0–0)
NRBC BLD-RTO: 0 /100 WBCS — SIGNIFICANT CHANGE UP (ref 0–0)
PLAT MORPH BLD: ABNORMAL
PLATELET # BLD AUTO: 392 K/UL — SIGNIFICANT CHANGE UP (ref 150–400)
RBC # BLD: 4.46 M/UL — SIGNIFICANT CHANGE UP (ref 3.8–5.2)
RBC # FLD: 13.1 % — SIGNIFICANT CHANGE UP (ref 10.3–14.5)
RETICS #: 149.5 K/UL — HIGH (ref 25–125)
RETICS/RBC NFR: 3.4 % — HIGH (ref 0.5–2.5)
WBC # BLD: 10.93 K/UL — HIGH (ref 3.8–10.5)
WBC # FLD AUTO: 10.93 K/UL — HIGH (ref 3.8–10.5)

## 2024-10-03 PROCEDURE — G2211 COMPLEX E/M VISIT ADD ON: CPT

## 2024-10-03 PROCEDURE — 99214 OFFICE O/P EST MOD 30 MIN: CPT

## 2024-10-03 NOTE — PHYSICAL EXAM
[Fully active, able to carry on all pre-disease performance without restriction] : Status 0 - Fully active, able to carry on all pre-disease performance without restriction [Normal] : affect appropriate [de-identified] : BMI 32.09 [de-identified] : supple [de-identified] : (+)S1S2 RRR [de-identified] : no edema [de-identified] : no tenderness [de-identified] : warm/dry

## 2024-10-03 NOTE — HISTORY OF PRESENT ILLNESS
[de-identified] : On initial presentation this is a 38 y/o F w/ hereditary spherocytosis with an intact spleen until s/p splenectomy in February 2020 re-established care with me in July 2020 after last being evaluated by Dr. Solorio in 2015. The patient reported that around the age of 10 or 11, she was noted to have yellow eyes and dark urine. She was seen by a pediatrician but does not remember details of that workup. She does remember being told she was anemic, but later on in adulthood she was referred to a hematologist, Dr. Mejia, who performed a work for hemolysis. Pyruvate kinase was normal, G6PD levels were normal, but osmotic fragility test showed increased RBC lysis. The patient was told she had hereditary spherocytosis and a recommendation was made for splenectomy but she hadn't followed up again 2/2 insurance issues. Patient was lost to follow up at that point (she had apparently been seeing hematologist at [place] in that time), but she was experiencing worsening fatigue and scleral icterus, with a baseline bilirubin up to around 20 and baseline hemoglobin around 9.0. She is s/p cholecystectomy in November 2019 due to chronic cholelithiasis, and now is s/p splenectomy with Dr. Colmenares in February 2020. Procedures without complications, and hemoglobin much improved since then.  [de-identified] : Patient seen for follow up. Patient feeling well outside of allergies, she was recently checked, and she has an allergy to mold. No recent hospitalizations or infections at this time. Discussed her desire to lose weight as she is very active and has found she has continued to gain weight. Denied any abdominal pain, CP, palpitations, SOB, n/v/d/c, new bruising or bleeding.

## 2024-10-03 NOTE — PHYSICAL EXAM
[Fully active, able to carry on all pre-disease performance without restriction] : Status 0 - Fully active, able to carry on all pre-disease performance without restriction [Normal] : affect appropriate [de-identified] : BMI 32.09 [de-identified] : supple [de-identified] : (+)S1S2 RRR [de-identified] : no edema [de-identified] : no tenderness [de-identified] : warm/dry

## 2024-10-03 NOTE — ASSESSMENT
[FreeTextEntry1] : 43 y/o F with hereditary spherocytosis, s/p splenectomy in 2020 and here for follow up.   -Monitoring CBC, haptoglobin, LDH for surveillance for possible hemolytic episodes. Stable on today's check.  -Has had persistent elevation of WBC and PLT likely related to splenectomy. No signs or symptoms of infection.  -Will follow up CMP and monitor chronic hyperbilirubinemia, no scleral icterus or pain symptoms currently. -Discussed patient's diagnosis and her possibility of hemolytic episodes and the need for her to follow up regularly. She understands this.  -Continue follow up with PCP for HCM.  -Patient understands and agrees with plan. All information explained to the best of my ability.  Follow up in 6 months

## 2024-10-03 NOTE — BEGINNING OF VISIT
[Former] : Former [0-4] : 0-4 [> 15 Years] : > 15 Years [Date Discussed (MM/DD/YY): ___] : Discussed: [unfilled] [Patient/Caregiver unclear of wishes] : Patient/Caregiver unclear of wishes

## 2024-10-03 NOTE — HISTORY OF PRESENT ILLNESS
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 Solorio in 2015. The patient reported that around the age of 10 or 11, she was noted to have yellow eyes and dark urine. She was seen by a pediatrician but does not remember details of that workup. She does remember being told she was anemic, but later on in adulthood she was referred to a hematologist, Dr. Mejia, who performed a work for hemolysis. Pyruvate kinase was normal, G6PD levels were normal, but osmotic fragility test showed increased RBC lysis. The patient was told she had hereditary spherocytosis and a recommendation was made for splenectomy but she hadn't followed up again 2/2 insurance issues. Patient was lost to follow up at that point (she had apparently been seeing hematologist at [place] in that time), but she was experiencing worsening fatigue and scleral icterus, with a baseline bilirubin up to around 20 and baseline hemoglobin around 9.0. She is s/p cholecystectomy in November 2019 due to chronic cholelithiasis, and now is s/p splenectomy with Dr. Colmenares in February 2020. Procedures without complications, and hemoglobin much improved since then.  [de-identified] : Patient seen for follow up. Patient feeling well outside of allergies, she was recently checked, and she has an allergy to mold. No recent hospitalizations or infections at this time. Discussed her desire to lose weight as she is very active and has found she has continued to gain weight. Denied any abdominal pain, CP, palpitations, SOB, n/v/d/c, new bruising or bleeding.

## 2024-10-04 ENCOUNTER — APPOINTMENT (OUTPATIENT)
Dept: MAMMOGRAPHY | Facility: IMAGING CENTER | Age: 44
End: 2024-10-04
Payer: COMMERCIAL

## 2024-10-04 ENCOUNTER — RESULT REVIEW (OUTPATIENT)
Age: 44
End: 2024-10-04

## 2024-10-04 ENCOUNTER — APPOINTMENT (OUTPATIENT)
Dept: ULTRASOUND IMAGING | Facility: IMAGING CENTER | Age: 44
End: 2024-10-04
Payer: COMMERCIAL

## 2024-10-04 ENCOUNTER — OUTPATIENT (OUTPATIENT)
Dept: OUTPATIENT SERVICES | Facility: HOSPITAL | Age: 44
LOS: 1 days | End: 2024-10-04
Payer: COMMERCIAL

## 2024-10-04 DIAGNOSIS — Z98.890 OTHER SPECIFIED POSTPROCEDURAL STATES: Chronic | ICD-10-CM

## 2024-10-04 DIAGNOSIS — Z01.419 ENCOUNTER FOR GYNECOLOGICAL EXAMINATION (GENERAL) (ROUTINE) WITHOUT ABNORMAL FINDINGS: ICD-10-CM

## 2024-10-04 DIAGNOSIS — Z90.49 ACQUIRED ABSENCE OF OTHER SPECIFIED PARTS OF DIGESTIVE TRACT: Chronic | ICD-10-CM

## 2024-10-04 DIAGNOSIS — Z87.59 PERSONAL HISTORY OF OTHER COMPLICATIONS OF PREGNANCY, CHILDBIRTH AND THE PUERPERIUM: Chronic | ICD-10-CM

## 2024-10-04 PROCEDURE — 77063 BREAST TOMOSYNTHESIS BI: CPT | Mod: 26

## 2024-10-04 PROCEDURE — 77067 SCR MAMMO BI INCL CAD: CPT | Mod: 26

## 2024-10-04 PROCEDURE — 77063 BREAST TOMOSYNTHESIS BI: CPT

## 2024-10-04 PROCEDURE — 76641 ULTRASOUND BREAST COMPLETE: CPT

## 2024-10-04 PROCEDURE — 77067 SCR MAMMO BI INCL CAD: CPT

## 2024-10-04 PROCEDURE — 76641 ULTRASOUND BREAST COMPLETE: CPT | Mod: 26,50

## 2024-10-08 LAB
ALBUMIN SERPL ELPH-MCNC: 4.3 G/DL
ALP BLD-CCNC: 67 U/L
ALT SERPL-CCNC: 12 U/L
ANION GAP SERPL CALC-SCNC: 16 MMOL/L
AST SERPL-CCNC: 14 U/L
BILIRUB SERPL-MCNC: 2.4 MG/DL
BUN SERPL-MCNC: 10 MG/DL
CALCIUM SERPL-MCNC: 9.2 MG/DL
CHLORIDE SERPL-SCNC: 103 MMOL/L
CO2 SERPL-SCNC: 22 MMOL/L
CREAT SERPL-MCNC: 0.7 MG/DL
EGFR: 111 ML/MIN/1.73M2
GLUCOSE SERPL-MCNC: 86 MG/DL
HAPTOGLOB SERPL-MCNC: 100 MG/DL
LDH SERPL-CCNC: 197 U/L
POTASSIUM SERPL-SCNC: 3.8 MMOL/L
PROT SERPL-MCNC: 7.6 G/DL
SODIUM SERPL-SCNC: 141 MMOL/L

## 2024-10-28 NOTE — ED PROVIDER NOTE - NS HIV RISK FACTOR YES
Assessment/Plan     Jessica Smith is a 61 y.o. female with PMH of HFpEF,  OPAL/OHS, Asthma, and multiple abdominal surgeries (adhesions-lysis, cholecystectomy, javier-en-Y) and recurrent pain 2/2 adhesions, who presented to the ED on 10/25 for diarrhea with liquid stool. Reports about 4x a day for about a week (non-bloody) associated with weakness x3-4 days and dizziness. Found to have severe metabolic acidosis 2/2 GI losses and admitted to MICU on a bicarbonate infusion for further management for metabolic acidosis 2/2 GI losses. . Bicarb normalized. Pt was transitioned to oral bicarbonate. CTCAP concerning for infection vs malignancy vs inflammation, GI consulted. Stool work up reviewed. UA also concerning for UTI.  Started empirically on zosyn, de-escalated later to Nitrofurantoin. C. dif neg. GI decided on push endoscopy vs Colonoscopy, had to be delayed till 10/29.     Decreased sodium bicarb, monitor RFP if able to stop at some point.   SPENSER is resolving   c/w 2L NC at night for comfort, needs pulm f/up and repeat PFTs and sleep study            #acute diarrhea, Colitis  #UTI  - ct concerning for colitis and cystitis  - GI on board     #Javier-en-Y  #recurrent abdominal 2/2 adhesions   #SBO and diagnostic laparoscopy 8/2021 w adhesion lysis  #cholecystectomy  #GERD  - Currently was scheduled to have EGD and colonoscopy on 11/7/24. Last EGD 2018 with small Javier-en-Y gastrojejunostomy with gastrojejunal anastomosis characterized by healthy appearing mucosa. Colonoscopy 2016 normal.   - CT CAP with likely colitis and cystitis  - GI on board       #SPENSER - resolving   #Hyperkalemia  - resolved    - strict I/o, avoid nephrotoxins, renally dose as indicated  - Held ACEI      # Non-Anion Gap Metabolic acidosis - resolved   - 2/2 GI losses  - s/p bicarb infusion  - monitor I/o, wean off PO bicarb as able       #OPAL/OHS  #Asthma  -PFTs 9/2006: FEV1/FVC 56 / FEV1 1.5 w 10% change post-BD/ no hyperinflation in lung volumes, no  "DLCO. Sleep study 2006, PFTs shows obstructive pattern  -Uses 2L NC at night when sleeping for comfort  -c/w home albuterol  - c/w 2L NC at night for comfort  -needs pulm f/up and repeat PFTs and sleep study     # arthritis  #skin lesions on shoulders  - reports skin lesions 2/2 cat scratches  - knee pain d/t arthritis per patient    # Anxiety/MDD  - Home regimen: Lexapro 20mg daily, Gabapentin 300 mg at bedtime // Gabapentin 100mg PO Q12H PRN- 1st line for anxiety // Atarax 25mg Q8H PRN- 2nd line for anxiety  - denies any SI/SH      Scheduled outpatient appointments in system:   Future Appointments   Date Time Provider Department Center   11/7/2024  8:30 AM Nathalia Calloway MD [facility]     ---------------------------------------------------------------------------------------------------  Subjective   No events.         Time 50 min      ---------------------------------------------------------------------------------------------------  Objective   Last Recorded Vitals  Blood pressure 148/85, pulse 75, temperature 36.6 °C (97.9 °F), temperature source Temporal, resp. rate 15, height 1.6 m (5' 3\"), weight 145 kg (319 lb 3.6 oz), SpO2 99%.  Intake/Output last 3 Shifts:  I/O last 3 completed shifts:  In: 530 (3.7 mL/kg) [P.O.:480; IV Piggyback:50]  Out: 3050 (21.1 mL/kg) [Urine:3050 (0.6 mL/kg/hr)]  Dosing Weight: 144.8 kg     Physical Exam  Vitals and nursing note reviewed.   Constitutional:       General: She is not in acute distress.     Appearance: Normal appearance. She is not toxic-appearing.   HENT:      Head: Normocephalic and atraumatic.      Mouth/Throat:      Mouth: Mucous membranes are moist.   Eyes:      General: No scleral icterus.     Extraocular Movements: Extraocular movements intact.      Conjunctiva/sclera: Conjunctivae normal.   Cardiovascular:      Rate and Rhythm: Normal rate and regular rhythm.      Heart sounds: S1 normal and S2 normal. No murmur heard.  Pulmonary:      Effort: " Pulmonary effort is normal. No respiratory distress.      Breath sounds: No wheezing, rhonchi or rales.   Abdominal:      General: Bowel sounds are normal. There is no distension.      Palpations: Abdomen is soft.      Tenderness: There is no guarding or rebound.      Comments: Mild epigastric and lateral abdominal ttp, mostly LLQ   Musculoskeletal:         General: No swelling or deformity.      Cervical back: Neck supple.   Skin:     General: Skin is warm and dry.      Findings: No rash.   Neurological:      General: No focal deficit present.      Mental Status: She is alert. Mental status is at baseline.   Psychiatric:         Mood and Affect: Mood normal.         Relevant Results  Lab Results   Component Value Date    WBC 7.2 10/28/2024    HGB 9.0 (L) 10/28/2024    HCT 29.4 (L) 10/28/2024    MCV 90 10/28/2024     10/28/2024      Lab Results   Component Value Date    GLUCOSE 89 10/28/2024    CALCIUM 7.8 (L) 10/28/2024     10/28/2024    K 3.8 10/28/2024    CO2 25 10/28/2024     10/28/2024    BUN 24 (H) 10/28/2024    CREATININE 1.38 (H) 10/28/2024     Scheduled medications  cyanocobalamin, 1,000 mcg, oral, Daily  diclofenac sodium, 4 g, Topical, 4x daily  escitalopram, 20 mg, oral, Daily  [Held by provider] ferrous sulfate (325 mg ferrous sulfate), 1 tablet, oral, Every other day  fluticasone, 1 spray, Each Nostril, Daily  fluticasone furoate-vilanteroL, 1 puff, inhalation, Daily  gabapentin, 300 mg, oral, Nightly  heparin (porcine), 7,500 Units, subcutaneous, q8h SUNSHINE  lidocaine, 1 patch, transdermal, Daily  [Held by provider] lisinopril, 30 mg, oral, Daily  melatonin, 10 mg, oral, Nightly  pantoprazole, 40 mg, oral, Daily  piperacillin-tazobactam, 3.375 g, intravenous, q6h  sodium bicarbonate, 1,300 mg, oral, BID  sodium zirconium cyclosilicate, 10 g, oral, q8h      Continuous medications  oxygen,       PRN medications  PRN medications: acetaminophen, albuterol, dextromethorphan-guaifenesin,  gabapentin, hydrOXYzine HCL, [Held by provider] polyethylene glycol, [Held by provider] maxines    Vani Gaines MD       Declined

## 2025-02-09 NOTE — DISCHARGE NOTE PROVIDER - PROVIDER TOKENS
PROVIDER:[TOKEN:[[id number]],FOLLOWUP:[2 weeks]] Pt states he is on lantus 50 units QAM, glimepiride, metformin  - hold oral meds  - A1c 5.7   - FS elevated   - increase lantus to 24 U sq qhs and mealtime 4u ademalog w/ moderate dose SS

## 2025-04-01 ENCOUNTER — NON-APPOINTMENT (OUTPATIENT)
Age: 45
End: 2025-04-01

## 2025-04-02 ENCOUNTER — OUTPATIENT (OUTPATIENT)
Dept: OUTPATIENT SERVICES | Facility: HOSPITAL | Age: 45
LOS: 1 days | Discharge: HOME | End: 2025-04-02

## 2025-04-02 DIAGNOSIS — Z87.59 PERSONAL HISTORY OF OTHER COMPLICATIONS OF PREGNANCY, CHILDBIRTH AND THE PUERPERIUM: Chronic | ICD-10-CM

## 2025-04-02 DIAGNOSIS — D58.0 HEREDITARY SPHEROCYTOSIS: ICD-10-CM

## 2025-04-02 DIAGNOSIS — Z98.890 OTHER SPECIFIED POSTPROCEDURAL STATES: Chronic | ICD-10-CM

## 2025-04-02 DIAGNOSIS — Z90.49 ACQUIRED ABSENCE OF OTHER SPECIFIED PARTS OF DIGESTIVE TRACT: Chronic | ICD-10-CM

## 2025-04-03 ENCOUNTER — APPOINTMENT (OUTPATIENT)
Dept: HEMATOLOGY ONCOLOGY | Facility: CLINIC | Age: 45
End: 2025-04-03
Payer: COMMERCIAL

## 2025-04-03 ENCOUNTER — RESULT REVIEW (OUTPATIENT)
Age: 45
End: 2025-04-03

## 2025-04-03 VITALS
HEIGHT: 64 IN | OXYGEN SATURATION: 98 % | DIASTOLIC BLOOD PRESSURE: 82 MMHG | RESPIRATION RATE: 16 BRPM | WEIGHT: 186 LBS | BODY MASS INDEX: 31.76 KG/M2 | SYSTOLIC BLOOD PRESSURE: 128 MMHG | HEART RATE: 85 BPM | TEMPERATURE: 97.2 F

## 2025-04-03 DIAGNOSIS — D58.0 HEREDITARY SPHEROCYTOSIS: ICD-10-CM

## 2025-04-03 LAB
BASOPHILS # BLD AUTO: 0.05 K/UL — SIGNIFICANT CHANGE UP (ref 0–0.2)
BASOPHILS NFR BLD AUTO: 0.5 % — SIGNIFICANT CHANGE UP (ref 0–2)
ELLIPTOCYTES BLD QL SMEAR: SLIGHT — SIGNIFICANT CHANGE UP
EOSINOPHIL # BLD AUTO: 0.44 K/UL — SIGNIFICANT CHANGE UP (ref 0–0.5)
EOSINOPHIL NFR BLD AUTO: 4.5 % — SIGNIFICANT CHANGE UP (ref 0–6)
HCT VFR BLD CALC: 41 % — SIGNIFICANT CHANGE UP (ref 34.5–45)
HGB BLD-MCNC: 13.9 G/DL — SIGNIFICANT CHANGE UP (ref 11.5–15.5)
IMM GRANULOCYTES NFR BLD AUTO: 0.3 % — SIGNIFICANT CHANGE UP (ref 0–0.9)
LG PLATELETS BLD QL AUTO: SLIGHT — SIGNIFICANT CHANGE UP
LYMPHOCYTES # BLD AUTO: 1.59 K/UL — SIGNIFICANT CHANGE UP (ref 1–3.3)
LYMPHOCYTES # BLD AUTO: 16.3 % — SIGNIFICANT CHANGE UP (ref 13–44)
MCHC RBC-ENTMCNC: 29.4 PG — SIGNIFICANT CHANGE UP (ref 27–34)
MCHC RBC-ENTMCNC: 33.9 G/DL — SIGNIFICANT CHANGE UP (ref 32–36)
MCV RBC AUTO: 86.9 FL — SIGNIFICANT CHANGE UP (ref 80–100)
MONOCYTES # BLD AUTO: 1.24 K/UL — HIGH (ref 0–0.9)
MONOCYTES NFR BLD AUTO: 12.7 % — SIGNIFICANT CHANGE UP (ref 2–14)
NEUTROPHILS # BLD AUTO: 6.43 K/UL — SIGNIFICANT CHANGE UP (ref 1.8–7.4)
NEUTROPHILS NFR BLD AUTO: 65.7 % — SIGNIFICANT CHANGE UP (ref 43–77)
NRBC BLD AUTO-RTO: 0 /100 WBCS — SIGNIFICANT CHANGE UP (ref 0–0)
PLAT MORPH BLD: ABNORMAL
PLATELET # BLD AUTO: 477 K/UL — HIGH (ref 150–400)
POIKILOCYTOSIS BLD QL AUTO: SLIGHT — SIGNIFICANT CHANGE UP
RBC # BLD: 4.72 M/UL — SIGNIFICANT CHANGE UP (ref 3.8–5.2)
RBC # FLD: 13.6 % — SIGNIFICANT CHANGE UP (ref 10.3–14.5)
RBC BLD AUTO: ABNORMAL
RETICS #: 102.5 K/UL — SIGNIFICANT CHANGE UP (ref 25–125)
RETICS/RBC NFR: 2.2 % — SIGNIFICANT CHANGE UP (ref 0.5–2.5)
SCHISTOCYTES BLD QL AUTO: SLIGHT — SIGNIFICANT CHANGE UP
WBC # BLD: 9.78 K/UL — SIGNIFICANT CHANGE UP (ref 3.8–10.5)
WBC # FLD AUTO: 9.78 K/UL — SIGNIFICANT CHANGE UP (ref 3.8–10.5)

## 2025-04-03 PROCEDURE — G2211 COMPLEX E/M VISIT ADD ON: CPT

## 2025-04-03 PROCEDURE — 99213 OFFICE O/P EST LOW 20 MIN: CPT

## 2025-04-11 ENCOUNTER — NON-APPOINTMENT (OUTPATIENT)
Age: 45
End: 2025-04-11

## 2025-04-11 LAB
ALBUMIN SERPL ELPH-MCNC: 4.4 G/DL
ALP BLD-CCNC: 71 U/L
ALT SERPL-CCNC: 13 U/L
ANION GAP SERPL CALC-SCNC: 11 MMOL/L
AST SERPL-CCNC: 18 U/L
BILIRUB SERPL-MCNC: 1.6 MG/DL
BUN SERPL-MCNC: 11 MG/DL
CALCIUM SERPL-MCNC: 10 MG/DL
CHLORIDE SERPL-SCNC: 103 MMOL/L
CO2 SERPL-SCNC: 28 MMOL/L
CREAT SERPL-MCNC: 0.66 MG/DL
EGFRCR SERPLBLD CKD-EPI 2021: 112 ML/MIN/1.73M2
GLUCOSE SERPL-MCNC: 79 MG/DL
LDH SERPL-CCNC: 208 U/L
POTASSIUM SERPL-SCNC: 4.5 MMOL/L
PROT SERPL-MCNC: 7.5 G/DL
SODIUM SERPL-SCNC: 141 MMOL/L
URATE SERPL-MCNC: 3.6 MG/DL